# Patient Record
Sex: MALE | Race: WHITE | NOT HISPANIC OR LATINO | Employment: OTHER | URBAN - METROPOLITAN AREA
[De-identification: names, ages, dates, MRNs, and addresses within clinical notes are randomized per-mention and may not be internally consistent; named-entity substitution may affect disease eponyms.]

---

## 2022-05-12 DIAGNOSIS — R06.09 OTHER FORM OF DYSPNEA: Primary | ICD-10-CM

## 2022-05-19 ENCOUNTER — OFFICE VISIT (OUTPATIENT)
Dept: CARDIAC SURGERY | Facility: CLINIC | Age: 82
End: 2022-05-19
Payer: MEDICARE

## 2022-05-19 VITALS
DIASTOLIC BLOOD PRESSURE: 77 MMHG | HEART RATE: 91 BPM | WEIGHT: 212 LBS | BODY MASS INDEX: 28.71 KG/M2 | HEIGHT: 72 IN | SYSTOLIC BLOOD PRESSURE: 121 MMHG

## 2022-05-19 DIAGNOSIS — I35.0 AORTIC VALVE STENOSIS, ETIOLOGY OF CARDIAC VALVE DISEASE UNSPECIFIED: Primary | ICD-10-CM

## 2022-05-19 PROCEDURE — 3288F FALL RISK ASSESSMENT DOCD: CPT | Mod: CPTII,,, | Performed by: THORACIC SURGERY (CARDIOTHORACIC VASCULAR SURGERY)

## 2022-05-19 PROCEDURE — 1101F PT FALLS ASSESS-DOCD LE1/YR: CPT | Mod: CPTII,,, | Performed by: THORACIC SURGERY (CARDIOTHORACIC VASCULAR SURGERY)

## 2022-05-19 PROCEDURE — 1159F PR MEDICATION LIST DOCUMENTED IN MEDICAL RECORD: ICD-10-PCS | Mod: CPTII,,, | Performed by: THORACIC SURGERY (CARDIOTHORACIC VASCULAR SURGERY)

## 2022-05-19 PROCEDURE — 99203 OFFICE O/P NEW LOW 30 MIN: CPT | Mod: ,,, | Performed by: THORACIC SURGERY (CARDIOTHORACIC VASCULAR SURGERY)

## 2022-05-19 PROCEDURE — 3288F PR FALLS RISK ASSESSMENT DOCUMENTED: ICD-10-PCS | Mod: CPTII,,, | Performed by: THORACIC SURGERY (CARDIOTHORACIC VASCULAR SURGERY)

## 2022-05-19 PROCEDURE — 1159F MED LIST DOCD IN RCRD: CPT | Mod: CPTII,,, | Performed by: THORACIC SURGERY (CARDIOTHORACIC VASCULAR SURGERY)

## 2022-05-19 PROCEDURE — 3074F PR MOST RECENT SYSTOLIC BLOOD PRESSURE < 130 MM HG: ICD-10-PCS | Mod: CPTII,,, | Performed by: THORACIC SURGERY (CARDIOTHORACIC VASCULAR SURGERY)

## 2022-05-19 PROCEDURE — 3078F PR MOST RECENT DIASTOLIC BLOOD PRESSURE < 80 MM HG: ICD-10-PCS | Mod: CPTII,,, | Performed by: THORACIC SURGERY (CARDIOTHORACIC VASCULAR SURGERY)

## 2022-05-19 PROCEDURE — 1101F PR PT FALLS ASSESS DOC 0-1 FALLS W/OUT INJ PAST YR: ICD-10-PCS | Mod: CPTII,,, | Performed by: THORACIC SURGERY (CARDIOTHORACIC VASCULAR SURGERY)

## 2022-05-19 PROCEDURE — 99203 PR OFFICE/OUTPT VISIT, NEW, LEVL III, 30-44 MIN: ICD-10-PCS | Mod: ,,, | Performed by: THORACIC SURGERY (CARDIOTHORACIC VASCULAR SURGERY)

## 2022-05-19 PROCEDURE — 3078F DIAST BP <80 MM HG: CPT | Mod: CPTII,,, | Performed by: THORACIC SURGERY (CARDIOTHORACIC VASCULAR SURGERY)

## 2022-05-19 PROCEDURE — 3074F SYST BP LT 130 MM HG: CPT | Mod: CPTII,,, | Performed by: THORACIC SURGERY (CARDIOTHORACIC VASCULAR SURGERY)

## 2022-05-19 RX ORDER — ALLOPURINOL 100 MG/1
1 TABLET ORAL DAILY
COMMUNITY
Start: 2022-05-10

## 2022-05-19 RX ORDER — AMLODIPINE BESYLATE 10 MG/1
1 TABLET ORAL DAILY
COMMUNITY
Start: 2022-05-10 | End: 2022-06-07

## 2022-05-19 RX ORDER — CITALOPRAM 20 MG/1
1 TABLET, FILM COATED ORAL DAILY
COMMUNITY
Start: 2022-05-10

## 2022-05-19 RX ORDER — WARFARIN 2.5 MG/1
1 TABLET ORAL DAILY PRN
Status: ON HOLD | COMMUNITY
Start: 2022-05-10 | End: 2022-05-25 | Stop reason: HOSPADM

## 2022-05-19 RX ORDER — TAMSULOSIN HYDROCHLORIDE 0.4 MG/1
1 CAPSULE ORAL DAILY
COMMUNITY
Start: 2022-04-25 | End: 2022-06-06

## 2022-05-19 RX ORDER — BENAZEPRIL HYDROCHLORIDE 20 MG/1
1 TABLET ORAL DAILY
COMMUNITY
Start: 2022-05-10 | End: 2022-06-07

## 2022-05-19 RX ORDER — ATORVASTATIN CALCIUM 20 MG/1
20 TABLET, FILM COATED ORAL NIGHTLY
COMMUNITY
Start: 2022-04-07

## 2022-05-19 NOTE — PROGRESS NOTES
History and Physical      Subjective:      Patient ID: Geo Barrow is a 81 y.o. male.    Chief Complaint: Pre-op Exam (TAVR )      HPI:  This is an 81-year-old male former smoker who states that for the past several months he has had exertion related lightheadedness.  He will occasionally get short of breath but denies chest pain, orthopnea, or nocturnal dyspnea.  He has been diagnosed with critical aortic stenosis.  His STS risk assessment is 1.27%.  Ejection fraction is 55%.  The aortic valve area is 0.8 with a mean gradient of 42 and peak velocity of 4.6 m/sec.  He would be a very good candidate for transcatheter solution.  The procedure, risk, and complications have been discussed in detail and plain language.  Included in the discussion was the risk of bleeding, infection, myocardial infarction, stroke, and the potential for pacemaker implant and open conversion.  The patient voiced understanding and is willing to proceed.  We plan surgery on Tuesday May 24      Current Outpatient Medications:     allopurinoL (ZYLOPRIM) 100 MG tablet, Take 1 tablet by mouth once daily at 6am., Disp: , Rfl:     amLODIPine (NORVASC) 10 MG tablet, Take 1 tablet by mouth once daily at 6am., Disp: , Rfl:     atorvastatin (LIPITOR) 20 MG tablet, Take 1 tablet by mouth once daily at 6am., Disp: , Rfl:     benazepriL (LOTENSIN) 20 MG tablet, Take 1 tablet by mouth once daily at 6am., Disp: , Rfl:     citalopram (CELEXA) 10 MG tablet, Take 1 tablet by mouth once daily at 6am., Disp: , Rfl:     tamsulosin (FLOMAX) 0.4 mg Cap, Take 1 capsule by mouth once daily at 6am., Disp: , Rfl:     warfarin (COUMADIN) 2.5 MG tablet, Take 1 tablet by mouth daily as needed., Disp: , Rfl:   Review of patient's allergies indicates:  No Known Allergies    /77   Pulse 91   Ht 6' (1.829 m)   Wt 96.2 kg (212 lb)   BMI 28.75 kg/m²     Review of Systems   Constitutional: Negative.   HENT: Negative.    Eyes: Negative.     Cardiovascular:  Chronic atrial fibrillation  Respiratory: Negative.    Endocrine: Negative.    Hematologic/Lymphatic: Negative.    Skin: Negative.    Musculoskeletal: Negative.    Gastrointestinal: Negative.    Genitourinary: Negative.    Neurological: Negative.    Psychiatric/Behavioral: Negative.    Allergic/Immunologic: Negative.        Objective:     Constitutional:  No acute distress  HENT:  Supple without mass.  Trachea midline.  There is a transmitted aortic murmur  Eyes:  PERRLA  Cardiovascular:  Irregularly irregular.  There is a grade 2 systolic ejection murmur  Respiratory:  Clear to auscultation  Endocrine:  Hematologic/Lymphatic:   Skin:  Warm and dry  Musculoskeletal:  No gross abnormality  Gastrointestinal:  Soft and nontender  Genitourinary:   Neurological:  Normal  Psychiatric/Behavioral:   Extremities:  No cyanosis clubbing or edema    Assessment:  Critical aortic stenosis     Imaging:       Plan:  Transcatheter aortic valve replacement on Tuesday May 24

## 2022-05-23 ENCOUNTER — HOSPITAL ENCOUNTER (INPATIENT)
Facility: HOSPITAL | Age: 82
LOS: 2 days | Discharge: HOME | DRG: 811 | End: 2022-05-25
Attending: EMERGENCY MEDICINE | Admitting: INTERNAL MEDICINE
Payer: MEDICARE

## 2022-05-23 DIAGNOSIS — D64.9 SYMPTOMATIC ANEMIA: ICD-10-CM

## 2022-05-23 DIAGNOSIS — I35.0 AORTIC VALVE STENOSIS, ETIOLOGY OF CARDIAC VALVE DISEASE UNSPECIFIED: ICD-10-CM

## 2022-05-23 DIAGNOSIS — K92.2 ACUTE GI BLEEDING: ICD-10-CM

## 2022-05-23 DIAGNOSIS — R79.89 TROPONIN LEVEL ELEVATED: ICD-10-CM

## 2022-05-23 DIAGNOSIS — I35.0 NODULAR CALCIFIC AORTIC VALVE STENOSIS: ICD-10-CM

## 2022-05-23 DIAGNOSIS — R55 SYNCOPE AND COLLAPSE: ICD-10-CM

## 2022-05-23 DIAGNOSIS — R55 SYNCOPE, UNSPECIFIED SYNCOPE TYPE: Primary | ICD-10-CM

## 2022-05-23 DIAGNOSIS — R40.20 LOSS OF CONSCIOUSNESS: ICD-10-CM

## 2022-05-23 LAB
ABO + RH BLD: NORMAL
ABO + RH BLD: NORMAL
ALBUMIN SERPL-MCNC: 3.3 GM/DL (ref 3.4–4.8)
ALBUMIN/GLOB SERPL: 1.7 RATIO (ref 1.1–2)
ALP SERPL-CCNC: 62 UNIT/L (ref 40–150)
ALT SERPL-CCNC: 41 UNIT/L (ref 0–55)
AST SERPL-CCNC: 35 UNIT/L (ref 5–34)
AV INDEX (PROSTH): 0.14
AV MEAN GRADIENT: 62 MMHG
AV PEAK GRADIENT: 98 MMHG
AV VALVE AREA: 0.45 CM2
AV VELOCITY RATIO: 0.16
BASOPHILS # BLD AUTO: 0.03 X10(3)/MCL (ref 0–0.2)
BASOPHILS NFR BLD AUTO: 0.5 %
BILIRUBIN DIRECT+TOT PNL SERPL-MCNC: 0.5 MG/DL
BLD PROD TYP BPU: NORMAL
BLD PROD TYP BPU: NORMAL
BLOOD UNIT EXPIRATION DATE: NORMAL
BLOOD UNIT EXPIRATION DATE: NORMAL
BLOOD UNIT TYPE CODE: 600
BLOOD UNIT TYPE CODE: 600
BNP BLD-MCNC: 679 PG/ML
BSA FOR ECHO PROCEDURE: 2.21 M2
BUN SERPL-MCNC: 34.4 MG/DL (ref 8.4–25.7)
CALCIUM SERPL-MCNC: 8.7 MG/DL (ref 8.8–10)
CHLORIDE SERPL-SCNC: 106 MMOL/L (ref 98–107)
CHOLEST SERPL-MCNC: 99 MG/DL
CHOLEST/HDLC SERPL: 4 {RATIO} (ref 0–5)
CO2 SERPL-SCNC: 26 MMOL/L (ref 23–31)
CREAT SERPL-MCNC: 1.2 MG/DL (ref 0.73–1.18)
CROSSMATCH INTERPRETATION: NORMAL
CROSSMATCH INTERPRETATION: NORMAL
CV ECHO LV RWT: 0.86 CM
DISPENSE STATUS: NORMAL
DISPENSE STATUS: NORMAL
DOP CALC AO PEAK VEL: 4.94 M/S
DOP CALC AO VTI: 117 CM
DOP CALC LVOT AREA: 3.1 CM2
DOP CALC LVOT DIAMETER: 2 CM
DOP CALC LVOT PEAK VEL: 0.77 M/S
DOP CALC LVOT STROKE VOLUME: 52.44 CM3
DOP CALC MV VTI: 28.5 CM
DOP CALCLVOT PEAK VEL VTI: 16.7 CM
E/E' RATIO: 14.64 M/S
ECHO LV POSTERIOR WALL: 1.79 CM (ref 0.6–1.1)
EJECTION FRACTION: 50 %
EOSINOPHIL # BLD AUTO: 0.21 X10(3)/MCL (ref 0–0.9)
EOSINOPHIL NFR BLD AUTO: 3.4 %
ERYTHROCYTE [DISTWIDTH] IN BLOOD BY AUTOMATED COUNT: 13.6 % (ref 11.5–17)
FRACTIONAL SHORTENING: 25 % (ref 28–44)
GLOBULIN SER-MCNC: 1.9 GM/DL (ref 2.4–3.5)
GLUCOSE SERPL-MCNC: 139 MG/DL (ref 82–115)
GROUP & RH: NORMAL
HCT VFR BLD AUTO: 23.7 % (ref 42–52)
HCT VFR BLD AUTO: 26.8 % (ref 42–52)
HDLC SERPL-MCNC: 24 MG/DL (ref 35–60)
HGB BLD-MCNC: 7.7 GM/DL (ref 14–18)
HGB BLD-MCNC: 9 GM/DL (ref 14–18)
IMM GRANULOCYTES # BLD AUTO: 0.07 X10(3)/MCL (ref 0–0.02)
IMM GRANULOCYTES NFR BLD AUTO: 1.1 % (ref 0–0.43)
INDIRECT COOMBS GEL: NORMAL
INR BLD: 1.92 (ref 0–1.3)
INTERVENTRICULAR SEPTUM: 1.72 CM (ref 0.6–1.1)
LDLC SERPL CALC-MCNC: 58 MG/DL (ref 50–140)
LEFT ATRIUM SIZE: 4.7 CM
LEFT ATRIUM VOLUME INDEX MOD: 77.1 ML/M2
LEFT ATRIUM VOLUME MOD: 168 CM3
LEFT CCA DIST DIAS: 15 CM/S
LEFT CCA DIST SYS: 48 CM/S
LEFT CCA PROX DIAS: 17 CM/S
LEFT CCA PROX SYS: 87 CM/S
LEFT ECA SYS: 49 CM/S
LEFT ICA DIST DIAS: 17 CM/S
LEFT ICA DIST SYS: 51 CM/S
LEFT ICA MID DIAS: 24 CM/S
LEFT ICA MID SYS: 87 CM/S
LEFT ICA PROX DIAS: 25 CM/S
LEFT ICA PROX SYS: 89 CM/S
LEFT INTERNAL DIMENSION IN SYSTOLE: 3.13 CM (ref 2.1–4)
LEFT VENTRICLE DIASTOLIC VOLUME INDEX: 35.64 ML/M2
LEFT VENTRICLE DIASTOLIC VOLUME: 77.7 ML
LEFT VENTRICLE MASS INDEX: 146 G/M2
LEFT VENTRICLE SYSTOLIC VOLUME INDEX: 17.8 ML/M2
LEFT VENTRICLE SYSTOLIC VOLUME: 38.8 ML
LEFT VENTRICULAR INTERNAL DIMENSION IN DIASTOLE: 4.18 CM (ref 3.5–6)
LEFT VENTRICULAR MASS: 318.19 G
LEFT VERTEBRAL SYS: 41 CM/S
LV LATERAL E/E' RATIO: 14.64 M/S
LV SEPTAL E/E' RATIO: 14.64 M/S
LVOT MG: 1 MMHG
LVOT MV: 0.54 CM/S
LYMPHOCYTES # BLD AUTO: 0.49 X10(3)/MCL (ref 0.6–4.6)
LYMPHOCYTES NFR BLD AUTO: 7.9 %
MAGNESIUM SERPL-MCNC: 2 MG/DL (ref 1.6–2.6)
MCH RBC QN AUTO: 32 PG (ref 27–31)
MCHC RBC AUTO-ENTMCNC: 32.5 MG/DL (ref 33–36)
MCV RBC AUTO: 98.3 FL (ref 80–94)
MONOCYTES # BLD AUTO: 0.33 X10(3)/MCL (ref 0.1–1.3)
MONOCYTES NFR BLD AUTO: 5.3 %
MR PISA EROA: 0.13 CM2
MV MEAN GRADIENT: 3 MMHG
MV PEAK E VEL: 1.61 M/S
MV PEAK GRADIENT: 8 MMHG
MV STENOSIS PRESSURE HALF TIME: 51 MS
MV VALVE AREA BY CONTINUITY EQUATION: 1.84 CM2
MV VALVE AREA P 1/2 METHOD: 4.31 CM2
NEUTROPHILS # BLD AUTO: 5.1 X10(3)/MCL (ref 2.1–9.2)
NEUTROPHILS NFR BLD AUTO: 81.8 %
NRBC BLD AUTO-RTO: 0 %
OHS CV CAROTID RIGHT ICA EDV HIGHEST: 25
OHS CV CAROTID ULTRASOUND LEFT ICA/CCA RATIO: 1.85
OHS CV CAROTID ULTRASOUND RIGHT ICA/CCA RATIO: 1.46
OHS CV PV CAROTID LEFT HIGHEST CCA: 87
OHS CV PV CAROTID LEFT HIGHEST ICA: 89
OHS CV PV CAROTID RIGHT HIGHEST CCA: 83
OHS CV PV CAROTID RIGHT HIGHEST ICA: 73
OHS CV US CAROTID LEFT HIGHEST EDV: 25
PISA MRMAX VEL: 6.03 M/S
PISA RADIUS: 0.5 CM
PISA TR MAX VEL: 2.4 M/S
PISA VN NYQUIST MS: 0.5 M/S
PISA VN NYQUIST: 0.5 M/S
PLATELET # BLD AUTO: 148 X10(3)/MCL (ref 130–400)
PMV BLD AUTO: 11.1 FL (ref 9.4–12.4)
POTASSIUM SERPL-SCNC: 3.5 MMOL/L (ref 3.5–5.1)
PROT SERPL-MCNC: 5.2 GM/DL (ref 5.8–7.6)
PROTHROMBIN TIME: 21.6 SECONDS (ref 12.5–14.5)
PV PEAK VELOCITY: 1.18 CM/S
RA PRESSURE: 15 MMHG
RBC # BLD AUTO: 2.41 X10(6)/MCL (ref 4.7–6.1)
RIGHT CCA DIST DIAS: 13 CM/S
RIGHT CCA DIST SYS: 50 CM/S
RIGHT CCA PROX DIAS: 7 CM/S
RIGHT CCA PROX SYS: 83 CM/S
RIGHT ECA DIAS: 47 CM/S
RIGHT ECA SYS: 55 CM/S
RIGHT ICA DIST DIAS: 25 CM/S
RIGHT ICA DIST SYS: 53 CM/S
RIGHT ICA MID DIAS: 8 CM/S
RIGHT ICA MID SYS: 73 CM/S
RIGHT ICA PROX DIAS: 11 CM/S
RIGHT ICA PROX SYS: 55 CM/S
RIGHT VENTRICULAR END-DIASTOLIC DIMENSION: 2.99 CM
RIGHT VERTEBRAL SYS: 80 CM/S
SODIUM SERPL-SCNC: 140 MMOL/L (ref 136–145)
TDI LATERAL: 0.11 M/S
TDI SEPTAL: 0.11 M/S
TDI: 0.11 M/S
TR MAX PG: 23 MMHG
TRICUSPID ANNULAR PLANE SYSTOLIC EXCURSION: 2.14 CM
TRIGL SERPL-MCNC: 83 MG/DL (ref 34–140)
TROPONIN I SERPL-MCNC: 0.05 NG/ML (ref 0–0.04)
TROPONIN I SERPL-MCNC: 0.15 NG/ML (ref 0–0.04)
TV REST PULMONARY ARTERY PRESSURE: 38 MMHG
UNIT NUMBER: NORMAL
UNIT NUMBER: NORMAL
VLDLC SERPL CALC-MCNC: 17 MG/DL
WBC # SPEC AUTO: 6.2 X10(3)/MCL (ref 4.5–11.5)

## 2022-05-23 PROCEDURE — 96374 THER/PROPH/DIAG INJ IV PUSH: CPT

## 2022-05-23 PROCEDURE — 11000001 HC ACUTE MED/SURG PRIVATE ROOM

## 2022-05-23 PROCEDURE — 99285 EMERGENCY DEPT VISIT HI MDM: CPT | Mod: 25

## 2022-05-23 PROCEDURE — C9113 INJ PANTOPRAZOLE SODIUM, VIA: HCPCS | Performed by: EMERGENCY MEDICINE

## 2022-05-23 PROCEDURE — 36430 TRANSFUSION BLD/BLD COMPNT: CPT

## 2022-05-23 PROCEDURE — 83880 ASSAY OF NATRIURETIC PEPTIDE: CPT | Performed by: EMERGENCY MEDICINE

## 2022-05-23 PROCEDURE — 84484 ASSAY OF TROPONIN QUANT: CPT | Performed by: EMERGENCY MEDICINE

## 2022-05-23 PROCEDURE — 96361 HYDRATE IV INFUSION ADD-ON: CPT

## 2022-05-23 PROCEDURE — C9113 INJ PANTOPRAZOLE SODIUM, VIA: HCPCS | Performed by: PHYSICIAN ASSISTANT

## 2022-05-23 PROCEDURE — 85610 PROTHROMBIN TIME: CPT | Performed by: EMERGENCY MEDICINE

## 2022-05-23 PROCEDURE — 80053 COMPREHEN METABOLIC PANEL: CPT | Performed by: EMERGENCY MEDICINE

## 2022-05-23 PROCEDURE — 85025 COMPLETE CBC W/AUTO DIFF WBC: CPT | Performed by: EMERGENCY MEDICINE

## 2022-05-23 PROCEDURE — 25000003 PHARM REV CODE 250: Performed by: EMERGENCY MEDICINE

## 2022-05-23 PROCEDURE — 36415 COLL VENOUS BLD VENIPUNCTURE: CPT | Performed by: EMERGENCY MEDICINE

## 2022-05-23 PROCEDURE — 86850 RBC ANTIBODY SCREEN: CPT | Performed by: EMERGENCY MEDICINE

## 2022-05-23 PROCEDURE — 63600175 PHARM REV CODE 636 W HCPCS: Performed by: EMERGENCY MEDICINE

## 2022-05-23 PROCEDURE — 63600175 PHARM REV CODE 636 W HCPCS: Performed by: PHYSICIAN ASSISTANT

## 2022-05-23 PROCEDURE — P9016 RBC LEUKOCYTES REDUCED: HCPCS | Performed by: EMERGENCY MEDICINE

## 2022-05-23 PROCEDURE — 86920 COMPATIBILITY TEST SPIN: CPT | Performed by: EMERGENCY MEDICINE

## 2022-05-23 PROCEDURE — 80061 LIPID PANEL: CPT | Performed by: PHYSICIAN ASSISTANT

## 2022-05-23 PROCEDURE — 83735 ASSAY OF MAGNESIUM: CPT | Performed by: EMERGENCY MEDICINE

## 2022-05-23 PROCEDURE — 85014 HEMATOCRIT: CPT | Performed by: PHYSICIAN ASSISTANT

## 2022-05-23 RX ORDER — CITALOPRAM 10 MG/1
10 TABLET ORAL DAILY
Status: DISCONTINUED | OUTPATIENT
Start: 2022-05-24 | End: 2022-05-25 | Stop reason: HOSPADM

## 2022-05-23 RX ORDER — ALLOPURINOL 100 MG/1
100 TABLET ORAL DAILY
Status: DISCONTINUED | OUTPATIENT
Start: 2022-05-24 | End: 2022-05-25 | Stop reason: HOSPADM

## 2022-05-23 RX ORDER — TAMSULOSIN HYDROCHLORIDE 0.4 MG/1
1 CAPSULE ORAL DAILY
Status: DISCONTINUED | OUTPATIENT
Start: 2022-05-24 | End: 2022-05-25 | Stop reason: HOSPADM

## 2022-05-23 RX ORDER — PANTOPRAZOLE SODIUM 40 MG/10ML
40 INJECTION, POWDER, LYOPHILIZED, FOR SOLUTION INTRAVENOUS 2 TIMES DAILY
Status: DISCONTINUED | OUTPATIENT
Start: 2022-05-23 | End: 2022-05-25

## 2022-05-23 RX ORDER — SODIUM CHLORIDE 0.9 % (FLUSH) 0.9 %
10 SYRINGE (ML) INJECTION EVERY 12 HOURS PRN
Status: DISCONTINUED | OUTPATIENT
Start: 2022-05-23 | End: 2022-05-25 | Stop reason: HOSPADM

## 2022-05-23 RX ORDER — ACETAMINOPHEN 325 MG/1
650 TABLET ORAL EVERY 8 HOURS PRN
Status: DISCONTINUED | OUTPATIENT
Start: 2022-05-23 | End: 2022-05-25 | Stop reason: HOSPADM

## 2022-05-23 RX ORDER — FUROSEMIDE 20 MG/1
20 TABLET ORAL DAILY
COMMUNITY
End: 2022-06-07

## 2022-05-23 RX ORDER — PANTOPRAZOLE SODIUM 40 MG/10ML
80 INJECTION, POWDER, LYOPHILIZED, FOR SOLUTION INTRAVENOUS
Status: COMPLETED | OUTPATIENT
Start: 2022-05-23 | End: 2022-05-23

## 2022-05-23 RX ORDER — IBUPROFEN 200 MG
24 TABLET ORAL
Status: DISCONTINUED | OUTPATIENT
Start: 2022-05-23 | End: 2022-05-25 | Stop reason: HOSPADM

## 2022-05-23 RX ORDER — GLUCAGON 1 MG
1 KIT INJECTION
Status: DISCONTINUED | OUTPATIENT
Start: 2022-05-23 | End: 2022-05-25 | Stop reason: HOSPADM

## 2022-05-23 RX ORDER — ATORVASTATIN CALCIUM 10 MG/1
20 TABLET, FILM COATED ORAL DAILY
Status: DISCONTINUED | OUTPATIENT
Start: 2022-05-24 | End: 2022-05-25 | Stop reason: HOSPADM

## 2022-05-23 RX ORDER — HYDROCODONE BITARTRATE AND ACETAMINOPHEN 500; 5 MG/1; MG/1
TABLET ORAL
Status: DISCONTINUED | OUTPATIENT
Start: 2022-05-23 | End: 2022-05-25 | Stop reason: HOSPADM

## 2022-05-23 RX ORDER — IBUPROFEN 200 MG
16 TABLET ORAL
Status: DISCONTINUED | OUTPATIENT
Start: 2022-05-23 | End: 2022-05-25 | Stop reason: HOSPADM

## 2022-05-23 RX ORDER — ACETAMINOPHEN 325 MG/1
650 TABLET ORAL EVERY 4 HOURS PRN
Status: DISCONTINUED | OUTPATIENT
Start: 2022-05-23 | End: 2022-05-25 | Stop reason: HOSPADM

## 2022-05-23 RX ORDER — ONDANSETRON 2 MG/ML
4 INJECTION INTRAMUSCULAR; INTRAVENOUS EVERY 4 HOURS PRN
Status: DISCONTINUED | OUTPATIENT
Start: 2022-05-23 | End: 2022-05-25 | Stop reason: HOSPADM

## 2022-05-23 RX ADMIN — PANTOPRAZOLE SODIUM 80 MG: 40 INJECTION, POWDER, FOR SOLUTION INTRAVENOUS at 10:05

## 2022-05-23 RX ADMIN — SODIUM CHLORIDE 500 ML: 9 INJECTION, SOLUTION INTRAVENOUS at 10:05

## 2022-05-23 RX ADMIN — PANTOPRAZOLE SODIUM 40 MG: 40 INJECTION, POWDER, FOR SOLUTION INTRAVENOUS at 09:05

## 2022-05-23 NOTE — H&P
[facility] Hospital Medicine History & Physical Examination       Patient Name: Geo Barrow  MRN: [id number]  Patient Class: IP- Inpatient   Admission Date: 5/23/2022   Admitting Physician: Shania Benoit MD   Length of Stay: 0  Attending Physician: Shania Benoit MD   Primary Care Provider: Deidra Briggs NP  Face-to-Face encounter date: 05/23/2022  Code Status: Full Code  Chief Complaint: Loss of Consciousness (Syncopal episode this morning while sitting in bath tub. Scheduled to have valve replacement tomorrow. Wife states pt had syncopal episode yesterday morning as well. Cardiologist - sunday. Denies cp/sob, n/v. )        Patient information was obtained from patient, patient's family, past medical records and ER records.     HISTORY OF PRESENT ILLNESS:   Geo Barrow is a 81 y.o. White male with a past medical history of hypertension, hyperlipidemia, atrial fibrillation on Coumadin, gout, prostate cancer status post radiation and aortic valve stenosis. The patient presented to [facility] on 5/23/2022 with a primary complaint of with complaints of 2 syncopal episodes yesterday (5/22/2022).  First episode patient was walking to his vehicle when he became short of breath and dizzy. He was sitting in his truck he passed out. Secondary incident occurred this morning while in the bath tube. Associated symptoms include weakness with ambulation and blurred vision. He denies complaints of chest pain, abdominal pain, headache. He reports having the stomach virus a week ago with nausea, vomiting and diarrhea but has since has resolved. He started to notice dark black stool on 5/18/2022. He is on Coumadin which he took his last dose on 5/17/2022 as he is scheduled for a TAVR tomorrow (5/24/2022) with Dr. Fragoso. He reports taking 1 Aleve over the last few days. His last colonoscopy was 5 years ago which was normal.    Upon presentation to the ED, afebrile, tachycardic at 110 and hypotensive at  90/48. Labs notable for hemoglobin and hematocrit 7.7/23.7, MCV 98, BUN/creatinine 34/1.20, glucose 139, troponin 0.146, , INR 1.92.  EKG with atrial fibrillation with a rate of 94, nonspecific ST and T-wave abnormalities and prolonged QT interval.  Chest x-ray without acute cardiopulmonary processes. Stool brown with some black and guaiac positive. While in the ED patient received 80 mg of Protonix. He was typed and crossmatched for transfusion of 2 units packed red blood cells. Cardiology and GI were consulted. Patient is admitted to hospital medicine services for further medical management.     PAST MEDICAL HISTORY:     Past Medical History:   Diagnosis Date    Aortic valve stenosis     Atrial fibrillation     Hyperlipidemia     Hypertension     Stenosis of aortic and mitral valves    BPH  Prostate cancer s/p radiation      PAST SURGICAL HISTORY:   Hernia repair  Colonoscopy    ALLERGIES:   Beta blockers    FAMILY HISTORY:   Reviewed and negative    SOCIAL HISTORY:   Tobacco - Former smoker. Quit 60+ years ago  Alcohol - 4-5 beers daily  Illicit Drugs - Denies      HOME MEDICATIONS:     Prior to Admission medications    Medication Sig Start Date End Date Taking? Authorizing Provider   allopurinoL (ZYLOPRIM) 100 MG tablet Take 1 tablet by mouth once daily at 6am. 5/10/22  Yes Historical Provider   amLODIPine (NORVASC) 10 MG tablet Take 1 tablet by mouth once daily at 6am. 5/10/22  Yes Historical Provider   atorvastatin (LIPITOR) 20 MG tablet Take 1 tablet by mouth once daily at 6am. 4/7/22  Yes Historical Provider   benazepriL (LOTENSIN) 20 MG tablet Take 1 tablet by mouth once daily at 6am. 5/10/22  Yes Historical Provider   citalopram (CELEXA) 10 MG tablet Take 1 tablet by mouth once daily at 6am. 5/10/22  Yes Historical Provider   furosemide (LASIX) 20 MG tablet Take 20 mg by mouth once daily. 3 times a week   Yes Historical Provider   warfarin (COUMADIN) 2.5 MG tablet Take 1 tablet by mouth daily  as needed. 5/10/22  Yes Historical Provider   tamsulosin (FLOMAX) 0.4 mg Cap Take 1 capsule by mouth once daily at 6am. 4/25/22   Historical Provider       REVIEW OF SYSTEMS:   Except as documented, all other systems reviewed and negative     PHYSICAL EXAM:     VITAL SIGNS: 24 HRS MIN & MAX LAST   Temp  Min: 97.2 °F (36.2 °C)  Max: 98.4 °F (36.9 °C) 98.4 °F (36.9 °C)   BP  Min: 90/48  Max: 112/70 112/70   Pulse  Min: 82  Max: 110  95   Resp  Min: 11  Max: 16 16   SpO2  Min: 96 %  Max: 100 % 99 %       General appearance: Well-developed, well-nourished male in no apparent distress. No family at bedside.  HEENT: Atraumatic head. Moist mucous membranes of oral cavity.  Lungs: Clear to auscultation bilaterally.   Heart: Irregular rate and rhythm. No edema  Abdomen: Soft, mild distention, non-tender. Bowel sounds are hypoactive.   Extremities: No cyanosis, clubbing. No deformities.  Skin: No Rash. Warm and dry.  Neuro: Awake, alert and oriented. Motor and sensory exams grossly intact.  Psych/mental status: Appropriate mood and affect. Cooperative. Responds appropriately to questions.       LABS AND IMAGING:     Recent Labs   Lab 05/23/22  0955   WBC 6.2   RBC 2.41*   HGB 7.7*   HCT 23.7*   MCV 98.3*   MCH 32.0*   MCHC 32.5*   RDW 13.6      MPV 11.1       Recent Labs   Lab 05/23/22  0955      K 3.5   CO2 26   BUN 34.4*   CREATININE 1.20*   CALCIUM 8.7*   MG 2.00   ALBUMIN 3.3*   ALKPHOS 62   ALT 41   AST 35*   BILITOT 0.5       Microbiology Results (last 7 days)     ** No results found for the last 168 hours. **           X-Ray Chest AP Portable  Narrative: EXAMINATION:  XR CHEST AP PORTABLE    CLINICAL HISTORY:  syncope;    TECHNIQUE:  Single view of the chest    COMPARISON:  No prior imaging available for comparison.    FINDINGS:  No focal opacification, pleural effusion, or pneumothorax.    The cardiomediastinal silhouette is within normal limits.    No acute osseous abnormality.  Impression: No acute  cardiopulmonary process.    Electronically signed by: Sandeep Palencia  Date:    05/23/2022  Time:    09:55        ASSESSMENT & PLAN:   Assessment:  Syncope  Acute macrocytic anemia secondary to acute GI Bleed  NSTEMI  Atrial fibrillation  Essential hypertension  Aortic valve stenosis    Plan:  - GI and cardiology consulted. Appreciate recommendations  - Clear liquid diet. NPO after midnight  - IV Protonix 40mg BID  - Type and crossmatch completed for transfusion of 2 units packed RBC  - Repeat H&H 1 hour post transfusion  - Trend troponin  - Telemetry monitoring  - Orthostatic vitals, echo and Carotid US ordered  - Labs in AM        VTE Prophylaxis: will be placed on SCD for DVT prophylaxis and will be advised to be as mobile as possible and sit in a chair as tolerated      __________________________________________________________________________  INPATIENT LIST OF MEDICATIONS     Current Facility-Administered Medications:     0.9%  NaCl infusion (for blood administration), , Intravenous, Q24H PRN, Baldomero Balbuena MD    acetaminophen tablet 650 mg, 650 mg, Oral, Q8H PRN, Bobbi Rooney PA-C    acetaminophen tablet 650 mg, 650 mg, Oral, Q4H PRN, Bobbi Rooney PA-C    [START ON 5/24/2022] allopurinoL tablet 100 mg, 100 mg, Oral, Daily, Shania Benoit MD    [START ON 5/24/2022] atorvastatin tablet 20 mg, 20 mg, Oral, Daily, Shania Benoit MD    [START ON 5/24/2022] citalopram tablet 10 mg, 10 mg, Oral, Daily, Shania Benoit MD    dextrose 10% bolus 125 mL, 12.5 g, Intravenous, PRN, Baldomero Balbuena MD    dextrose 10% bolus 250 mL, 25 g, Intravenous, PRN, Baldomero Balbuena MD    glucagon (human recombinant) injection 1 mg, 1 mg, Intramuscular, PRN, Bobbi Rooney PA-C    glucose chewable tablet 16 g, 16 g, Oral, PRN, Bobbi Rooney PA-C    glucose chewable tablet 24 g, 24 g, Oral, PRN, Bobbi Rooney PA-C    ondansetron injection 4 mg, 4 mg, Intravenous, Q4H PRN, Bobbi Rooney PA-C     pantoprazole injection 40 mg, 40 mg, Intravenous, BID, Bobbi Rooney PA-C    sodium chloride 0.9% flush 10 mL, 10 mL, Intravenous, Q12H PRN, Bobbi Rooney PA-C    [START ON 5/24/2022] tamsulosin 24 hr capsule 0.4 mg, 1 capsule, Oral, Daily, Shania Benoit MD    Current Outpatient Medications:     allopurinoL (ZYLOPRIM) 100 MG tablet, Take 1 tablet by mouth once daily at 6am., Disp: , Rfl:     amLODIPine (NORVASC) 10 MG tablet, Take 1 tablet by mouth once daily at 6am., Disp: , Rfl:     atorvastatin (LIPITOR) 20 MG tablet, Take 1 tablet by mouth once daily at 6am., Disp: , Rfl:     benazepriL (LOTENSIN) 20 MG tablet, Take 1 tablet by mouth once daily at 6am., Disp: , Rfl:     citalopram (CELEXA) 10 MG tablet, Take 1 tablet by mouth once daily at 6am., Disp: , Rfl:     furosemide (LASIX) 20 MG tablet, Take 20 mg by mouth once daily. 3 times a week, Disp: , Rfl:     warfarin (COUMADIN) 2.5 MG tablet, Take 1 tablet by mouth daily as needed., Disp: , Rfl:     tamsulosin (FLOMAX) 0.4 mg Cap, Take 1 capsule by mouth once daily at 6am., Disp: , Rfl:       Scheduled Meds:   [START ON 5/24/2022] allopurinoL  100 mg Oral Daily    [START ON 5/24/2022] atorvastatin  20 mg Oral Daily    [START ON 5/24/2022] citalopram  10 mg Oral Daily    pantoprazole  40 mg Intravenous BID    [START ON 5/24/2022] tamsulosin  1 capsule Oral Daily     Continuous Infusions:  PRN Meds:.sodium chloride, acetaminophen, acetaminophen, dextrose 10%, dextrose 10%, glucagon (human recombinant), glucose, glucose, ondansetron, sodium chloride 0.9%      Discharge Planning and Disposition: Anticipated discharge to be determined.    Bobbi DUGAN PA, have reviewed and discussed the case with Dr. Shania Benoit MD    Please see the following addendum for further assessment and plan from there attending MD.    Bobbi E Rooney, PA-C  05/23/2022    ________________________________________________________________________________    MD  Addendum:  I, Dr. Shania Benoit MD assumed care of this patient today at around 2 pm  For the patient encounter, I performed the substantive portion of the visit, I reviewed the NP/PA documentation, treatment plan, and medical decision making.  I had face to face time with this patient     A. History:  81-year-old  male with significant history of HTN, HLD, PAF on Coumadin, chronic gout, prostate cancer status post radiation, valvular heart disease-aortic stenosis awaiting TAVR.  Patient is scheduled for TAVR tomorrow morning.  Patient presented to the ED on 05/23 with complaints of 2 syncopal episodes yesterday.  Patient reports becoming short of breath and dizzy while he was walking to his vehicle yesterday and he passed out while he was inside his truck.  And 1 more episode today occurred while he was in backed up.  Patient reports he had viral gastroenteritis 1 and half weeks ago with primary complaints of nausea, vomiting, diarrhea.  Symptoms then had resolved, but since this he had couple of episodes of melanotic stools.  Patient's Coumadin has been on hold since 5/17 in preparation for TAVR.  Patient presented to the ED given syncopal episodes.  Upon initial evaluation in the ED patient was borderline hypotensive.  Lab significant for anemia with hemoglobin less than 7.7.  Patient was also noted to have mild renal insufficiency.  Hospitalist team and GI team consulted by the ED physician.      B. Physical exam: as above    C. Medical decision making:    Syncopal episode likely secondary to severe anemia  Acute versus acute on chronic macrocytic anemia  Recent melanotic stools  Suspected acute blood loss anemia secondary to  GI bleed  Severe aortic stenosis awaiting TAVR, scheduled for 5/24  History of essential HTN  Borderline hypotension on admit secondary to anemia  HLD  History of PAF on Coumadin, on hold since 05/17  Chronic gout  History of prostate cancer status post  radiation  Prophylaxis    Likely cause of syncopal episode was severe anemia  Patient also was hypotensive on admit, improved with blood transfusion  2 units PRBC transfusion ordered  Initiated IV Protonix  GI consulted, they are planning for endoscopic evaluation today afternoon  Will keep NPO  Cardiology also consulted given that he is scheduled for TAVR tomorrow  Await her recommendations  Continue to monitor hemodynamics closely  Coumadin already on hold since 05/17 in preparation for TAVR  Monitor for overt bleeding  Hold home antihypertensives given hypotension on admit  Resumed other home meds-statin, citalopram, tamsulosin, allopurinol  I will also obtain ultrasound carotid to further evaluate syncopal episode even though the most likely cause was anemia  DVT prophylaxis-bilateral SCDs    Critical care time-45 minutes  Critical care diagnosis-acute on chronic macrocytic  anemia requiring PRBC transfusion  Critical care interventions: Hands-on evaluation, review of labs/radiographs/records and discussions with patient    All diagnosis and differential diagnosis have been reviewed; assessment and plan has been documented; I have personally reviewed the labs and test results that are presently available; I have reviewed the patients medication list; I have reviewed the consulting providers response and recommendations. I have reviewed or attempted to review medical records based upon their availability.    All of the patient and family questions have been addressed and answered. Patient's is agreeable to the above stated plan. I will continue to monitor closely and make adjustments to medical management as needed.      Shania Benoit MD  05/23/2022

## 2022-05-23 NOTE — ASSESSMENT & PLAN NOTE
EGD today  NPO  H/H low- receiving 2 units PRBC in ER- starting now  INR 1.92    TAVR planned for tomorrow    Reports or black stools intermittently last week in conjunction with Pepto-Bismol use during a viral diarrheal illness that has resolved

## 2022-05-23 NOTE — ED NOTES
Bed: 19  Expected date: 5/23/22  Expected time: 8:55 AM  Means of arrival:   Comments:  Ems - 81 m - syncope

## 2022-05-23 NOTE — CONSULTS
"[facility] - Emergency Dept  Gastroenterology  Consult Note    Patient Name: Geo Barrow  MRN: [id number]  Admission Date: 5/23/2022  Hospital Length of Stay: 0 days  Code Status: Full Code   Attending Provider: Shania Benoit MD   Consulting Provider: MARTA Simms  Primary Care Physician: Reese Fragoso MD  Principal Problem:<principal problem not specified>    Inpatient consult to Gastroenterology  Consult performed by: MARTA Simms  Consult ordered by: Baldomero Balbuena MD  Reason for consult: anemia        Subjective:     HPI:  81 year old WM presented to ED with syncopal episodes- one  yesterday and and one today with weakness today. Pertinent PMHx includes: Aortic valve stenosis, Mitral Valve stenosis, HTN, A fib, HLD. Plans for TAVR tomorrow with [place]. Anemia noted upon arrival at 7.7/23.7.    Some black stools noted last week.   Guaiac positive in ER    Patient reports having a diarrheal illness last week that has resolved. Last bowel movement 2 days ago- brown. Reports several other sick contacts in family with same symptoms. Patient reports black stools intermittently with the diarrhea. This was also concurrent with Pepto-bismol use. Denies BRBPR      ETOH: daily beer: 2-6 beers per day for many years  NSAID: denies  Anticoagulant/antiplatelet: Coumadin with last dose 5/18/22 in preparation for TAVR on 5/24/22      Previous Endoscopy includes previous colonoscopies- routine with Dr. Guzman in [place]. He is scheduled for colonoscopy "this summer" for routine colonoscopy. Denies history of colon polyps.     No previous EGD  Denies heartburn, nausea, vomiting, dysphagia, epigastric pains    Denies family history of GI cancers or concerns      Past Medical History:   Diagnosis Date    Aortic valve stenosis     Atrial fibrillation     Hyperlipidemia     Hypertension     Stenosis of aortic and mitral valves        No past surgical history on file.    Review of patient's " allergies indicates:  No Known Allergies  Family History       Problem Relation (Age of Onset)    Hypertension Mother    No Known Problems Father, Sister, Brother, Maternal Aunt, Maternal Uncle, Paternal Aunt, Paternal Uncle, Maternal Grandmother, Maternal Grandfather, Paternal Grandmother, Paternal Grandfather          Tobacco Use    Smoking status: Former Smoker    Smokeless tobacco: Never Used   Substance and Sexual Activity    Alcohol use: Not on file    Drug use: Not on file    Sexual activity: Not on file     Review of Systems   Constitutional:  Negative for chills, fever and unexpected weight change.   HENT:  Negative for mouth sores, sore throat and trouble swallowing.    Eyes:  Negative for discharge and visual disturbance.   Respiratory:  Negative for apnea, cough, shortness of breath and wheezing.    Cardiovascular:  Negative for chest pain, palpitations and leg swelling.   Gastrointestinal:  Negative for abdominal distention, abdominal pain, blood in stool, nausea and vomiting.        Denies heartburn, nausea, vomiting, dysphagia, globus sensation, hematemesis, epigastric pain.   Denies Abdominal pain, tenderness  Denies constipation, BRBPR  Reports recent bout of diarrheal illness last week with last stool 2 days ago. Reports sick contacts in family as well.    Skin:  Negative for color change.   Neurological:  Positive for weakness. Negative for dizziness, syncope and headaches.   Psychiatric/Behavioral:  Negative for agitation, confusion and hallucinations.         Forgetful of past details   Objective:     Vital Signs (Most Recent):  Temp: 98.2 °F (36.8 °C) (05/23/22 1335)  Pulse: 95 (05/23/22 1335)  Resp: 16 (05/23/22 1335)  BP: 104/63 (05/23/22 1335)  SpO2: 100 % (05/23/22 1335) Vital Signs (24h Range):  Temp:  [97.2 °F (36.2 °C)-98.4 °F (36.9 °C)] 98.2 °F (36.8 °C)  Pulse:  [] 95  Resp:  [11-16] 16  SpO2:  [96 %-100 %] 100 %  BP: ()/(48-70) 104/63     Weight: 96.2 kg (212 lb)  (05/23/22 0923)  Body mass index is 28.75 kg/m².    No intake or output data in the 24 hours ending 05/23/22 1339    Lines/Drains/Airways       Peripheral Intravenous Line  Duration                  Peripheral IV - Single Lumen 05/23/22 0900 18 G Distal;Posterior;Right Forearm <1 day                    Physical Exam  Constitutional:       General: He is not in acute distress.     Appearance: Normal appearance.   HENT:      Head: Normocephalic.      Nose: No congestion or rhinorrhea.   Eyes:      Extraocular Movements: Extraocular movements intact.      Conjunctiva/sclera: Conjunctivae normal.      Pupils: Pupils are equal, round, and reactive to light.   Cardiovascular:      Rate and Rhythm: Rhythm irregular.      Heart sounds: Murmur heard.   Pulmonary:      Effort: Pulmonary effort is normal. No respiratory distress.      Breath sounds: Normal breath sounds. No stridor. No wheezing or rhonchi.   Abdominal:      General: Abdomen is flat. Bowel sounds are normal. There is no distension.      Palpations: Abdomen is soft.      Tenderness: There is no abdominal tenderness. There is no guarding.   Skin:     General: Skin is warm and dry.      Coloration: Skin is not jaundiced.   Neurological:      Mental Status: He is alert and oriented to person, place, and time.      Motor: Weakness present.   Psychiatric:         Mood and Affect: Mood normal.         Behavior: Behavior normal.       Significant Labs:  Recent Lab Results         05/23/22  1202   05/23/22  0955        Unit Blood Type Code   0600  [P]          0600  [P]       Unit Expiration   [id number]  [P]          [id number]  [P]       Albumin/Globulin Ratio   1.7       Albumin   3.3       Alkaline Phosphatase   62       ALT   41       AST   35       Baso #   0.03       Basophil %   0.5       BILIRUBIN TOTAL   0.5       BNP   679.0       BUN   34.4       Calcium   8.7       Chloride   106       Cholesterol 99         CO2   26       Creatinine   1.20        CROSSMATCH INTERPRETATION   Compatible  [P]          Compatible  [P]       DISPENSE STATUS   Issued  [P]          Selected  [P]       eGFR if non    >60       Eos #   0.21       Eosinophil %   3.4       Globulin, Total   1.9       Glucose   139       Group & Rh   A NEG       HDL 24         Hematocrit   23.7       Hemoglobin   7.7       Immature Grans (Abs)   0.07       Immature Granulocytes   1.1       Indirect Humza GEL   NEG       INR 1.92         LDL Cholesterol External 58.00         Lymph #   0.49       LYMPH %   7.9       Magnesium   2.00       MCH   32.0       MCHC   32.5       MCV   98.3       Mono #   0.33       Mono %   5.3       MPV   11.1       Neut #   5.1       Neut %   81.8       nRBC   0.0       Platelets   148       Potassium   3.5       Product Code   K9768C66  [P]          [id number]  [P]       PROTEIN TOTAL   5.2       Protime 21.6         RBC   2.41       RDW   13.6       Sodium   140       Total Cholesterol/HDL Ratio 4         Triglycerides 83         Troponin I 0.146   0.048       Unit ABO/Rh   A NEG  [P]          A NEG  [P]       UNIT NUMBER   [id number]  [P]          [id number]  [P]       Very Low Density Lipoprotein 17         WBC   6.2                [P] - Preliminary Result               Significant Imaging:  none    Assessment/Plan:     Anemia  EGD tomorrow  NPO after midnight  H/H low- receiving 2 units PRBC in ER- starting now  INR 1.92    Reports or black stools intermittently last week in conjunction with Pepto-Bismol use during a viral diarrheal illness that has resolved.   **guaiac + stool in ER**    Thank you for your consult. I will follow-up with patient. Please contact us if you have any additional questions.    MARTA Simms acting as scribe for Lino Rider MD    Gastroenterology  [facility] - Emergency Dept

## 2022-05-23 NOTE — CONSULTS
Inpatient consult to Cardiology  Consult performed by: Nash Post [facility]  Consult ordered by: Baldomero Balbuena MD        [facility] - Emergency Dept  Cardiology  Consult Note    Patient Name: Geo Barrow  MRN: [id number]  Admission Date: 5/23/2022  Hospital Length of Stay: 0 days  Code Status: Full Code   Attending Provider: Baldomero Balbuena MD   [facility]  Primary Care Physician: Reese Fragoso MD  Principal Problem:<principal problem not specified>    Patient information was obtained from patient, past medical records and ER records.     Subjective:     Chief Complaint:  Consulted for elevated troponin, aortic stenosis/pending TAVR, syncope    HPI:  This is an 81-year-old male, who is known to [facility] Jacques and [place], with history of CAD,PAF,carotid artery stenosis, HTN, HLD, severe aortic stenosis/pending TAVR, prostate cancer, CKD, anemia.  He presented to the ER on 05/23/2022 following a syncopal episode.  Of note patient is planned for TAVR on 05/24/2022.  Workup revealed acute anemia with dark stools x1 week.  Coumadin has been on hold.  BNP was 679, troponin initially was 0.48 however has been trended up slightly. INR was 1.9.  He has been admitted to hospital medicine, GI has been consulted for workup of anemia.  CIS has been consulted for elevated troponin, aortic stenosis, and syncope.    PMH: CAD,PAF,carotid artery stenosis, HTN, HLD, severe aortic stenosis/pending TAVR, prostate cancer, CKD, anemia  PSH: LHC, inguinal hernia, prostate biopsy, bilateral cataract extraction  Social History:  Former smoker quit in 1975, Denies EtOH and illicit drug use  Family History: Unknown    Previous Cardiac Diagnostics:   L/RHC 4.27.22  LM:  Short vessel with luminal regularities, but if anything there is essentially separate ostia to the LAD and circumflex.  Lad:  Lad is a large vessel which wraps around the apex of the heart.  There is luminal  irregularities proximal and mid vessel at the 1st diagonal.  LCX:  No angiographically apparent coronary artery disease.  RCA:  The RCA is a large vessel which gives rise to a PDA and PLB.  There is a mild irregularity in the mid vessel.  LVEDP 30 mm Hg.  Aortic valve:  MAREN 0.99 centimeter squared, mean gradient 40 mm Hg  Mild pulmonary hypertension.  Normal cardiac output with right heart catheterization pressures as below  PCWP 36/28  mean 31  PA 52/29 mean 40  RV 48/14  RA 18/19  Cardiac output per Anny 9.06 L/min  Cardiac index 4.3 L/min/m2    Echo 4.8.22  The study quality is average.  The left ventricle is normal in size.  Global left ventricular systolic function is normal.  The left ventricular ejection fraction appears to be 55-60%.  Left atrial diameter is moderately increased.  The right atrium is moderately enlarged.  The right ventricle is mildly enlarged.  Severe aortic valve stenosis is present.  Aortic valve area continuity equation is 0.8 cm2.  The transaortic mean gradient is 42 mm Hg.  Moderate mitral regurgitation.  Moderate tricuspid regurgitation.  Trace aortic regurgitation.  Trace pulmonic regurgitation.  The inferior vena cava is mildly increased in size with normal collapse upon inspiration.  The pulmonary artery systolic pressure is 47 mm Hg.  Evidence of pulmonary hypertension is noted.    Carotid US 4.8.22  The study quality is average.  1-39% stenosis in the bilateral internal carotid arteries  Antegrade flow in the bilateral vertebral arteries        Review of patient's allergies indicates:  No Known Allergies    No current facility-administered medications on file prior to encounter.     Current Outpatient Medications on File Prior to Encounter   Medication Sig    allopurinoL (ZYLOPRIM) 100 MG tablet Take 1 tablet by mouth once daily at 6am.    amLODIPine (NORVASC) 10 MG tablet Take 1 tablet by mouth once daily at 6am.    atorvastatin (LIPITOR) 20 MG tablet Take 1 tablet by mouth  once daily at 6am.    benazepriL (LOTENSIN) 20 MG tablet Take 1 tablet by mouth once daily at 6am.    citalopram (CELEXA) 10 MG tablet Take 1 tablet by mouth once daily at 6am.    furosemide (LASIX) 20 MG tablet Take 20 mg by mouth once daily. 3 times a week    warfarin (COUMADIN) 2.5 MG tablet Take 1 tablet by mouth daily as needed.    tamsulosin (FLOMAX) 0.4 mg Cap Take 1 capsule by mouth once daily at 6am.       Review of Systems:  Review of Systems   Constitutional: Negative.    HENT: Negative.    Eyes: Negative.    Respiratory: Negative.    Cardiovascular: Negative.    Gastrointestinal: Positive for blood in stool.   Endocrine: Negative.    Genitourinary: Negative.    Musculoskeletal: Negative.    Skin: Negative.    Allergic/Immunologic: Negative.    Neurological: Positive for syncope.   Hematological: Negative.    Psychiatric/Behavioral: Negative.        Objective:     Vital Signs (Most Recent):  Temp: 97.2 °F (36.2 °C) (05/23/22 0923)  Pulse: 84 (05/23/22 1210)  Resp: 16 (05/23/22 1210)  BP: (!) 104/58 (05/23/22 1210)  SpO2: 100 % (05/23/22 1210) Vital Signs (24h Range):  Temp:  [97.2 °F (36.2 °C)] 97.2 °F (36.2 °C)  Pulse:  [] 84  Resp:  [11-16] 16  SpO2:  [96 %-100 %] 100 %  BP: ()/(48-65) 104/58     Weight: 96.2 kg (212 lb)  Body mass index is 28.75 kg/m².    SpO2: 100 %  O2 Device (Oxygen Therapy): room air    No intake or output data in the 24 hours ending 05/23/22 1313    Lines/Drains/Airways     Peripheral Intravenous Line  Duration                Peripheral IV - Single Lumen 05/23/22 0900 18 G Distal;Posterior;Right Forearm <1 day                  Significant Labs:  Recent Lab Results       05/23/22  1202   05/23/22  0955        Unit Blood Type Code   0600  [P]          0600  [P]       Unit Expiration   [id number]  [P]          [id number]  [P]       Albumin/Globulin Ratio   1.7       Albumin   3.3       Alkaline Phosphatase   62       ALT   41       AST   35       Baso #   0.03        Basophil %   0.5       BILIRUBIN TOTAL   0.5       BNP   679.0       BUN   34.4       Calcium   8.7       Chloride   106       CO2   26       Creatinine   1.20       CROSSMATCH INTERPRETATION   Compatible  [P]          Compatible  [P]       DISPENSE STATUS   Issued  [P]          Selected  [P]       eGFR if non    >60       Eos #   0.21       Eosinophil %   3.4       Globulin, Total   1.9       Glucose   139       Group & Rh   A NEG       Hematocrit   23.7       Hemoglobin   7.7       Immature Grans (Abs)   0.07       Immature Granulocytes   1.1       Indirect Humza GEL   NEG       INR 1.92         Lymph #   0.49       LYMPH %   7.9       Magnesium   2.00       MCH   32.0       MCHC   32.5       MCV   98.3       Mono #   0.33       Mono %   5.3       MPV   11.1       Neut #   5.1       Neut %   81.8       nRBC   0.0       Platelets   148       Potassium   3.5       Product Code   [id number]  [P]          [id number]  [P]       PROTEIN TOTAL   5.2       Protime 21.6         RBC   2.41       RDW   13.6       Sodium   140       Troponin I 0.146   0.048       Unit ABO/Rh   A NEG  [P]          A NEG  [P]       UNIT NUMBER   [id number]  [P]          [id number]  [P]       WBC   6.2              [P] - Preliminary Result               Significant Imaging:   Imaging Results          X-Ray Chest AP Portable (Final result)  Result time 05/23/22 09:55:29    Final result by Sandeep Palencia MD (05/23/22 09:55:29)                 Impression:      No acute cardiopulmonary process.      Electronically signed by: Sandeep Palencia  Date:    05/23/2022  Time:    09:55             Narrative:    EXAMINATION:  XR CHEST AP PORTABLE    CLINICAL HISTORY:  syncope;    TECHNIQUE:  Single view of the chest    COMPARISON:  No prior imaging available for comparison.    FINDINGS:  No focal opacification, pleural effusion, or pneumothorax.    The cardiomediastinal silhouette is within normal limits.    No acute osseous  abnormality.                                Last Lipids:  No results found for: CHOL  No results found for: HDL  No results found for: LDLCALC  No results found for: TRIG  No results found for: CHOLHDL    EKG:  No results found for this visit on 05/23/22.    Telemetry:  SR    Physical Exam:  Physical Exam  Constitutional:       Appearance: Normal appearance.   HENT:      Head: Atraumatic.   Eyes:      Extraocular Movements: Extraocular movements intact.      Conjunctiva/sclera: Conjunctivae normal.   Cardiovascular:      Rate and Rhythm: Normal rate and regular rhythm.      Pulses: Normal pulses.      Heart sounds: Normal heart sounds.   Pulmonary:      Effort: Pulmonary effort is normal.      Breath sounds: Normal breath sounds.   Abdominal:      Palpations: Abdomen is soft.   Musculoskeletal:      Cervical back: Neck supple.   Skin:     General: Skin is warm and dry.   Neurological:      Mental Status: He is oriented to person, place, and time.   Psychiatric:         Behavior: Behavior normal.           Home Medications:   No current facility-administered medications on file prior to encounter.     Current Outpatient Medications on File Prior to Encounter   Medication Sig Dispense Refill    allopurinoL (ZYLOPRIM) 100 MG tablet Take 1 tablet by mouth once daily at 6am.      amLODIPine (NORVASC) 10 MG tablet Take 1 tablet by mouth once daily at 6am.      atorvastatin (LIPITOR) 20 MG tablet Take 1 tablet by mouth once daily at 6am.      benazepriL (LOTENSIN) 20 MG tablet Take 1 tablet by mouth once daily at 6am.      citalopram (CELEXA) 10 MG tablet Take 1 tablet by mouth once daily at 6am.      furosemide (LASIX) 20 MG tablet Take 20 mg by mouth once daily. 3 times a week      warfarin (COUMADIN) 2.5 MG tablet Take 1 tablet by mouth daily as needed.      tamsulosin (FLOMAX) 0.4 mg Cap Take 1 capsule by mouth once daily at 6am.         Current Inpatient Medications:    Current Facility-Administered  Medications:     0.9%  NaCl infusion (for blood administration), , Intravenous, Q24H PRN, Baldomero Balbuena MD    acetaminophen tablet 650 mg, 650 mg, Oral, Q8H PRN, Bobbi Rooney PA-C    acetaminophen tablet 650 mg, 650 mg, Oral, Q4H PRN, Bobbi Rooney PA-C    [START ON 5/24/2022] allopurinoL tablet 100 mg, 100 mg, Oral, Daily, Shania Benoit MD    [START ON 5/24/2022] atorvastatin tablet 20 mg, 20 mg, Oral, Daily, Shania Benoit MD    [START ON 5/24/2022] citalopram tablet 10 mg, 10 mg, Oral, Daily, Shania Benoit MD    dextrose 10% bolus 125 mL, 12.5 g, Intravenous, PRN, Baldomero Balbuena MD    dextrose 10% bolus 250 mL, 25 g, Intravenous, PRN, Baldomero Balbuena MD    glucagon (human recombinant) injection 1 mg, 1 mg, Intramuscular, PRN, Bobbi Rooney PA-C    glucose chewable tablet 16 g, 16 g, Oral, PRN, Bobbi Rooney PA-C    glucose chewable tablet 24 g, 24 g, Oral, PRN, Bobbi Rooney PA-C    ondansetron injection 4 mg, 4 mg, Intravenous, Q4H PRN, Bobbi Rooney PA-C    pantoprazole injection 40 mg, 40 mg, Intravenous, BID, Bobbi Rooney PA-C    sodium chloride 0.9% flush 10 mL, 10 mL, Intravenous, Q12H PRN, Bobbi Rooney PA-C    [START ON 5/24/2022] tamsulosin 24 hr capsule 0.4 mg, 1 capsule, Oral, Daily, Shania Benoit MD    Current Outpatient Medications:     allopurinoL (ZYLOPRIM) 100 MG tablet, Take 1 tablet by mouth once daily at 6am., Disp: , Rfl:     amLODIPine (NORVASC) 10 MG tablet, Take 1 tablet by mouth once daily at 6am., Disp: , Rfl:     atorvastatin (LIPITOR) 20 MG tablet, Take 1 tablet by mouth once daily at 6am., Disp: , Rfl:     benazepriL (LOTENSIN) 20 MG tablet, Take 1 tablet by mouth once daily at 6am., Disp: , Rfl:     citalopram (CELEXA) 10 MG tablet, Take 1 tablet by mouth once daily at 6am., Disp: , Rfl:     furosemide (LASIX) 20 MG tablet, Take 20 mg by mouth once daily. 3 times a week, Disp: , Rfl:     warfarin (COUMADIN) 2.5 MG  tablet, Take 1 tablet by mouth daily as needed., Disp: , Rfl:     tamsulosin (FLOMAX) 0.4 mg Cap, Take 1 capsule by mouth once daily at 6am., Disp: , Rfl:            Assessment:     IMPRESSION:  NSTEMI type II, supply-demand ischemia  Syncope  Anemia  ?  GI bleed, dark stools x1 week  VHD/severe AS, awaiting TAVR  Mild CAD  HTN  HLD  Bilateral carotid artery stenosis    PLAN:     Plan:  Agree with transfusion of PRBCs  Appreciate GI recs  Echo pending  NPO p MN  Will tentatively plan for TAVR in AM if cleared by GI  Continue home medciations    Thank you for your consult.     Nash Post, [facility]-BC  Cardiology  [facility] - Emergency Dept  05/23/2022 1:13 PM

## 2022-05-23 NOTE — SUBJECTIVE & OBJECTIVE
Past Medical History:   Diagnosis Date    Aortic valve stenosis     Atrial fibrillation     Hyperlipidemia     Hypertension     Stenosis of aortic and mitral valves        No past surgical history on file.    Review of patient's allergies indicates:  No Known Allergies  Family History       Problem Relation (Age of Onset)    Hypertension Mother    No Known Problems Father, Sister, Brother, Maternal Aunt, Maternal Uncle, Paternal Aunt, Paternal Uncle, Maternal Grandmother, Maternal Grandfather, Paternal Grandmother, Paternal Grandfather          Tobacco Use    Smoking status: Former Smoker    Smokeless tobacco: Never Used   Substance and Sexual Activity    Alcohol use: Not on file    Drug use: Not on file    Sexual activity: Not on file     Review of Systems   Constitutional:  Negative for chills, fever and unexpected weight change.   HENT:  Negative for mouth sores, sore throat and trouble swallowing.    Eyes:  Negative for discharge and visual disturbance.   Respiratory:  Negative for apnea, cough, shortness of breath and wheezing.    Cardiovascular:  Negative for chest pain, palpitations and leg swelling.   Gastrointestinal:  Negative for abdominal distention, abdominal pain, blood in stool, nausea and vomiting.        Denies heartburn, nausea, vomiting, dysphagia, globus sensation, hematemesis, epigastric pain.   Denies Abdominal pain, tenderness  Denies constipation, BRBPR  Reports recent bout of diarrheal illness last week with last stool 2 days ago. Reports sick contacts in family as well.    Skin:  Negative for color change.   Neurological:  Positive for weakness. Negative for dizziness, syncope and headaches.   Psychiatric/Behavioral:  Negative for agitation, confusion and hallucinations.         Forgetful of past details   Objective:     Vital Signs (Most Recent):  Temp: 98.2 °F (36.8 °C) (05/23/22 1335)  Pulse: 95 (05/23/22 1335)  Resp: 16 (05/23/22 1335)  BP: 104/63 (05/23/22 1335)  SpO2: 100 % (05/23/22  1335) Vital Signs (24h Range):  Temp:  [97.2 °F (36.2 °C)-98.4 °F (36.9 °C)] 98.2 °F (36.8 °C)  Pulse:  [] 95  Resp:  [11-16] 16  SpO2:  [96 %-100 %] 100 %  BP: ()/(48-70) 104/63     Weight: 96.2 kg (212 lb) (05/23/22 0923)  Body mass index is 28.75 kg/m².    No intake or output data in the 24 hours ending 05/23/22 1339    Lines/Drains/Airways       Peripheral Intravenous Line  Duration                  Peripheral IV - Single Lumen 05/23/22 0900 18 G Distal;Posterior;Right Forearm <1 day                    Physical Exam  Constitutional:       General: He is not in acute distress.     Appearance: Normal appearance.   HENT:      Head: Normocephalic.      Nose: No congestion or rhinorrhea.   Eyes:      Extraocular Movements: Extraocular movements intact.      Conjunctiva/sclera: Conjunctivae normal.      Pupils: Pupils are equal, round, and reactive to light.   Cardiovascular:      Rate and Rhythm: Rhythm irregular.      Heart sounds: Murmur heard.   Pulmonary:      Effort: Pulmonary effort is normal. No respiratory distress.      Breath sounds: Normal breath sounds. No stridor. No wheezing or rhonchi.   Abdominal:      General: Abdomen is flat. Bowel sounds are normal. There is no distension.      Palpations: Abdomen is soft.      Tenderness: There is no abdominal tenderness. There is no guarding.   Skin:     General: Skin is warm and dry.      Coloration: Skin is not jaundiced.   Neurological:      Mental Status: He is alert and oriented to person, place, and time.      Motor: Weakness present.   Psychiatric:         Mood and Affect: Mood normal.         Behavior: Behavior normal.       Significant Labs:  Recent Lab Results         05/23/22  1202   05/23/22  0955        Unit Blood Type Code   0600  [P]          0600  [P]       Unit Expiration   [id number]  [P]          [id number]  [P]       Albumin/Globulin Ratio   1.7       Albumin   3.3       Alkaline Phosphatase   62       ALT   41       AST   35        Baso #   0.03       Basophil %   0.5       BILIRUBIN TOTAL   0.5       BNP   679.0       BUN   34.4       Calcium   8.7       Chloride   106       Cholesterol 99         CO2   26       Creatinine   1.20       CROSSMATCH INTERPRETATION   Compatible  [P]          Compatible  [P]       DISPENSE STATUS   Issued  [P]          Selected  [P]       eGFR if non    >60       Eos #   0.21       Eosinophil %   3.4       Globulin, Total   1.9       Glucose   139       Group & Rh   A NEG       HDL 24         Hematocrit   23.7       Hemoglobin   7.7       Immature Grans (Abs)   0.07       Immature Granulocytes   1.1       Indirect Humza GEL   NEG       INR 1.92         LDL Cholesterol External 58.00         Lymph #   0.49       LYMPH %   7.9       Magnesium   2.00       MCH   32.0       MCHC   32.5       MCV   98.3       Mono #   0.33       Mono %   5.3       MPV   11.1       Neut #   5.1       Neut %   81.8       nRBC   0.0       Platelets   148       Potassium   3.5       Product Code   I1169T76  [P]          [id number]  [P]       PROTEIN TOTAL   5.2       Protime 21.6         RBC   2.41       RDW   13.6       Sodium   140       Total Cholesterol/HDL Ratio 4         Triglycerides 83         Troponin I 0.146   0.048       Unit ABO/Rh   A NEG  [P]          A NEG  [P]       UNIT NUMBER   [id number]  [P]          [id number]  [P]       Very Low Density Lipoprotein 17         WBC   6.2                [P] - Preliminary Result               Significant Imaging:  none

## 2022-05-23 NOTE — HPI
"81 year old WM presented to ED with syncopal episodes today.           ETOH: daily beer: 2-6 beers per day for many years  NSAID: denies  Anticoagulant/antiplatelet: Coumadin with last dose 5/18/22 in preparation for TAVR on 5/24/22      Previous Endoscopy includes previous colonoscopies- routine with Dr. Guzman in [place]. He is scheduled for colonoscopy "this summer" for routine colonoscopy. Denies history of colon polyps.     No previous EGD    Denies family history of GI cancers or concerns  "

## 2022-05-24 ENCOUNTER — ANESTHESIA EVENT (OUTPATIENT)
Dept: ENDOSCOPY | Facility: HOSPITAL | Age: 82
DRG: 811 | End: 2022-05-24
Payer: MEDICARE

## 2022-05-24 ENCOUNTER — ANESTHESIA (OUTPATIENT)
Dept: ENDOSCOPY | Facility: HOSPITAL | Age: 82
DRG: 811 | End: 2022-05-24
Payer: MEDICARE

## 2022-05-24 LAB
ALBUMIN SERPL-MCNC: 3.2 GM/DL (ref 3.4–4.8)
ALBUMIN/GLOB SERPL: 1.8 RATIO (ref 1.1–2)
ALP SERPL-CCNC: 61 UNIT/L (ref 40–150)
ALT SERPL-CCNC: 36 UNIT/L (ref 0–55)
AST SERPL-CCNC: 33 UNIT/L (ref 5–34)
BASOPHILS # BLD AUTO: 0.05 X10(3)/MCL (ref 0–0.2)
BASOPHILS NFR BLD AUTO: 1 %
BILIRUBIN DIRECT+TOT PNL SERPL-MCNC: 0.7 MG/DL
BUN SERPL-MCNC: 23.2 MG/DL (ref 8.4–25.7)
CALCIUM SERPL-MCNC: 8.6 MG/DL (ref 8.8–10)
CHLORIDE SERPL-SCNC: 107 MMOL/L (ref 98–107)
CO2 SERPL-SCNC: 25 MMOL/L (ref 23–31)
CREAT SERPL-MCNC: 1.07 MG/DL (ref 0.73–1.18)
EOSINOPHIL # BLD AUTO: 0.24 X10(3)/MCL (ref 0–0.9)
EOSINOPHIL NFR BLD AUTO: 4.6 %
ERYTHROCYTE [DISTWIDTH] IN BLOOD BY AUTOMATED COUNT: 14.7 % (ref 11.5–17)
GLOBULIN SER-MCNC: 1.8 GM/DL (ref 2.4–3.5)
GLUCOSE SERPL-MCNC: 87 MG/DL (ref 82–115)
HCT VFR BLD AUTO: 27.3 % (ref 42–52)
HGB BLD-MCNC: 9 GM/DL (ref 14–18)
IMM GRANULOCYTES # BLD AUTO: 0.05 X10(3)/MCL (ref 0–0.02)
IMM GRANULOCYTES NFR BLD AUTO: 1 % (ref 0–0.43)
LYMPHOCYTES # BLD AUTO: 0.77 X10(3)/MCL (ref 0.6–4.6)
LYMPHOCYTES NFR BLD AUTO: 14.6 %
MCH RBC QN AUTO: 32.1 PG (ref 27–31)
MCHC RBC AUTO-ENTMCNC: 33 MG/DL (ref 33–36)
MCV RBC AUTO: 97.5 FL (ref 80–94)
MONOCYTES # BLD AUTO: 0.44 X10(3)/MCL (ref 0.1–1.3)
MONOCYTES NFR BLD AUTO: 8.4 %
NEUTROPHILS # BLD AUTO: 3.7 X10(3)/MCL (ref 2.1–9.2)
NEUTROPHILS NFR BLD AUTO: 70.4 %
NRBC BLD AUTO-RTO: 0 %
PLATELET # BLD AUTO: 160 X10(3)/MCL (ref 130–400)
PMV BLD AUTO: 11.3 FL (ref 9.4–12.4)
POTASSIUM SERPL-SCNC: 3.4 MMOL/L (ref 3.5–5.1)
PROT SERPL-MCNC: 5 GM/DL (ref 5.8–7.6)
RBC # BLD AUTO: 2.8 X10(6)/MCL (ref 4.7–6.1)
SODIUM SERPL-SCNC: 139 MMOL/L (ref 136–145)
WBC # SPEC AUTO: 5.3 X10(3)/MCL (ref 4.5–11.5)

## 2022-05-24 PROCEDURE — 27201423 OPTIME MED/SURG SUP & DEVICES STERILE SUPPLY: Performed by: INTERNAL MEDICINE

## 2022-05-24 PROCEDURE — 37000008 HC ANESTHESIA 1ST 15 MINUTES: Performed by: INTERNAL MEDICINE

## 2022-05-24 PROCEDURE — 25000003 PHARM REV CODE 250: Performed by: NURSE ANESTHETIST, CERTIFIED REGISTERED

## 2022-05-24 PROCEDURE — 25000003 PHARM REV CODE 250: Performed by: INTERNAL MEDICINE

## 2022-05-24 PROCEDURE — 11000001 HC ACUTE MED/SURG PRIVATE ROOM

## 2022-05-24 PROCEDURE — 63600175 PHARM REV CODE 636 W HCPCS: Performed by: NURSE ANESTHETIST, CERTIFIED REGISTERED

## 2022-05-24 PROCEDURE — 63600175 PHARM REV CODE 636 W HCPCS: Performed by: PHYSICIAN ASSISTANT

## 2022-05-24 PROCEDURE — 37000009 HC ANESTHESIA EA ADD 15 MINS: Performed by: INTERNAL MEDICINE

## 2022-05-24 PROCEDURE — 85025 COMPLETE CBC W/AUTO DIFF WBC: CPT | Performed by: INTERNAL MEDICINE

## 2022-05-24 PROCEDURE — 25000003 PHARM REV CODE 250: Performed by: ANESTHESIOLOGY

## 2022-05-24 PROCEDURE — C9113 INJ PANTOPRAZOLE SODIUM, VIA: HCPCS | Performed by: PHYSICIAN ASSISTANT

## 2022-05-24 PROCEDURE — 36415 COLL VENOUS BLD VENIPUNCTURE: CPT | Performed by: INTERNAL MEDICINE

## 2022-05-24 PROCEDURE — 43239 EGD BIOPSY SINGLE/MULTIPLE: CPT | Performed by: INTERNAL MEDICINE

## 2022-05-24 PROCEDURE — 80053 COMPREHEN METABOLIC PANEL: CPT | Performed by: INTERNAL MEDICINE

## 2022-05-24 RX ORDER — ONDANSETRON 2 MG/ML
4 INJECTION INTRAMUSCULAR; INTRAVENOUS DAILY PRN
Status: CANCELLED | OUTPATIENT
Start: 2022-05-24

## 2022-05-24 RX ORDER — MEPERIDINE HYDROCHLORIDE 25 MG/ML
12.5 INJECTION INTRAMUSCULAR; INTRAVENOUS; SUBCUTANEOUS EVERY 10 MIN PRN
Status: CANCELLED | OUTPATIENT
Start: 2022-05-24 | End: 2022-05-25

## 2022-05-24 RX ORDER — PHENYLEPHRINE HYDROCHLORIDE 10 MG/ML
INJECTION INTRAVENOUS
Status: DISCONTINUED | OUTPATIENT
Start: 2022-05-24 | End: 2022-05-24

## 2022-05-24 RX ORDER — LIDOCAINE HYDROCHLORIDE 10 MG/ML
INJECTION, SOLUTION EPIDURAL; INFILTRATION; INTRACAUDAL; PERINEURAL
Status: DISPENSED
Start: 2022-05-24 | End: 2022-05-24

## 2022-05-24 RX ORDER — ETOMIDATE 2 MG/ML
INJECTION INTRAVENOUS
Status: DISCONTINUED | OUTPATIENT
Start: 2022-05-24 | End: 2022-05-24

## 2022-05-24 RX ORDER — SODIUM CHLORIDE, SODIUM GLUCONATE, SODIUM ACETATE, POTASSIUM CHLORIDE AND MAGNESIUM CHLORIDE 30; 37; 368; 526; 502 MG/100ML; MG/100ML; MG/100ML; MG/100ML; MG/100ML
1000 INJECTION, SOLUTION INTRAVENOUS CONTINUOUS
Status: DISCONTINUED | OUTPATIENT
Start: 2022-05-24 | End: 2022-05-24

## 2022-05-24 RX ORDER — SODIUM CHLORIDE 0.9 % (FLUSH) 0.9 %
10 SYRINGE (ML) INJECTION
Status: DISCONTINUED | OUTPATIENT
Start: 2022-05-24 | End: 2022-05-25 | Stop reason: HOSPADM

## 2022-05-24 RX ORDER — POTASSIUM CHLORIDE 20 MEQ/1
40 TABLET, EXTENDED RELEASE ORAL ONCE
Status: COMPLETED | OUTPATIENT
Start: 2022-05-24 | End: 2022-05-24

## 2022-05-24 RX ORDER — LORAZEPAM 2 MG/ML
0.25 INJECTION INTRAMUSCULAR ONCE AS NEEDED
Status: CANCELLED | OUTPATIENT
Start: 2022-05-24 | End: 2033-10-20

## 2022-05-24 RX ORDER — LISINOPRIL 10 MG/1
10 TABLET ORAL DAILY
Status: DISCONTINUED | OUTPATIENT
Start: 2022-05-25 | End: 2022-05-25 | Stop reason: HOSPADM

## 2022-05-24 RX ORDER — ONDANSETRON 4 MG/1
8 TABLET, ORALLY DISINTEGRATING ORAL EVERY 6 HOURS PRN
Status: CANCELLED | OUTPATIENT
Start: 2022-05-24

## 2022-05-24 RX ORDER — SODIUM CHLORIDE, SODIUM GLUCONATE, SODIUM ACETATE, POTASSIUM CHLORIDE AND MAGNESIUM CHLORIDE 30; 37; 368; 526; 502 MG/100ML; MG/100ML; MG/100ML; MG/100ML; MG/100ML
1000 INJECTION, SOLUTION INTRAVENOUS CONTINUOUS
Status: CANCELLED | OUTPATIENT
Start: 2022-05-24 | End: 2022-06-23

## 2022-05-24 RX ORDER — AMLODIPINE BESYLATE 5 MG/1
10 TABLET ORAL DAILY
Status: DISCONTINUED | OUTPATIENT
Start: 2022-05-25 | End: 2022-05-25 | Stop reason: HOSPADM

## 2022-05-24 RX ORDER — PROPOFOL 10 MG/ML
VIAL (ML) INTRAVENOUS
Status: COMPLETED
Start: 2022-05-24 | End: 2022-05-24

## 2022-05-24 RX ORDER — LIDOCAINE HYDROCHLORIDE 20 MG/ML
INJECTION, SOLUTION EPIDURAL; INFILTRATION; INTRACAUDAL; PERINEURAL
Status: DISCONTINUED | OUTPATIENT
Start: 2022-05-24 | End: 2022-05-24

## 2022-05-24 RX ORDER — PROPOFOL 10 MG/ML
VIAL (ML) INTRAVENOUS
Status: DISCONTINUED | OUTPATIENT
Start: 2022-05-24 | End: 2022-05-24

## 2022-05-24 RX ORDER — LIDOCAINE HYDROCHLORIDE 10 MG/ML
1 INJECTION, SOLUTION EPIDURAL; INFILTRATION; INTRACAUDAL; PERINEURAL ONCE
Status: CANCELLED | OUTPATIENT
Start: 2022-05-24 | End: 2022-05-24

## 2022-05-24 RX ORDER — ETOMIDATE 2 MG/ML
INJECTION INTRAVENOUS
Status: COMPLETED
Start: 2022-05-24 | End: 2022-05-24

## 2022-05-24 RX ORDER — IPRATROPIUM BROMIDE AND ALBUTEROL SULFATE 2.5; .5 MG/3ML; MG/3ML
3 SOLUTION RESPIRATORY (INHALATION)
Status: DISCONTINUED | OUTPATIENT
Start: 2022-05-24 | End: 2022-05-25 | Stop reason: HOSPADM

## 2022-05-24 RX ADMIN — PROPOFOL 20 MG: 10 INJECTION, EMULSION INTRAVENOUS at 12:05

## 2022-05-24 RX ADMIN — LIDOCAINE HYDROCHLORIDE 4 ML: 20 INJECTION, SOLUTION INTRAVENOUS at 12:05

## 2022-05-24 RX ADMIN — PANTOPRAZOLE SODIUM 40 MG: 40 INJECTION, POWDER, FOR SOLUTION INTRAVENOUS at 06:05

## 2022-05-24 RX ADMIN — ETOMIDATE 4 MG: 2 INJECTION INTRAVENOUS at 12:05

## 2022-05-24 RX ADMIN — POTASSIUM CHLORIDE 40 MEQ: 1500 TABLET, EXTENDED RELEASE ORAL at 08:05

## 2022-05-24 RX ADMIN — PHENYLEPHRINE HYDROCHLORIDE 50 MCG: 10 INJECTION INTRAVENOUS at 12:05

## 2022-05-24 RX ADMIN — SODIUM CHLORIDE, SODIUM GLUCONATE, SODIUM ACETATE, POTASSIUM CHLORIDE AND MAGNESIUM CHLORIDE: 526; 502; 368; 37; 30 INJECTION, SOLUTION INTRAVENOUS at 12:05

## 2022-05-24 RX ADMIN — PROPOFOL 10 MG: 10 INJECTION, EMULSION INTRAVENOUS at 01:05

## 2022-05-24 RX ADMIN — PROPOFOL 10 MG: 10 INJECTION, EMULSION INTRAVENOUS at 12:05

## 2022-05-24 RX ADMIN — ETOMIDATE 2 MG: 2 INJECTION INTRAVENOUS at 12:05

## 2022-05-24 RX ADMIN — SODIUM CHLORIDE, SODIUM GLUCONATE, SODIUM ACETATE, POTASSIUM CHLORIDE AND MAGNESIUM CHLORIDE 1000 ML: 526; 502; 368; 37; 30 INJECTION, SOLUTION INTRAVENOUS at 12:05

## 2022-05-24 RX ADMIN — PANTOPRAZOLE SODIUM 40 MG: 40 INJECTION, POWDER, FOR SOLUTION INTRAVENOUS at 08:05

## 2022-05-24 NOTE — ANESTHESIA PREPROCEDURE EVALUATION
05/24/2022  Geo Barrow is a 81 y.o., male admitted April 23rd with syncope and melena with further workup revealing moderate anemia requiring transfusion.  Patient has been off of his Coumadin for approximately 1 week in anticipation of TAVR on April 24th for severe aortic stenosis (EF 50% with moderate to severe MR).  He presents to the GI lab today for EGD secondary to symptomatic anemia.      Transthoracic echo May 8, 2022  EF 55-60%  Severe aortic stenosis (MAREN 0.8 centimeters sq with mean gradient of 42)  2+ MR/TR, trace AI/PI  PA pressure 47    Left heart catheterization April 2022  Luminal irregularities  Last 3 sets of Vitals    Vitals - 1 value per visit 5/24/2022 5/24/2022 5/24/2022   SYSTOLIC 121 136 136   DIASTOLIC 71 89 89   Pulse 85 - 83   Temp 98.1 - 98.1   Resp 18 - 17   SPO2 97 - 99   Weight (lb) - - 212   Weight (kg) - - 96.163   Height - - 72   BMI (Calculated) - - 28.7   VISIT REPORT - - -         Lab Results   Component Value Date    WBC 5.3 05/24/2022    HGB 9.0 (L) 05/24/2022    HCT 27.3 (L) 05/24/2022    MCV 97.5 (H) 05/24/2022     05/24/2022          BMP  Lab Results   Component Value Date     05/24/2022    K 3.4 (L) 05/24/2022    CO2 25 05/24/2022    BUN 23.2 05/24/2022    CREATININE 1.07 05/24/2022    CALCIUM 8.6 (L) 05/24/2022    EGFRNONAA >60 05/24/2022        CMP  Sodium Level   Date Value Ref Range Status   05/24/2022 139 136 - 145 mmol/L Final     Potassium Level   Date Value Ref Range Status   05/24/2022 3.4 (L) 3.5 - 5.1 mmol/L Final     Carbon Dioxide   Date Value Ref Range Status   05/24/2022 25 23 - 31 mmol/L Final     Blood Urea Nitrogen   Date Value Ref Range Status   05/24/2022 23.2 8.4 - 25.7 mg/dL Final     Creatinine   Date Value Ref Range Status   05/24/2022 1.07 0.73 - 1.18 mg/dL Final     Calcium Level Total   Date Value Ref Range Status   05/24/2022  8.6 (L) 8.8 - 10.0 mg/dL Final     Albumin Level   Date Value Ref Range Status   05/24/2022 3.2 (L) 3.4 - 4.8 gm/dL Final     Bilirubin Total   Date Value Ref Range Status   05/24/2022 0.7 <=1.5 mg/dL Final     Alkaline Phosphatase   Date Value Ref Range Status   05/24/2022 61 40 - 150 unit/L Final     Aspartate Aminotransferase   Date Value Ref Range Status   05/24/2022 33 5 - 34 unit/L Final     Alanine Aminotransferase   Date Value Ref Range Status   05/24/2022 36 0 - 55 unit/L Final     Estimated GFR-Non    Date Value Ref Range Status   05/24/2022 >60 mls/min/1.73/m2 Final        BNP  Recent Labs   Lab 05/23/22  0955   .0*        Recent Labs   Lab 05/23/22  1202   TROPONINI 0.146*        Lab Results   Component Value Date    INR 1.92 (H) 05/23/2022    PROTIME 21.6 (H) 05/23/2022     Results for orders placed during the hospital encounter of 05/23/22    Echo    Interpretation Summary  · Mild concentric hypertrophy and low normal systolic function.  · The estimated ejection fraction is 50%.  · There is severe aortic valve stenosis. Aortic valve area is 0.6 cm2; peak velocity is 4.94 m/s; mean gradient is 62 mmHg.  · Moderate-to-severe mitral regurgitation.  · Mild tricuspid regurgitation. The estimated PA systolic pressure is 38 mmHg.  · Aortic valve area is 0.45 cm2; peak velocity is 4.94 m/s; mean gradient is 62 mmHg.       Pre-op Assessment    I have reviewed the Patient Summary Reports.     I have reviewed the Nursing Notes. I have reviewed the NPO Status.   I have reviewed the Medications.     Review of Systems  Anesthesia Hx:  No problems with previous Anesthesia  History of prior surgery of interest to airway management or planning:   Cardiovascular:   Hypertension  Functional Capacity low / < 4 METS  Valvular Heart Disease: Aortic Stenosis (AS), severe Mitral Regurgitation (MR), moderate   Hypertension  Disorder of Cardiac Rhythm, Atrial Fibrillation        Physical Exam  General:  Well nourished, Cooperative, Alert and Oriented    Airway:  Mallampati: II   Mouth Opening: Normal  TM Distance: Normal  Tongue: Normal  Neck ROM: Normal ROM    Dental:  Intact  Multiple missing teeth with partial removed  Chest/Lungs:  Clear to auscultation, Normal Respiratory Rate    Heart:  Rate: Normal  Rhythm: Regular Rhythm  Murmur: Systolic;        Anesthesia Plan  Type of Anesthesia, risks & benefits discussed:    Anesthesia Type: Gen Natural Airway  Intra-op Monitoring Plan: Standard ASA Monitors  Induction:  IV  Informed Consent: Informed consent signed with the Patient and all parties understand the risks and agree with anesthesia plan.  All questions answered.   ASA Score: 4  Anesthesia Plan Notes: Etomidate>> propofol    Ready For Surgery From Anesthesia Perspective.     .

## 2022-05-24 NOTE — PROGRESS NOTES
[facility] - Emergency Dept  Cardiology  Progress Note    Patient Name: Geo Barrow  MRN: [id number]  Admission Date: 5/23/2022  Hospital Length of Stay: 1 days  Code Status: Full Code   Attending Provider: Shania Benoit MD   Consulting Provider: MARTA Dasilva  Primary Care Physician: Reese Fragoso MD  Principal Problem:Anemia    Patient information was obtained from patient, past medical records and ER records.     Subjective:     Chief Complaint:  Consulted for elevated troponin, aortic stenosis/pending TAVR, syncope    HPI:  This is an 81-year-old male, who is known to [facility] and [place], with history of CAD,PAF,carotid artery stenosis, HTN, HLD, severe aortic stenosis/pending TAVR, prostate cancer, CKD, anemia.  He presented to the ER on 05/23/2022 following a syncopal episode.  Of note patient is planned for TAVR on 05/24/2022.  Workup revealed acute anemia with dark stools x1 week.  Coumadin has been on hold.  BNP was 679, troponin initially was 0.48 however has been trended up slightly. INR was 1.9.  He has been admitted to hospital medicine, GI has been consulted for workup of anemia.  [facility] has been consulted for elevated troponin, aortic stenosis, and syncope.    5.24.22 NPO for EGD today. BP low normal. Denies CP/SOB/Palps.    PMH: CAD,PAF,carotid artery stenosis, HTN, HLD, severe aortic stenosis/pending TAVR, prostate cancer, CKD, anemia  PSH: LHC, inguinal hernia, prostate biopsy, bilateral cataract extraction  Social History:  Former smoker quit in 1975, Denies EtOH and illicit drug use  Family History: Unknown    Previous Cardiac Diagnostics:   Echo 5.23.22  Mild concentric hypertrophy and low normal systolic function.  The estimated ejection fraction is 50%.  There is severe aortic valve stenosis. Aortic valve area is 0.6 cm2; peak velocity is 4.94 m/s; mean gradient is 62 mmHg.  Moderate-to-severe mitral regurgitation.  Mild tricuspid regurgitation. The estimated PA  systolic pressure is 38 mmHg.  Aortic valve area is 0.45 cm2; peak velocity is 4.94 m/s; mean gradient is 62 mmHg.       L/RHC 4.27.22  LM:  Short vessel with luminal regularities, but if anything there is essentially separate ostia to the LAD and circumflex.  Lad:  Lad is a large vessel which wraps around the apex of the heart.  There is luminal irregularities proximal and mid vessel at the 1st diagonal.  LCX:  No angiographically apparent coronary artery disease.  RCA:  The RCA is a large vessel which gives rise to a PDA and PLB.  There is a mild irregularity in the mid vessel.  LVEDP 30 mm Hg.  Aortic valve:  MAREN 0.99 centimeter squared, mean gradient 40 mm Hg  Mild pulmonary hypertension.  Normal cardiac output with right heart catheterization pressures as below  PCWP 36/28  mean 31  PA 52/29 mean 40  RV 48/14  RA 18/19  Cardiac output per Anny 9.06 L/min  Cardiac index 4.3 L/min/m2    Echo 4.8.22  The study quality is average.  The left ventricle is normal in size.  Global left ventricular systolic function is normal.  The left ventricular ejection fraction appears to be 55-60%.  Left atrial diameter is moderately increased.  The right atrium is moderately enlarged.  The right ventricle is mildly enlarged.  Severe aortic valve stenosis is present.  Aortic valve area continuity equation is 0.8 cm2.  The transaortic mean gradient is 42 mm Hg.  Moderate mitral regurgitation.  Moderate tricuspid regurgitation.  Trace aortic regurgitation.  Trace pulmonic regurgitation.  The inferior vena cava is mildly increased in size with normal collapse upon inspiration.  The pulmonary artery systolic pressure is 47 mm Hg.  Evidence of pulmonary hypertension is noted.    Carotid US 4.8.22  The study quality is average.  1-39% stenosis in the bilateral internal carotid arteries  Antegrade flow in the bilateral vertebral arteries        Review of patient's allergies indicates:  No Known Allergies    No current  facility-administered medications on file prior to encounter.     Current Outpatient Medications on File Prior to Encounter   Medication Sig    allopurinoL (ZYLOPRIM) 100 MG tablet Take 1 tablet by mouth once daily at 6am.    amLODIPine (NORVASC) 10 MG tablet Take 1 tablet by mouth once daily at 6am.    atorvastatin (LIPITOR) 20 MG tablet Take 1 tablet by mouth once daily at 6am.    benazepriL (LOTENSIN) 20 MG tablet Take 1 tablet by mouth once daily at 6am.    citalopram (CELEXA) 10 MG tablet Take 1 tablet by mouth once daily at 6am.    furosemide (LASIX) 20 MG tablet Take 20 mg by mouth once daily. 3 times a week    warfarin (COUMADIN) 2.5 MG tablet Take 1 tablet by mouth daily as needed.    tamsulosin (FLOMAX) 0.4 mg Cap Take 1 capsule by mouth once daily at 6am.       Review of Systems:  Review of Systems   Constitutional: Negative.    HENT: Negative.    Eyes: Negative.    Respiratory: Negative.    Cardiovascular: Negative.    Gastrointestinal: Positive for blood in stool.   Endocrine: Negative.    Genitourinary: Negative.    Musculoskeletal: Negative.    Skin: Negative.    Allergic/Immunologic: Negative.    Neurological: Positive for syncope.   Hematological: Negative.    Psychiatric/Behavioral: Negative.        Objective:     Vital Signs (Most Recent):  Temp: 98.1 °F (36.7 °C) (05/24/22 0720)  Pulse: 85 (05/24/22 0720)  Resp: 18 (05/24/22 0720)  BP: 121/71 (05/24/22 0720)  SpO2: 97 % (05/24/22 0720) Vital Signs (24h Range):  Temp:  [97.2 °F (36.2 °C)-98.4 °F (36.9 °C)] 98.1 °F (36.7 °C)  Pulse:  [] 85  Resp:  [11-23] 18  SpO2:  [95 %-100 %] 97 %  BP: ()/(48-84) 121/71     Weight: 96.7 kg (213 lb 3 oz)  Body mass index is 28.87 kg/m².    SpO2: 97 %  O2 Device (Oxygen Therapy): room air      Intake/Output Summary (Last 24 hours) at 5/24/2022 0843  Last data filed at 5/24/2022 0600  Gross per 24 hour   Intake 600 ml   Output 500 ml   Net 100 ml       Lines/Drains/Airways     Peripheral  Intravenous Line  Duration                Peripheral IV - Single Lumen 05/23/22 0900 18 G Distal;Posterior;Right Forearm <1 day                  Significant Labs:  Recent Lab Results  (Last 5 results in the past 72 hours)      05/24/22  0402   05/23/22  2122   05/23/22  1352   05/23/22  1310   05/23/22  1202        Albumin/Globulin Ratio 1.8               Albumin 3.2               Alkaline Phosphatase 61               ALT 36               Ao peak robbie     4.94           Ao VTI     117.0           AST 33               AV valve area     0.45           AV mean gradient     62           AV index (prosthetic)     0.14           AV peak gradient     98           AV Velocity Ratio     0.16           Baso # 0.05               Basophil % 1.0               BILIRUBIN TOTAL 0.7               BSA     2.21           BUN 23.2               Calcium 8.6               Chloride 107               Cholesterol         99       CO2 25               Creatinine 1.07               Left Ventricle Relative Wall Thickness     0.86           E/E' ratio     14.64           eGFR if non  >60               EF     50           Eos # 0.24               Eosinophil % 4.6               FS     25           Globulin, Total 1.8               Glucose 87               HDL         24       Hematocrit 27.3   26.8             Hemoglobin 9.0   9.0             Immature Grans (Abs) 0.05               Immature Granulocytes 1.0               INR         1.92       IVSd     1.72           LA size     4.70           LA volume     168.00           LA Volume Index (Mod)     77.1           Left CCA dist torres       15         Left CCA dist sys       48         Left CCA prox torres       17         Left CCA prox sys       87         LVOT area     3.1           LDL Cholesterol External         58.00       Left ECA sys       49         Left ICA dist torres       17         Left ICA dist sys       51         Left ICA mid torres       24         Left ICA mid sys        87         Left ICA prox torres       25         Left ICA prox sys       89         LV LATERAL E/E' RATIO     14.64           LV SEPTAL E/E' RATIO     14.64           LV EDV BP     77.70           LV Diastolic Volume Index     35.64           Left vertebral sys       41         LVIDd     4.18           LVIDs     3.13           LV mass     318.19           LV Mass Index     146           Left Ventricular Outflow Tract Mean Gradient     1.00           Left Ventricular Outflow Tract Mean Velocity     0.539           LVOT diameter     2.00           LVOT peak maxi     0.77           LVOT stroke volume     52.44           LVOT peak VTI     16.70           LV ESV BP     38.80           LV Systolic Volume Index     17.8           Lymph # 0.77               LYMPH % 14.6               MCH 32.1               MCHC 33.0               MCV 97.5               Mean e'     0.11           Mono # 0.44               Mono % 8.4               MPV 11.3               Mr max maxi     6.03           MR PISA EROA     0.13           MV valve area p 1/2 method     4.31           MV valve area by continuity eq     1.84           MV mean gradient     3           MV peak gradient     8           MV Peak E Maxi     1.61           MV stenosis pressure 1/2 time     51           MV VTI     28.5           Neut # 3.7               Neut % 70.4               nRBC 0.0               Right Highest EDV       25.00         Left ICA/CCA ratio       1.85         Right ICA/CCA ratio       1.46         Left Highest CCA       87         Left Highest ICA       89.00         Right Highest CCA       83         Right Highest ICA       73.00         LT Highest EDV       25.00         Radius     0.50           Platelets 160               Potassium 3.4               PROTEIN TOTAL 5.0               Protime         21.6       PV PEAK VELOCITY     1.18           Posterior Wall     1.79           Right Atrial Pressure (from IVC)     15           RBC 2.80               Right CCA  dist torres       13         Right CCA dist sys       50         Right CCA prox torres       7         Right CCA prox sys       83         RDW 14.7               Right ECA torres       47         Right ECA sys       55         Right ICA dist torres       25         Right ICA dist sys       53         Right ICA mid torres       8         Right ICA mid sys       73         Right ICA prox torres       11         Right ICA prox sys       55         RVDD     2.99           Right vertebral sys       80         Sodium 139               TAPSE     2.14           TDI SEPTAL     0.11           TDI LATERAL     0.11           Total Cholesterol/HDL Ratio         4       Triglycerides         83       Triscuspid Valve Regurgitation Peak Gradient     23           TR Max Maxi     2.40           Troponin I         0.146       TV rest pulmonary artery pressure     38           Very Low Density Lipoprotein         17       Vn Nyquist     0.50           Vn Nyquist MS     0.50           WBC 5.3                                      Significant Imaging:   Imaging Results          X-Ray Chest AP Portable (Final result)  Result time 05/23/22 09:55:29    Final result by Sandeep Palencia MD (05/23/22 09:55:29)                 Impression:      No acute cardiopulmonary process.      Electronically signed by: Sandeep Palencia  Date:    05/23/2022  Time:    09:55             Narrative:    EXAMINATION:  XR CHEST AP PORTABLE    CLINICAL HISTORY:  syncope;    TECHNIQUE:  Single view of the chest    COMPARISON:  No prior imaging available for comparison.    FINDINGS:  No focal opacification, pleural effusion, or pneumothorax.    The cardiomediastinal silhouette is within normal limits.    No acute osseous abnormality.                                Last Lipids:  Lab Results   Component Value Date    CHOL 99 05/23/2022     Lab Results   Component Value Date    HDL 24 (L) 05/23/2022     No results found for: LDLCALC  Lab Results   Component Value Date    TRIG 83  05/23/2022     No results found for: CHOLHDL    EKG:      Telemetry:  Atrial Fibrillation    Physical Exam:  Physical Exam  Constitutional:       Appearance: Normal appearance.   HENT:      Head: Atraumatic.   Eyes:      Extraocular Movements: Extraocular movements intact.      Conjunctiva/sclera: Conjunctivae normal.   Cardiovascular:      Rate and Rhythm: Normal rate. Rhythm irregular.      Pulses: Normal pulses.      Heart sounds: Normal heart sounds.   Pulmonary:      Effort: Pulmonary effort is normal.      Breath sounds: Normal breath sounds.   Abdominal:      Palpations: Abdomen is soft.   Musculoskeletal:      Cervical back: Neck supple.   Skin:     General: Skin is warm and dry.   Neurological:      Mental Status: He is oriented to person, place, and time.   Psychiatric:         Behavior: Behavior normal.           Home Medications:   No current facility-administered medications on file prior to encounter.     Current Outpatient Medications on File Prior to Encounter   Medication Sig Dispense Refill    allopurinoL (ZYLOPRIM) 100 MG tablet Take 1 tablet by mouth once daily at 6am.      amLODIPine (NORVASC) 10 MG tablet Take 1 tablet by mouth once daily at 6am.      atorvastatin (LIPITOR) 20 MG tablet Take 1 tablet by mouth once daily at 6am.      benazepriL (LOTENSIN) 20 MG tablet Take 1 tablet by mouth once daily at 6am.      citalopram (CELEXA) 10 MG tablet Take 1 tablet by mouth once daily at 6am.      furosemide (LASIX) 20 MG tablet Take 20 mg by mouth once daily. 3 times a week      warfarin (COUMADIN) 2.5 MG tablet Take 1 tablet by mouth daily as needed.      tamsulosin (FLOMAX) 0.4 mg Cap Take 1 capsule by mouth once daily at 6am.         Current Inpatient Medications:    Current Facility-Administered Medications:     0.9%  NaCl infusion (for blood administration), , Intravenous, Q24H PRN, Baldomero Balbuena MD    acetaminophen tablet 650 mg, 650 mg, Oral, Q8H PRN, Bobbi Rooney PA-C     acetaminophen tablet 650 mg, 650 mg, Oral, Q4H PRN, Bobbi Rooney PA-C    allopurinoL tablet 100 mg, 100 mg, Oral, Daily, Shania Benoit MD    atorvastatin tablet 20 mg, 20 mg, Oral, Daily, Shania Benoit MD    ceFAZolin (ANCEF) 1 g in dextrose 5 % in water (D5W) 5 % 50 mL IVPB (MB+), 1 g, Intravenous, Once, Baldomero Balbuena MD    citalopram tablet 10 mg, 10 mg, Oral, Daily, Shania Benoit MD    dextrose 10% bolus 125 mL, 12.5 g, Intravenous, PRN, Baldomero Balbuena MD    dextrose 10% bolus 250 mL, 25 g, Intravenous, PRN, Baldomero Balbuena MD    glucagon (human recombinant) injection 1 mg, 1 mg, Intramuscular, PRN, Bobbi Rooney PA-C    glucose chewable tablet 16 g, 16 g, Oral, PRN, Bobbi Rooney PA-C    glucose chewable tablet 24 g, 24 g, Oral, PRN, Bobbi Rooney PA-C    ondansetron injection 4 mg, 4 mg, Intravenous, Q4H PRN, Bobbi Rooney PA-C    pantoprazole injection 40 mg, 40 mg, Intravenous, BID, Bobbi Rooney PA-C, 40 mg at 05/24/22 0630    sodium chloride 0.9% flush 10 mL, 10 mL, Intravenous, PRN, Baldomero Balbuena MD    sodium chloride 0.9% flush 10 mL, 10 mL, Intravenous, Q12H PRN, Bobbi Rooney PA-C    tamsulosin 24 hr capsule 0.4 mg, 1 capsule, Oral, Daily, Shania Benoit MD           Assessment:     IMPRESSION:  NSTEMI type II, supply-demand ischemia  Syncope  Anemia  ?  GI bleed, dark stools x1 week  VHD/severe AS, awaiting TAVR  Mild CAD  HTN  HLD  Bilateral carotid artery stenosis    PLAN:     Plan:  Agree with transfusion of PRBCs  GI workup in progress-EGD today  Continue home medications  Have patient follow up with Dr. Fragoso in 5-7 days post d/c to reschedule TAVR  Will be available as needed.      MARTA Dasilva  Cardiology  [facility] - Emergency Dept  05/24/2022 1:13 PM

## 2022-05-24 NOTE — ANESTHESIA POSTPROCEDURE EVALUATION
Anesthesia Post Evaluation    Patient: Geo Barrow    Procedure(s) Performed: Procedure(s) (LRB):  EGD (ESOPHAGOGASTRODUODENOSCOPY) (N/A)  EGD, WITH CLOSED BIOPSY    Final Anesthesia Type: general      Patient location during evaluation: GI PACU  Patient participation: Yes- Able to Participate  Level of consciousness: awake and alert  Post-procedure vital signs: reviewed and stable  Pain management: adequate  Airway patency: patent    PONV status at discharge: No PONV  Anesthetic complications: no      Cardiovascular status: blood pressure returned to baseline  Respiratory status: spontaneous ventilation and room air  Hydration status: euvolemic  Follow-up not needed.          Vitals Value Taken Time   /83 05/24/22 1332   Temp 36.5 °C (97.7 °F) 05/24/22 1312   Pulse 96 05/24/22 1332   Resp 17 05/24/22 1332   SpO2 96 % 05/24/22 1332         No case tracking events are documented in the log.      Pain/Julee Score: Julee Score: 10 (5/24/2022  1:32 PM)

## 2022-05-24 NOTE — PROGRESS NOTES
[facility] Hospital Medicine Progress Note        Chief Complaint: Inpatient Follow-up    HPI:   81-year-old  male with significant history of HTN, HLD, PAF on Coumadin, chronic gout, prostate cancer status post radiation, valvular heart disease-aortic stenosis awaiting TAVR.  Patient is scheduled for TAVR tomorrow morning.  Patient presented to the ED on 05/23 with complaints of 2 syncopal episodes yesterday.  Patient reports becoming short of breath and dizzy while he was walking to his vehicle yesterday and he passed out while he was inside his truck.  And 1 more episode today occurred while he was in backed up.  Patient reports he had viral gastroenteritis 1 and half weeks ago with primary complaints of nausea, vomiting, diarrhea.  Symptoms then had resolved, but since this he had couple of episodes of melanotic stools.  Patient's Coumadin has been on hold since 5/17 in preparation for TAVR.  Patient presented to the ED given syncopal episodes.  Upon initial evaluation in the ED patient was borderline hypotensive.  Lab significant for anemia with hemoglobin less than 7.7.  Patient was also noted to have mild renal insufficiency.  Hospitalist team and GI team consulted by the ED physician.  Likely cause of syncopal episode was severe anemia.  Patient also was hypotensive on admit.  Blood transfusion ordered and hypotension improved with the same.  Initiated IV Protonix.  GI planning for endoscopic evaluation.  Patient NPO.  Cardiology also consulted.  Coumadin on hold.  Home meds resumed.  Ultrasound carotid also was ordered to further evaluate syncopal episode.    Interval Hx:   Patient seen at bedside.  No more hypotensive.  Blood pressure is stable.  EGD postpone to today.  He is NPO awaiting the same.  No more overt bleeding.    Objective/physical exam:  General: In no acute distress, afebrile  Chest: Clear to auscultation bilaterally  Heart: S1, S2, no appreciable  murmur  Abdomen: Soft, nontender, BS +  MSK: Warm, no lower extremity edema, no clubbing or cyanosis  Neurologic: Alert and oriented x4, moving all extremities with good strength     VITAL SIGNS: 24 HRS MIN & MAX LAST   Temp  Min: 98.1 °F (36.7 °C)  Max: 98.4 °F (36.9 °C) 98.1 °F (36.7 °C)   BP  Min: 98/58  Max: 124/78 121/71   Pulse  Min: 71  Max: 109  85   Resp  Min: 11  Max: 23 18   SpO2  Min: 95 %  Max: 100 % 97 %       Recent Labs   Lab 05/24/22  0402   WBC 5.3   RBC 2.80*   HGB 9.0*   HCT 27.3*   MCV 97.5*   MCH 32.1*   MCHC 33.0   RDW 14.7      MPV 11.3         Recent Labs   Lab 05/23/22  0955 05/24/22  0402    139   K 3.5 3.4*   CO2 26 25   BUN 34.4* 23.2   CREATININE 1.20* 1.07   CALCIUM 8.7* 8.6*   MG 2.00  --    ALBUMIN 3.3* 3.2*   ALKPHOS 62 61   ALT 41 36   AST 35* 33   BILITOT 0.5 0.7          Microbiology Results (last 7 days)     ** No results found for the last 168 hours. **           Scheduled Med:   allopurinoL  100 mg Oral Daily    atorvastatin  20 mg Oral Daily    ceFAZolin (ANCEF) IVPB  1 g Intravenous Once    citalopram  10 mg Oral Daily    pantoprazole  40 mg Intravenous BID    potassium chloride  40 mEq Oral Once    tamsulosin  1 capsule Oral Daily          Assessment/Plan:      Syncopal episode likely secondary to severe anemia  Acute versus acute on chronic macrocytic anemia-improved status post transfusion on 05/23  Suspected GI bleed/recent melanotic stools  Hypokalemia  Severe aortic stenosis awaiting TAVR, scheduled for 5/24  History of essential HTN  Borderline hypotension on admit secondary to anemia  HLD  History of PAF on Coumadin, on hold since 05/17  Chronic gout  History of prostate cancer status post radiation  Prophylaxis     Likely cause of syncopal episode was severe anemia  Patient's hemoglobin improved to 9 today after 2 units PRBC transfusion yesterday on admit  GI and Cardiology on board  GI planning for EGD today  Await results  Continue IV  Protonix  Potassium replacement ordered-40 mg x 1 dose  Cardiology also evaluated  Patient's Coumadin already on hold since 05/17 in preparation for TAVR  Cardiology recommending to follow-up as outpatient once discharged to reschedule TAVR  Continue home meds-statin, citalopram, tamsulosin, allopurinol  I will also resume home amlodipine/Ace inhibitors since he has no more hypotensive  Echocardiogram was ordered in addition to ultrasound carotid to further evaluate syncopal episode  EF normal  Less than 50% carotid artery stenosis bilaterally  DVT prophylaxis-bilateral SCDs      Shania Benoit MD   05/24/2022

## 2022-05-24 NOTE — TRANSFER OF CARE
Anesthesia Transfer of Care Note    Patient: Geo Barrow    Procedure(s) Performed: Procedure(s) (LRB):  EGD (ESOPHAGOGASTRODUODENOSCOPY) (N/A)  EGD, WITH CLOSED BIOPSY    Patient location: GI    Anesthesia Type: MAC    Transport from OR: Transported from OR on room air with adequate spontaneous ventilation    Post pain: adequate analgesia    Post assessment: no apparent anesthetic complications    Post vital signs: stable    Level of consciousness: awake, alert and oriented    Nausea/Vomiting: no nausea/vomiting    Complications: none    Transfer of care protocol was followed      Last vitals:   Visit Vitals  /78 (BP Location: Right arm, Patient Position: Lying)   Pulse 99   Temp 36.5 °C (97.7 °F) (Skin)   Resp 18   Ht 6' (1.829 m)   Wt 96.2 kg (212 lb)   SpO2 96%   BMI 28.75 kg/m²

## 2022-05-25 VITALS
OXYGEN SATURATION: 97 % | TEMPERATURE: 99 F | DIASTOLIC BLOOD PRESSURE: 74 MMHG | RESPIRATION RATE: 18 BRPM | WEIGHT: 213.88 LBS | SYSTOLIC BLOOD PRESSURE: 116 MMHG | BODY MASS INDEX: 28.97 KG/M2 | HEART RATE: 78 BPM | HEIGHT: 72 IN

## 2022-05-25 LAB
ALBUMIN SERPL-MCNC: 3.2 GM/DL (ref 3.4–4.8)
ALBUMIN/GLOB SERPL: 1.6 RATIO (ref 1.1–2)
ALP SERPL-CCNC: 67 UNIT/L (ref 40–150)
ALT SERPL-CCNC: 45 UNIT/L (ref 0–55)
AST SERPL-CCNC: 40 UNIT/L (ref 5–34)
BASOPHILS # BLD AUTO: 0.04 X10(3)/MCL (ref 0–0.2)
BASOPHILS NFR BLD AUTO: 0.7 %
BILIRUBIN DIRECT+TOT PNL SERPL-MCNC: 0.9 MG/DL
BUN SERPL-MCNC: 12.8 MG/DL (ref 8.4–25.7)
CALCIUM SERPL-MCNC: 8.4 MG/DL (ref 8.8–10)
CHLORIDE SERPL-SCNC: 108 MMOL/L (ref 98–107)
CO2 SERPL-SCNC: 27 MMOL/L (ref 23–31)
CREAT SERPL-MCNC: 1.13 MG/DL (ref 0.73–1.18)
EOSINOPHIL # BLD AUTO: 0.32 X10(3)/MCL (ref 0–0.9)
EOSINOPHIL NFR BLD AUTO: 5.8 %
ERYTHROCYTE [DISTWIDTH] IN BLOOD BY AUTOMATED COUNT: 14.6 % (ref 11.5–17)
GLOBULIN SER-MCNC: 2 GM/DL (ref 2.4–3.5)
GLUCOSE SERPL-MCNC: 87 MG/DL (ref 82–115)
HCT VFR BLD AUTO: 28.5 % (ref 42–52)
HGB BLD-MCNC: 9.4 GM/DL (ref 14–18)
IMM GRANULOCYTES # BLD AUTO: 0.06 X10(3)/MCL (ref 0–0.02)
IMM GRANULOCYTES NFR BLD AUTO: 1.1 % (ref 0–0.43)
LYMPHOCYTES # BLD AUTO: 0.85 X10(3)/MCL (ref 0.6–4.6)
LYMPHOCYTES NFR BLD AUTO: 15.3 %
MCH RBC QN AUTO: 31.9 PG (ref 27–31)
MCHC RBC AUTO-ENTMCNC: 33 MG/DL (ref 33–36)
MCV RBC AUTO: 96.6 FL (ref 80–94)
MONOCYTES # BLD AUTO: 0.51 X10(3)/MCL (ref 0.1–1.3)
MONOCYTES NFR BLD AUTO: 9.2 %
NEUTROPHILS # BLD AUTO: 3.8 X10(3)/MCL (ref 2.1–9.2)
NEUTROPHILS NFR BLD AUTO: 67.9 %
NRBC BLD AUTO-RTO: 0 %
PLATELET # BLD AUTO: 182 X10(3)/MCL (ref 130–400)
PMV BLD AUTO: 11.2 FL (ref 9.4–12.4)
POTASSIUM SERPL-SCNC: 3.8 MMOL/L (ref 3.5–5.1)
PROT SERPL-MCNC: 5.2 GM/DL (ref 5.8–7.6)
RBC # BLD AUTO: 2.95 X10(6)/MCL (ref 4.7–6.1)
SODIUM SERPL-SCNC: 141 MMOL/L (ref 136–145)
WBC # SPEC AUTO: 5.6 X10(3)/MCL (ref 4.5–11.5)

## 2022-05-25 PROCEDURE — 36415 COLL VENOUS BLD VENIPUNCTURE: CPT | Performed by: INTERNAL MEDICINE

## 2022-05-25 PROCEDURE — 80053 COMPREHEN METABOLIC PANEL: CPT | Performed by: INTERNAL MEDICINE

## 2022-05-25 PROCEDURE — 25000003 PHARM REV CODE 250: Performed by: INTERNAL MEDICINE

## 2022-05-25 PROCEDURE — 85025 COMPLETE CBC W/AUTO DIFF WBC: CPT | Performed by: INTERNAL MEDICINE

## 2022-05-25 RX ORDER — PANTOPRAZOLE SODIUM 40 MG/1
40 TABLET, DELAYED RELEASE ORAL
Status: DISCONTINUED | OUTPATIENT
Start: 2022-05-25 | End: 2022-05-25 | Stop reason: HOSPADM

## 2022-05-25 RX ORDER — PANTOPRAZOLE SODIUM 40 MG/1
40 TABLET, DELAYED RELEASE ORAL 2 TIMES DAILY
Qty: 60 TABLET | Refills: 5 | Status: ON HOLD | OUTPATIENT
Start: 2022-05-25 | End: 2022-09-10 | Stop reason: SDUPTHER

## 2022-05-25 RX ADMIN — ALLOPURINOL 100 MG: 100 TABLET ORAL at 05:05

## 2022-05-25 RX ADMIN — LISINOPRIL 10 MG: 10 TABLET ORAL at 08:05

## 2022-05-25 RX ADMIN — TAMSULOSIN HYDROCHLORIDE 0.4 MG: 0.4 CAPSULE ORAL at 05:05

## 2022-05-25 RX ADMIN — ATORVASTATIN CALCIUM 20 MG: 10 TABLET, FILM COATED ORAL at 05:05

## 2022-05-25 RX ADMIN — AMLODIPINE BESYLATE 10 MG: 5 TABLET ORAL at 05:05

## 2022-05-25 RX ADMIN — CITALOPRAM HYDROBROMIDE 10 MG: 10 TABLET ORAL at 05:05

## 2022-05-25 NOTE — PROVATION PATIENT INSTRUCTIONS
Discharge Summary/Instructions after an Endoscopic Procedure  Patient Name: Geo Barrow  Patient MRN: [id number]  Patient YOB: 1940  Tuesday, May 24, 2022  Lino Rider MD  Dear patient,  As a result of recent federal legislation (The Federal Cures Act), you may   receive lab or pathology results from your procedure in your [facility]   account before your physician is able to contact you. Your physician or   their representative will relay the results to you with their   recommendations at their soonest availability.  Thank you,  RESTRICTIONS:  During your procedure today, you received medications for sedation.  These   medications may affect your judgment, balance and coordination.  Therefore,   for 24 hours, you have the following restrictions:   - DO NOT drive a car, operate machinery, make legal/financial decisions,   sign important papers or drink alcohol.    ACTIVITY:  Today: no heavy lifting, straining or running due to procedural   sedation/anesthesia.  The following day: return to full activity including work.  DIET:  Eat and drink normally unless instructed otherwise.     TREATMENT FOR COMMON SIDE EFFECTS:  - Mild abdominal pain, nausea, belching, bloating or excessive gas:  rest,   eat lightly and use a heating pad.  - Sore Throat: treat with throat lozenges and/or gargle with warm salt   water.  - Because air was used during the procedure, expelling large amounts of air   from your rectum or belching is normal.  - If a bowel prep was taken, you may not have a bowel movement for 1-3 days.    This is normal.  SYMPTOMS TO WATCH FOR AND REPORT TO YOUR PHYSICIAN:  1. Abdominal pain or bloating, other than gas cramps.  2. Chest pain.  3. Back pain.  4. Signs of infection such as: chills or fever occurring within 24 hours   after the procedure.  5. Rectal bleeding, which would show as bright red, maroon, or black stools.   (A tablespoon of blood from the rectum is not serious, especially if    hemorrhoids are present.)  6. Vomiting.  7. Weakness or dizziness.  GO DIRECTLY TO THE NEAREST EMERGENCY ROOM IF YOU HAVE ANY OF THE FOLLOWING:      Difficulty breathing              Chills and/or fever over 101 F   Persistent vomiting and/or vomiting blood   Severe abdominal pain   Severe chest pain   Black, tarry stools   Bleeding- more than one tablespoon   Any other symptom or condition that you feel may need urgent attention  Your doctor recommends these additional instructions:  If any biopsies were taken, your doctors clinic will contact you in 1 to 2   weeks with any results.  - [facility] patient to [facility] for [facility] care.   - Clear liquid diet today.   - Follow an antireflux regimen indefinitely.   - Use Nexium (esomeprazole) 40 mg PO BID for 6 months.   - Await pathology results.   - Repeat upper endoscopy in 4 months to check healing.   - Perform a colonoscopy at appointment to be scheduled, given severity of   aortic stenosis with no current active GI bleeding since admission, it   would be reasonable to have TAVR done and outpatient colonoscopy done at a   later date once he has recovered from surgery.  Also, given his severe   aortic stenosis and melena history, small bowel AVMs are a consideration,   so VCE is also a future consideration pending his overall clinical course.     - The findings and recommendations were discussed with the patient.  For questions, problems or results please call your physician - Lino Rider MD at Work:  [phone].  [facility], EMERGENCY ROOM PHONE NUMBER: [phone]  IF A COMPLICATION OR EMERGENCY SITUATION ARISES AND YOU ARE UNABLE TO REACH   YOUR PHYSICIAN - GO DIRECTLY TO THE EMERGENCY ROOM.  MD Lino Roe MD  5/24/2022 1:22:48 PM  This report has been verified and signed electronically.  Dear patient,  As a result of recent federal legislation (The Federal Cures Act), you may   receive lab or pathology results from  your procedure in your [facility]   account before your physician is able to contact you. Your physician or   their representative will relay the results to you with their   recommendations at their soonest availability.  Thank you,  PROVATION

## 2022-05-25 NOTE — DISCHARGE SUMMARY
[facility] Hospital Medicine Discharge Summary    Admit Date: 5/23/2022  Discharge Date and Time: 5/25/202212:01 PM  Discharging Physician: Shania Benoit MD.  Primary Care Physician: Deidra Briggs MD    Discharge Diagnoses:  Syncopal episode likely secondary to severe anemia  Acute versus acute on chronic macrocytic anemia-improved status post transfusion on 05/23  Suspected GI bleed/recent melanotic stools  Hypokalemia  Severe aortic stenosis awaiting TAVR, scheduled for 5/24  History of essential HTN  Borderline hypotension on admit secondary to anemia  HLD  History of PAF on Coumadin, on hold since 05/17  Chronic gout  History of prostate cancer status post radiation    Hospital Course:   81-year-old  male with significant history of HTN, HLD, PAF on Coumadin, chronic gout, prostate cancer status post radiation, valvular heart disease-aortic stenosis awaiting TAVR.  Patient is scheduled for TAVR tomorrow morning.  Patient presented to the ED on 05/23 with complaints of 2 syncopal episodes yesterday.  Patient reports becoming short of breath and dizzy while he was walking to his vehicle yesterday and he passed out while he was inside his truck.  And 1 more episode today occurred while he was in backed up.  Patient reports he had viral gastroenteritis 1 and half weeks ago with primary complaints of nausea, vomiting, diarrhea.  Symptoms then had resolved, but since this he had couple of episodes of melanotic stools.  Patient's Coumadin has been on hold since 5/17 in preparation for TAVR.  Patient presented to the ED given syncopal episodes.  Upon initial evaluation in the ED patient was borderline hypotensive.  Lab significant for anemia with hemoglobin less than 7.7.  Patient was also noted to have mild renal insufficiency.  Hospitalist team and GI team consulted by the ED physician.  Likely cause of syncopal episode was severe anemia.  Patient also was hypotensive on  admit.  Blood transfusion ordered and hypotension improved with the same.  Initiated IV Protonix.  GI planning for endoscopic evaluation.  Patient NPO.  Cardiology also consulted.  Coumadin on hold.  Home meds resumed.  Ultrasound carotid also was ordered to further evaluate syncopal episode.  Most likely cause of syncope was severe anemia.  Hemoglobin improved to 9 as of 5/24 after receiving 2 units PRBC on 05/23.  GI and Cardiology continued to follow the patient with on IV Protonix.  Patient's Coumadin had been on hold since 05/17 in preparation for TAVR.  Home meds continued as appropriate.  Echocardiogram and ultrasound carotid which was ordered as part of syncopal workup-non impressive.  No more hypotension and therefore home amlodipine/ACE-inhibitor resumed.  EGD revealed reflux esophagitis with no bleeding and gastric erosions.  GI recommended to continue PPI b.i.d. for 6 months.  GI cleared the patient for DC.  Patient re-evaluated 5/25.  Hemoglobin remaining stable.  Hemodynamics also stable.  Cardiology also cleared the patient.  They recommended to start Eliquis instead of Coumadin so that it will be easier to hold for TAVR.  Given that he was hemodynamically stable with no more overt bleeding it was decided to discharge the patient home.  Discharged on ppi/Eliquis.  Follow-up with Cardiology to decide timing for TAVR.  Follow-up with GI.  Follow-up with PCP.  Treatment plans discussed with the patient and family and they voiced understanding to everything explained.      Vitals:  VITAL SIGNS: 24 HRS MIN & MAX LAST   Temp  Min: 97.7 °F (36.5 °C)  Max: 98.5 °F (36.9 °C) 98.5 °F (36.9 °C)   BP  Min: 108/78  Max: 142/69 126/76   Pulse  Min: 76  Max: 99  76   Resp  Min: 17  Max: 20 18   SpO2  Min: 95 %  Max: 99 % 97 %       Physical Exam:  General appearance:  No acute distress  HENT: Atraumatic   Lungs: Clear to auscultation bilaterally.   Heart: RRR,No edema  Abdomen: Soft, non tender   Extremities:  warm  Neuro:  Awake, alert, oriented x4  Psych/mental status: Appropriate mood and affect.     Procedures Performed: No admission procedures for hospital encounter.     Significant Diagnostic Studies: See Full reports for all details    Recent Labs   Lab 05/23/22  0955 05/23/22 2122 05/24/22 0402 05/25/22  0329   WBC 6.2  --  5.3 5.6   RBC 2.41*  --  2.80* 2.95*   HGB 7.7* 9.0* 9.0* 9.4*   HCT 23.7* 26.8* 27.3* 28.5*   MCV 98.3*  --  97.5* 96.6*   MCH 32.0*  --  32.1* 31.9*   MCHC 32.5*  --  33.0 33.0   RDW 13.6  --  14.7 14.6     --  160 182   MPV 11.1  --  11.3 11.2       Recent Labs   Lab 05/23/22 0955 05/24/22 0402 05/25/22 0329    139 141   K 3.5 3.4* 3.8   CO2 26 25 27   BUN 34.4* 23.2 12.8   CREATININE 1.20* 1.07 1.13   CALCIUM 8.7* 8.6* 8.4*   MG 2.00  --   --    ALBUMIN 3.3* 3.2* 3.2*   ALKPHOS 62 61 67   ALT 41 36 45   AST 35* 33 40*   BILITOT 0.5 0.7 0.9        Microbiology Results (last 7 days)     ** No results found for the last 168 hours. **           CV Ultrasound Bilateral Doppler Carotid  The right internal carotid artery demonstrated less than 50% stenosis.  The left internal carotid artery demonstrated less than 50% stenosis.  The bilateral vertebral arteries were patent with antegrade flow.  Echo  · Mild concentric hypertrophy and low normal systolic function.  · The estimated ejection fraction is 50%.  · There is severe aortic valve stenosis. Aortic valve area is 0.6 cm2;   peak velocity is 4.94 m/s; mean gradient is 62 mmHg.  · Moderate-to-severe mitral regurgitation.  · Mild tricuspid regurgitation. The estimated PA systolic pressure is 38   mmHg.  · Aortic valve area is 0.45 cm2; peak velocity is 4.94 m/s; mean gradient   is 62 mmHg.     X-Ray Chest AP Portable  Narrative: EXAMINATION:  XR CHEST AP PORTABLE    CLINICAL HISTORY:  syncope;    TECHNIQUE:  Single view of the chest    COMPARISON:  No prior imaging available for comparison.    FINDINGS:  No focal opacification,  pleural effusion, or pneumothorax.    The cardiomediastinal silhouette is within normal limits.    No acute osseous abnormality.  Impression: No acute cardiopulmonary process.    Electronically signed by: Sandeep Palencia  Date:    05/23/2022  Time:    09:55         Medication List      START taking these medications    apixaban 5 mg Tab  Commonly known as: ELIQUIS  Take 1 tablet (5 mg total) by mouth 2 (two) times daily.     pantoprazole 40 MG tablet  Commonly known as: PROTONIX  Take 1 tablet (40 mg total) by mouth 2 (two) times daily.        CONTINUE taking these medications    allopurinoL 100 MG tablet  Commonly known as: ZYLOPRIM     amLODIPine 10 MG tablet  Commonly known as: NORVASC     atorvastatin 20 MG tablet  Commonly known as: LIPITOR     benazepriL 20 MG tablet  Commonly known as: LOTENSIN     citalopram 10 MG tablet  Commonly known as: CeleXA     furosemide 20 MG tablet  Commonly known as: LASIX     tamsulosin 0.4 mg Cap  Commonly known as: FLOMAX        STOP taking these medications    warfarin 2.5 MG tablet  Commonly known as: COUMADIN           Where to Get Your Medications      These medications were sent to [facility] - [address]  [address] [place] [address] [facility] [address]    Phone: [phone]   · apixaban 5 mg Tab  · pantoprazole 40 MG tablet          Explained in detail to the patient about the discharge plan, medications, and follow-up visits. Pt understands and agrees with the treatment plan  Discharge Disposition:     Discharged Condition: stable  Diet-   Dietary Orders (From admission, onward)     Start     Ordered    05/25/22 1013  Diet Adult Regular  Diet effective now         05/25/22 1013               Medications Per DC med rec  Activities as tolerated   Follow-up Information     Reese Fragoso MD. Go in 1 week(s).    Specialties: Cardiovascular Disease, Cardiology  Why: Follow up appointment on june 9 at 3:20  pm  Contact information:  [address]  [facility] [address]  [phone]             Deidra Briggs MD Follow up in 1 week(s).    Specialty: Family Medicine  Contact information:  [address]  [place] [address]  [phone]             CBC in 2 days Follow up.           Lino Vega MD Follow up in 1 week(s).    Specialty: Gastroenterology  Contact information:  [address] [facility] [address]  [facility] [address]  [phone]                       For further questions contact hospitalist office    Discharge time 33 minutes    For worsening symptoms, chest pain, shortness of breath, increased abdominal pain, high grade fever, stroke or stroke like symptoms, immediately go to the nearest Emergency Room or call 911 as soon as possible.      Shnaia Arboleda M.D, on 5/25/2022. at 12:01 PM.

## 2022-05-26 ENCOUNTER — PATIENT OUTREACH (OUTPATIENT)
Dept: ADMINISTRATIVE | Facility: CLINIC | Age: 82
End: 2022-05-26
Payer: MEDICARE

## 2022-05-26 NOTE — PROGRESS NOTES
C3 nurse spoke with pt.'s wife for a TCC post hospital discharge follow up call. The patient has a scheduled [facility] appointment with Deidra Briggs MD on 06/01/2022 @ 230 pm. Appointment with Dr. Fragoso 06/09/2022 @ 320 pm. Awaiting Dr. Lino Vega's office to call with an appointment date.

## 2022-05-31 LAB
DHEA SERPL-MCNC: NORMAL
ESTROGEN SERPL-MCNC: NORMAL PG/ML
INSULIN SERPL-ACNC: NORMAL U[IU]/ML
LAB AP CLINICAL INFORMATION: NORMAL
LAB AP GROSS DESCRIPTION: NORMAL
LAB AP REPORT FOOTNOTES: NORMAL
T3RU NFR SERPL: NORMAL %

## 2022-06-06 ENCOUNTER — HOSPITAL ENCOUNTER (OUTPATIENT)
Facility: HOSPITAL | Age: 82
Discharge: HOME | End: 2022-06-08
Attending: EMERGENCY MEDICINE | Admitting: INTERNAL MEDICINE
Payer: MEDICARE

## 2022-06-06 DIAGNOSIS — I35.0 AORTIC VALVE STENOSIS, ETIOLOGY OF CARDIAC VALVE DISEASE UNSPECIFIED: ICD-10-CM

## 2022-06-06 DIAGNOSIS — I35.0 AORTIC STENOSIS: ICD-10-CM

## 2022-06-06 DIAGNOSIS — R53.1 GENERAL WEAKNESS: ICD-10-CM

## 2022-06-06 DIAGNOSIS — R55 SYNCOPE, UNSPECIFIED SYNCOPE TYPE: Primary | ICD-10-CM

## 2022-06-06 DIAGNOSIS — I48.91 ATRIAL FIBRILLATION WITH CONTROLLED VENTRICULAR RATE: ICD-10-CM

## 2022-06-06 DIAGNOSIS — R29.6 FREQUENT FALLS: ICD-10-CM

## 2022-06-06 DIAGNOSIS — R53.1 WEAKNESS: ICD-10-CM

## 2022-06-06 LAB
ALBUMIN SERPL-MCNC: 3.9 GM/DL (ref 3.4–4.8)
ALBUMIN/GLOB SERPL: 1.5 RATIO (ref 1.1–2)
ALP SERPL-CCNC: 94 UNIT/L (ref 40–150)
ALT SERPL-CCNC: 33 UNIT/L (ref 0–55)
APPEARANCE UR: CLEAR
APTT PPP: 43.2 SECONDS (ref 23.2–33.7)
AST SERPL-CCNC: 31 UNIT/L (ref 5–34)
BACTERIA #/AREA URNS AUTO: NORMAL /HPF
BASOPHILS # BLD AUTO: 0.05 X10(3)/MCL (ref 0–0.2)
BASOPHILS NFR BLD AUTO: 1 %
BILIRUB UR QL STRIP.AUTO: NEGATIVE MG/DL
BILIRUBIN DIRECT+TOT PNL SERPL-MCNC: 0.7 MG/DL
BUN SERPL-MCNC: 12.6 MG/DL (ref 8.4–25.7)
CALCIUM SERPL-MCNC: 9.4 MG/DL (ref 8.8–10)
CHLORIDE SERPL-SCNC: 106 MMOL/L (ref 98–107)
CO2 SERPL-SCNC: 27 MMOL/L (ref 23–31)
COLOR UR AUTO: ABNORMAL
CREAT SERPL-MCNC: 1.46 MG/DL (ref 0.73–1.18)
EOSINOPHIL # BLD AUTO: 0.32 X10(3)/MCL (ref 0–0.9)
EOSINOPHIL NFR BLD AUTO: 6.3 %
ERYTHROCYTE [DISTWIDTH] IN BLOOD BY AUTOMATED COUNT: 13.2 % (ref 11.5–17)
GLOBULIN SER-MCNC: 2.6 GM/DL (ref 2.4–3.5)
GLUCOSE SERPL-MCNC: 104 MG/DL (ref 82–115)
GLUCOSE UR QL STRIP.AUTO: NEGATIVE MG/DL
HCT VFR BLD AUTO: 31.4 % (ref 42–52)
HGB BLD-MCNC: 10.3 GM/DL (ref 14–18)
IMM GRANULOCYTES # BLD AUTO: 0.02 X10(3)/MCL (ref 0–0.02)
IMM GRANULOCYTES NFR BLD AUTO: 0.4 % (ref 0–0.43)
INR BLD: 1.71 (ref 0–1.3)
KETONES UR QL STRIP.AUTO: NEGATIVE MG/DL
LEUKOCYTE ESTERASE UR QL STRIP.AUTO: NEGATIVE UNIT/L
LYMPHOCYTES # BLD AUTO: 0.73 X10(3)/MCL (ref 0.6–4.6)
LYMPHOCYTES NFR BLD AUTO: 14.4 %
MCH RBC QN AUTO: 30.7 PG (ref 27–31)
MCHC RBC AUTO-ENTMCNC: 32.8 MG/DL (ref 33–36)
MCV RBC AUTO: 93.5 FL (ref 80–94)
MONOCYTES # BLD AUTO: 0.56 X10(3)/MCL (ref 0.1–1.3)
MONOCYTES NFR BLD AUTO: 11 %
NEUTROPHILS # BLD AUTO: 3.4 X10(3)/MCL (ref 2.1–9.2)
NEUTROPHILS NFR BLD AUTO: 66.9 %
NITRITE UR QL STRIP.AUTO: NEGATIVE
NRBC BLD AUTO-RTO: 0 %
PH UR STRIP.AUTO: 7.5 [PH]
PLATELET # BLD AUTO: 219 X10(3)/MCL (ref 130–400)
PMV BLD AUTO: 10.3 FL (ref 9.4–12.4)
POTASSIUM SERPL-SCNC: 4 MMOL/L (ref 3.5–5.1)
PROT SERPL-MCNC: 6.5 GM/DL (ref 5.8–7.6)
PROT UR QL STRIP.AUTO: ABNORMAL MG/DL
PROTHROMBIN TIME: 19.8 SECONDS (ref 12.5–14.5)
RBC # BLD AUTO: 3.36 X10(6)/MCL (ref 4.7–6.1)
RBC #/AREA URNS AUTO: <5 /HPF
RBC UR QL AUTO: NEGATIVE UNIT/L
SARS-COV-2 RDRP RESP QL NAA+PROBE: NEGATIVE
SODIUM SERPL-SCNC: 141 MMOL/L (ref 136–145)
SP GR UR STRIP.AUTO: 1.01 (ref 1–1.03)
SQUAMOUS #/AREA URNS AUTO: <4 /LPF
TROPONIN I SERPL-MCNC: 0.01 NG/ML (ref 0–0.04)
UROBILINOGEN UR STRIP-ACNC: 1 MG/DL
WBC # SPEC AUTO: 5.1 X10(3)/MCL (ref 4.5–11.5)
WBC #/AREA URNS AUTO: <5 /HPF

## 2022-06-06 PROCEDURE — 99285 EMERGENCY DEPT VISIT HI MDM: CPT | Mod: 25

## 2022-06-06 PROCEDURE — 81001 URINALYSIS AUTO W/SCOPE: CPT | Performed by: PHYSICIAN ASSISTANT

## 2022-06-06 PROCEDURE — 93005 ELECTROCARDIOGRAM TRACING: CPT

## 2022-06-06 PROCEDURE — G0378 HOSPITAL OBSERVATION PER HR: HCPCS

## 2022-06-06 PROCEDURE — 36415 COLL VENOUS BLD VENIPUNCTURE: CPT | Performed by: PHYSICIAN ASSISTANT

## 2022-06-06 PROCEDURE — 25000003 PHARM REV CODE 250: Performed by: INTERNAL MEDICINE

## 2022-06-06 PROCEDURE — 85610 PROTHROMBIN TIME: CPT | Performed by: PHYSICIAN ASSISTANT

## 2022-06-06 PROCEDURE — 85730 THROMBOPLASTIN TIME PARTIAL: CPT | Performed by: PHYSICIAN ASSISTANT

## 2022-06-06 PROCEDURE — 85025 COMPLETE CBC W/AUTO DIFF WBC: CPT | Performed by: PHYSICIAN ASSISTANT

## 2022-06-06 PROCEDURE — 80053 COMPREHEN METABOLIC PANEL: CPT | Performed by: PHYSICIAN ASSISTANT

## 2022-06-06 PROCEDURE — 87635 SARS-COV-2 COVID-19 AMP PRB: CPT | Performed by: EMERGENCY MEDICINE

## 2022-06-06 PROCEDURE — 84484 ASSAY OF TROPONIN QUANT: CPT | Performed by: PHYSICIAN ASSISTANT

## 2022-06-06 RX ORDER — CHOLECALCIFEROL (VITAMIN D3) 25 MCG
1000 TABLET ORAL DAILY
COMMUNITY

## 2022-06-06 RX ORDER — FINASTERIDE 5 MG/1
5 TABLET, FILM COATED ORAL DAILY
Status: DISCONTINUED | OUTPATIENT
Start: 2022-06-07 | End: 2022-06-08 | Stop reason: HOSPADM

## 2022-06-06 RX ORDER — PANTOPRAZOLE SODIUM 40 MG/1
40 TABLET, DELAYED RELEASE ORAL 2 TIMES DAILY
Status: DISCONTINUED | OUTPATIENT
Start: 2022-06-06 | End: 2022-06-08 | Stop reason: HOSPADM

## 2022-06-06 RX ORDER — ONDANSETRON 2 MG/ML
4 INJECTION INTRAMUSCULAR; INTRAVENOUS EVERY 8 HOURS PRN
Status: DISCONTINUED | OUTPATIENT
Start: 2022-06-06 | End: 2022-06-08 | Stop reason: HOSPADM

## 2022-06-06 RX ORDER — SODIUM CHLORIDE 0.9 % (FLUSH) 0.9 %
10 SYRINGE (ML) INJECTION
Status: DISCONTINUED | OUTPATIENT
Start: 2022-06-06 | End: 2022-06-08 | Stop reason: HOSPADM

## 2022-06-06 RX ORDER — ALLOPURINOL 100 MG/1
100 TABLET ORAL DAILY
Status: DISCONTINUED | OUTPATIENT
Start: 2022-06-07 | End: 2022-06-08 | Stop reason: HOSPADM

## 2022-06-06 RX ORDER — LISINOPRIL 10 MG/1
10 TABLET ORAL DAILY
Status: DISCONTINUED | OUTPATIENT
Start: 2022-06-07 | End: 2022-06-07

## 2022-06-06 RX ORDER — CITALOPRAM 10 MG/1
10 TABLET ORAL DAILY
Status: DISCONTINUED | OUTPATIENT
Start: 2022-06-07 | End: 2022-06-08 | Stop reason: HOSPADM

## 2022-06-06 RX ORDER — AMLODIPINE BESYLATE 5 MG/1
5 TABLET ORAL DAILY
Status: DISCONTINUED | OUTPATIENT
Start: 2022-06-07 | End: 2022-06-07

## 2022-06-06 RX ORDER — TALC
6 POWDER (GRAM) TOPICAL NIGHTLY PRN
Status: DISCONTINUED | OUTPATIENT
Start: 2022-06-06 | End: 2022-06-08 | Stop reason: HOSPADM

## 2022-06-06 RX ORDER — ATORVASTATIN CALCIUM 10 MG/1
20 TABLET, FILM COATED ORAL DAILY
Status: DISCONTINUED | OUTPATIENT
Start: 2022-06-07 | End: 2022-06-08 | Stop reason: HOSPADM

## 2022-06-06 RX ADMIN — PANTOPRAZOLE SODIUM 40 MG: 40 TABLET, DELAYED RELEASE ORAL at 10:06

## 2022-06-06 RX ADMIN — APIXABAN 5 MG: 5 TABLET, FILM COATED ORAL at 10:06

## 2022-06-06 NOTE — H&P
[facility] Hospital Medicine History & Physical Examination       Patient Name: Geo Barrow  MRN: [id number]  Patient Class: OP- Observation   Admission Date: 6/6/2022   Admitting Physician: Dr. Emily Mcarthur  Length of Stay: 0  Attending Physician: ARNIE Bobby  Primary Care Provider: Deidra Briggs MD  Face-to-Face encounter date: 06/06/2022  Code Status: Full  Chief Complaint: Weakness and Loss of Consciousness (Pt presents c/o syncopal episodes/weakness. Onset Friday/ seen at [facility]/hit head/on thinners/ct neg.  /  PMH afib/eliquis. )        Patient information was obtained from patient, patient's family, past medical records and ER records.     HISTORY OF PRESENT ILLNESS:   The patient is an 81-year-old  male who has a past medical history of severe aortic stenosis-requiring a TAVR, atrial fibrillation, hyperlipidemia, hypertension, mitral valve stenosis who presented to the ED today with increasing frequency of syncopal episodes at home- has fallen recently and hit his head, however CT scan on Friday at [facility] ED was negative; pt not having any alterations in mental status; previously on warfarin recently placed on Eliquis.Developed a GI bleed over the last couple of weeks and his TAVR procedure had to be rescheduled. Currently TAVR is re-scheduled for next Tuesday. Patient also reports having a stomach virus a few weeks ago and was dehydrated at that time. Daughter is present at the bedside.    PAST MEDICAL HISTORY:     Past Medical History:   Diagnosis Date    Aortic valve stenosis     Atrial fibrillation     Hyperlipidemia     Hypertension     Stenosis of aortic and mitral valves    Gout  GERD  BPH  Hx of prostate cancer  Vitamin D deficiency    PAST SURGICAL HISTORY:     Past Surgical History:   Procedure Laterality Date    ESOPHAGOGASTRODUODENOSCOPY N/A 5/24/2022    Procedure: EGD (ESOPHAGOGASTRODUODENOSCOPY);  Surgeon: Lino Rider MD;  Location:  [facility] ENDOSCOPY;  Service: Gastroenterology;  Laterality: N/A;   inguinal hernia repair  Prostate biopsy  Cataract sx both eyes    ALLERGIES:   Patient has no known allergies.    FAMILY HISTORY:   Reviewed and negative    SOCIAL HISTORY:     Social History     Tobacco Use    Smoking status: Former Smoker    Smokeless tobacco: Never Used   Substance Use Topics    Alcohol use: Not on file    Lives at home; occ. ETOH use; previously smoked many years ago; no illicit drug use  HOME MEDICATIONS:     Prior to Admission medications    Medication Sig Start Date End Date Taking? Authorizing Provider   allopurinoL (ZYLOPRIM) 100 MG tablet Take 1 tablet by mouth once daily at 6am. 5/10/22  Yes Historical Provider   amLODIPine (NORVASC) 10 MG tablet Take 1 tablet by mouth once daily at 6am. 5/10/22  Yes Historical Provider   apixaban (ELIQUIS) 5 mg Tab Take 1 tablet (5 mg total) by mouth 2 (two) times daily. 5/25/22 6/24/22 Yes Shania Benoit MD   atorvastatin (LIPITOR) 20 MG tablet Take 1 tablet by mouth once daily at 6am. 4/7/22  Yes Historical Provider   benazepriL (LOTENSIN) 20 MG tablet Take 1 tablet by mouth once daily at 6am. 5/10/22  Yes Historical Provider   citalopram (CELEXA) 10 MG tablet Take 1 tablet by mouth once daily at 6am. 5/10/22  Yes Historical Provider   pantoprazole (PROTONIX) 40 MG tablet Take 1 tablet (40 mg total) by mouth 2 (two) times daily. 5/25/22 5/25/23 Yes Shania Benoit MD   tamsulosin (FLOMAX) 0.4 mg Cap Take 1 capsule by mouth once daily at 6am. 4/25/22  Yes Historical Provider   furosemide (LASIX) 20 MG tablet Take 20 mg by mouth once daily. 3 times a week    Historical Provider   warfarin (COUMADIN) 2.5 MG tablet Take 1 tablet by mouth daily as needed. 5/10/22 5/25/22  Historical Provider       REVIEW OF SYSTEMS:   Except as documented, all other systems reviewed and negative     PHYSICAL EXAM:     VITAL SIGNS: 24 HRS MIN & MAX LAST   Temp  Min: 98.4 °F (36.9 °C)  Max: 98.4 °F (36.9  °C) 98.4 °F (36.9 °C)   BP  Min: 103/82  Max: 113/69 112/82   Pulse  Min: 74  Max: 119  90   Resp  Min: 14  Max: 18 16   SpO2  Min: 96 %  Max: 98 % 96 %       General appearance:  Elderly,  male lying on ED stretcher; in no apparent distress.  HENT: normocephalic, atraumatic, Moist mucous membranes pink & moist  Eyes: Normal extraocular movements.   Neck: Supple, no JVD   Lungs: Clear to auscultation bilaterally. No wheezing present.   Heart:  Regular irregular rate and rhythm, S1-S2, grade 4/6 systolic murmur heard over aortic region, atrial fibrillation with CVR on monitor   Abdomen: Soft, non-distended, non-tender. No rebound tenderness/guarding. Bowel sounds are normal.   Extremities: 2+ pitting edema bilateral lower extremities, no clubbing or cyanosis  Skin: No rashes or bruises  Neuro: Awake, alert, oriented x 3; GUERRERO's equally spont & to command; speech is clear, no facial asymmetry;Muscle strength 5/5 in all 4 extremities  Psych/mental status: Appropriate mood and affect. Responds appropriately to questions.     LABS AND IMAGING:     Recent Labs   Lab 06/06/22  1031   WBC 5.1   RBC 3.36*   HGB 10.3*   HCT 31.4*   MCV 93.5   MCH 30.7   MCHC 32.8*   RDW 13.2      MPV 10.3       Recent Labs   Lab 06/06/22  1031      K 4.0   CO2 27   BUN 12.6   CREATININE 1.46*   CALCIUM 9.4   ALBUMIN 3.9   ALKPHOS 94   ALT 33   AST 31   BILITOT 0.7       Microbiology Results (last 7 days)     ** No results found for the last 168 hours. **           X-Ray Chest 1 View  Narrative: EXAMINATION:  XR CHEST 1 VIEW    CLINICAL HISTORY:  Weakness    TECHNIQUE:  Frontal view of the chest.    COMPARISON:  Chest radiographs 05/23/2022.    FINDINGS:  The cardiomediastinal silhouette is normal in size and contour. Pulmonary vascularity is within normal limits. The lungs are well-inflated and are without focal consolidation, pleural effusion, or pneumothorax.    The imaged osseous structures and soft tissues are without  acute abnormality.  Impression: No acute cardiopulmonary process.    Electronically signed by: Chidi Reyes  Date:    06/06/2022  Time:    10:24        ASSESSMENT & PLAN:     Frequent falls secondary to dizziness, weakness  -cont. Telemetry  -cont. Pulse oximetry    Severe aortic stenosis- needs TAVR ([facility] managing)  -TAVR re-scheduled for next Tuesday  -Follow recs per cardiology    Hypertension / Hyperlipidemia  -resume home meds if appropriate    Anemia of chronic disease (recent GI bleed) H/H stable at 10.3 / 31.4    IVVD likely due to recent viral gastroenteritis  -gentle rehydration        VTE Prophylaxis: will be placed on Heparin/Lovenox/ Xarelto/ SCD for DVT prophylaxis and will be advised to be as mobile as possible and sit in a chair as tolerated    Patient condition:  Stable/Fair/Gaurded/ Serious/ Critical    __________________________________________________________________________  INPATIENT LIST OF MEDICATIONS     Scheduled Meds:  Continuous Infusions:  PRN Meds:.      Mona DUGAN, NP have reviewed and discussed the case with Dr. Mcarthur.   Please see the following addendum for further assessment and plan from there attending MD.    06/06/2022    ________________________________________________________________________________    MD Addendum:  Dr. RITCHIE ---assumed care of this patient today at ---am/pm  For the patient encounter, I performed the substantive portion of the visit, I reviewed the NP/PA documentation, treatment plan, and medical decision making.  I had face to face time with this patient     A. History:    B. Physical exam:    C. Medical decision making:    Discharge Planning and Disposition: No mobility needs. Ambulating well. Good social support system.   Anticipated discharge    All diagnosis and differential diagnosis have been reviewed; assessment and plan has been documented; I have personally reviewed the labs and test results that are presently available; I have reviewed  the patients medication list; I have reviewed the consulting providers response and recommendations. I have reviewed or attempted to review medical records based upon their availability.    All of the patient and family questions have been addressed and answered. Patient's is agreeable to the above stated plan. I will continue to monitor closely and make adjustments to medical management as needed.      ARNIE Bobby   06/06/2022

## 2022-06-06 NOTE — ED PROVIDER NOTES
Encounter Date: 6/6/2022    SCRIBE #1 NOTE: I, Yasmeen Rahman, am scribing for, and in the presence of,  Leora Leija MD. I have scribed the following portions of the note - the EKG reading. Other sections scribed: HPI, ROS, and physical examination.       History     Chief Complaint   Patient presents with    Weakness    Loss of Consciousness     Pt presents c/o syncopal episodes/weakness. Onset Friday/ seen at [facility]/hit head/on thinners/ct neg.  /  PMH afib/eliquis.      81 y.o. male with a history of A-fib, aortic valve stenosis, HTN, and HLD, on anticoagulation, presents to ED after having multiple episodes of syncope and weakness for the past few days. Daughter at bedside. She states pt had a viral illness last week at which time he was also having dark stools. He was seen in ED 1 week ago, and he was found to be anemic. Pt received 2 units of blood and had an EGD that showed a hernia with no active bleeding. Pt has had multiple syncopal episodes since then; he states it mainly happens when he bends over. On Friday he had 2 syncopal episode and suffered a hematoma to right scalp. He had a CT scan of head at that time and it was normal. Pt was supposed to have a TAVR procedure last Tuesday, but it was rescheduled to next Tuesday (7/14/22) due to his symptoms. He has been eating and drinking normally. Pt endorses LE swelling, but states he has been unable to bend over to put his compression socks on.    The history is provided by the patient and a relative. No  was used.   Loss of Consciousness  This is a new problem. The current episode started more than 2 days ago. The problem occurs every several days. The problem has not changed since onset.Pertinent negatives include no chest pain, no abdominal pain, no headaches and no shortness of breath. The symptoms are aggravated by bending. Nothing relieves the symptoms. He has tried nothing for the symptoms.     Review of patient's allergies  indicates:  No Known Allergies  Past Medical History:   Diagnosis Date    Aortic valve stenosis     Atrial fibrillation     Hyperlipidemia     Hypertension     Stenosis of aortic and mitral valves      Past Surgical History:   Procedure Laterality Date    ESOPHAGOGASTRODUODENOSCOPY N/A 5/24/2022    Procedure: EGD (ESOPHAGOGASTRODUODENOSCOPY);  Surgeon: Lino Rider MD;  Location: [facility];  Service: Gastroenterology;  Laterality: N/A;     Family History   Problem Relation Age of Onset    Hypertension Mother     No Known Problems Father     No Known Problems Sister     No Known Problems Brother     No Known Problems Maternal Aunt     No Known Problems Maternal Uncle     No Known Problems Paternal Aunt     No Known Problems Paternal Uncle     No Known Problems Maternal Grandmother     No Known Problems Maternal Grandfather     No Known Problems Paternal Grandmother     No Known Problems Paternal Grandfather      Social History     Tobacco Use    Smoking status: Former Smoker    Smokeless tobacco: Never Used     Review of Systems   Constitutional: Negative for chills, diaphoresis and fever.   HENT: Negative for congestion, ear pain, sinus pain and sore throat.    Eyes: Negative for pain, discharge and visual disturbance.   Respiratory: Negative for cough, shortness of breath, wheezing and stridor.    Cardiovascular: Positive for syncope. Negative for chest pain and palpitations.   Gastrointestinal: Negative for abdominal pain, constipation, diarrhea, nausea, rectal pain and vomiting.   Genitourinary: Negative for dysuria and hematuria.   Musculoskeletal: Negative for back pain and myalgias.   Skin: Negative for rash.   Neurological: Positive for syncope and weakness (generalized). Negative for dizziness, numbness and headaches.   Hematological: Negative.    Psychiatric/Behavioral: Negative.    All other systems reviewed and are negative.      Physical Exam     Initial Vitals [06/06/22  1007]   BP Pulse Resp Temp SpO2   106/62 89 18 98.4 °F (36.9 °C) 98 %      MAP       --         Physical Exam    Nursing note and vitals reviewed.  Constitutional: He appears well-developed and well-nourished. He is not diaphoretic. No distress.   HENT:   Head: Normocephalic.   Nose: Nose normal.   Mouth/Throat: Oropharynx is clear and moist.   Healing abrasion to right scalp   Eyes: Conjunctivae and EOM are normal. Pupils are equal, round, and reactive to light.   Neck: Trachea normal. Neck supple.   Normal range of motion.  Cardiovascular: Normal rate and intact distal pulses. An irregularly irregular rhythm present.    Murmur heard.  1-2+ edema in bilateral LE   Pulmonary/Chest: Breath sounds normal. No respiratory distress. He has no wheezes. He has no rhonchi. He has no rales. He exhibits no tenderness.   Abdominal: Abdomen is soft. Bowel sounds are normal. He exhibits no distension and no mass. There is no abdominal tenderness. There is no rebound and no guarding.   Musculoskeletal:         General: No tenderness or edema. Normal range of motion.      Cervical back: Normal range of motion and neck supple.      Lumbar back: Normal. No tenderness. Normal range of motion.     Neurological: He is alert and oriented to person, place, and time. He has normal strength. No cranial nerve deficit or sensory deficit.   Skin: Skin is warm and dry. Capillary refill takes less than 2 seconds. No rash and no abscess noted. No erythema. No pallor.   Psychiatric: He has a normal mood and affect.         ED Course   Procedures  Labs Reviewed   COMPREHENSIVE METABOLIC PANEL - Abnormal; Notable for the following components:       Result Value    Creatinine 1.46 (*)     All other components within normal limits   APTT - Abnormal; Notable for the following components:    PTT 43.2 (*)     All other components within normal limits   PROTIME-INR - Abnormal; Notable for the following components:    PT 19.8 (*)     INR 1.71 (*)     All  other components within normal limits   URINALYSIS, REFLEX TO URINE CULTURE - Abnormal; Notable for the following components:    Color, UA Dark Yellow (*)     Protein, UA Trace (*)     All other components within normal limits   CBC WITH DIFFERENTIAL - Abnormal; Notable for the following components:    RBC 3.36 (*)     Hgb 10.3 (*)     Hct 31.4 (*)     MCHC 32.8 (*)     IG# 0.02 (*)     All other components within normal limits   TROPONIN I - Normal   URINALYSIS, MICROSCOPIC - Normal   SARS-COV-2 RNA AMPLIFICATION, QUAL - Normal   CBC W/ AUTO DIFFERENTIAL    Narrative:     The following orders were created for panel order CBC auto differential.  Procedure                               Abnormality         Status                     ---------                               -----------         ------                     CBC with Differential[[id number]]        Abnormal            Final result                 Please view results for these tests on the individual orders.     EKG Readings: (Independently Interpreted)   Rhythm: Atrial Fibrillation. Heart Rate: 92. Ectopy: No Ectopy. Axis: Right Axis Deviation.   EKG performed on 6/6/22 at 1017    Nonspecific ST abnormalities, mostly in the lateral leads.       Imaging Results          X-Ray Chest 1 View (Final result)  Result time 06/06/22 10:24:54    Final result by Chidi Reyes MD (06/06/22 10:24:54)                 Impression:      No acute cardiopulmonary process.      Electronically signed by: Chidi Reyes  Date:    06/06/2022  Time:    10:24             Narrative:    EXAMINATION:  XR CHEST 1 VIEW    CLINICAL HISTORY:  Weakness    TECHNIQUE:  Frontal view of the chest.    COMPARISON:  Chest radiographs 05/23/2022.    FINDINGS:  The cardiomediastinal silhouette is normal in size and contour. Pulmonary vascularity is within normal limits. The lungs are well-inflated and are without focal consolidation, pleural effusion, or pneumothorax.    The imaged osseous structures and  soft tissues are without acute abnormality.                                 Medications - No data to display  Medical Decision Making:   History:   Old Medical Records: I decided to obtain old medical records.  Old Records Summarized: records from previous admission(s).  Initial Assessment:   Recurrent syncope  Differential Diagnosis:   Syncope due to critical aortic stenosis, anemia, danny,   Independently Interpreted Test(s):   I have ordered and independently interpreted X-rays - see prior notes.  I have ordered and independently interpreted EKG Reading(s) - see prior notes  Clinical Tests:   Lab Tests: Ordered and Reviewed  The following lab test(s) were unremarkable: CBC, CMP, Troponin and BNP  Radiological Study: Ordered and Reviewed  Medical Tests: Ordered and Reviewed  ED Management:  Labs, ekg, cardiology consult, hospitalist admit  Other:   I have discussed this case with another health care provider.          Scribe Attestation:   Scribe #1: I performed the above scribed service and the documentation accurately describes the services I performed. I attest to the accuracy of the note.  Comments: Attending:   Physician Attestation Statement for Scribe #1: ILeora MD, personally performed the services described in this documentation. All medical record entries made by the scribe were at my direction and in my presence.  I have reviewed the chart and agree that the record reflects my personal performance and is accurate and complete.      Attending Attestation:           Physician Attestation for Scribe:  Physician Attestation Statement for Scribe #1: Leora DUGAN MD, reviewed documentation, as scribed by Yasmeen Rahman in my presence, and it is both accurate and complete.             ED Course as of 06/06/22 1347   Mon Jun 06, 2022   1133 Cardiology consulted [BS]   1139 RT requests hospitalist admit [BS]      ED Course User Index  [BS] Leora Leija MD             Clinical Impression:    Final diagnoses:  [R53.1] General weakness  [R53.1] Weakness  [R55] Syncope, unspecified syncope type (Primary)  [I48.91] Atrial fibrillation with controlled ventricular rate  [R29.6] Frequent falls  [I35.0] Aortic valve stenosis, etiology of cardiac valve disease unspecified          ED Disposition Condition    Observation               Leora Leija MD  06/06/22 1264

## 2022-06-06 NOTE — CONSULTS
Inpatient consult to Cardiology  Consult performed by: NICK GarciaCNP-BC  Consult ordered by: Leora Leiaj MD        [facility] - Emergency Dept  Cardiology  Consult Note    Patient Name: Geo Barrow  MRN: [id number]  Admission Date: 6/6/2022  Hospital Length of Stay: 0 days  Code Status: Prior   Attending Provider: Leora Leija MD   Consulting Provider: JOHANA Garcia  Primary Care Physician: Deidra Briggs MD  Principal Problem:<principal problem not specified>    Patient information was obtained from patient, past medical records and ER records.     Subjective:     Chief Complaint:  Consulted for syncope and Aortic stenosis     HPI:   This is an 81-year-old male, who is known to [facility] and [place], with history of CAD,PAF,carotid artery stenosis, HTN, HLD, severe aortic stenosis/pending TAVR, prostate cancer, CKD, anemia.  He presented to the ER on 06/06/2022 following multiple syncopal episodes and weakness over the past few days.  He was recently admitted at [facility] for anemia requiring blood transfusions.  His TAVR was rescheduled for 06/14/2022 secondary to these issues.  [facility] has been consulted for syncope and severe aortic stenosis.     PMH: CAD,PAF,carotid artery stenosis, HTN, HLD, severe aortic stenosis/pending TAVR, prostate cancer, CKD, anemia  PSH: LHC, inguinal hernia, prostate biopsy, bilateral cataract extraction  Social History:  Former smoker quit in 1975, Denies EtOH and illicit drug use  Family History: Unknown     Previous Cardiac Diagnostics:   Echo 5.23.22  Mild concentric hypertrophy and low normal systolic function.  The estimated ejection fraction is 50%.  There is severe aortic valve stenosis. Aortic valve area is 0.6 cm2; peak velocity is 4.94 m/s; mean gradient is 62 mmHg.  Moderate-to-severe mitral regurgitation.  Mild tricuspid regurgitation. The estimated PA systolic pressure is 38 mmHg.  Aortic valve area is 0.45 cm2; peak  velocity is 4.94 m/s; mean gradient is 62 mmHg.    L/RHC 4.27.22  LM:  Short vessel with luminal regularities, but if anything there is essentially separate ostia to the LAD and circumflex.  Lad:  Lad is a large vessel which wraps around the apex of the heart.  There is luminal irregularities proximal and mid vessel at the 1st diagonal.  LCX:  No angiographically apparent coronary artery disease.  RCA:  The RCA is a large vessel which gives rise to a PDA and PLB.  There is a mild irregularity in the mid vessel.  LVEDP 30 mm Hg.  Aortic valve:  MAREN 0.99 centimeter squared, mean gradient 40 mm Hg  Mild pulmonary hypertension.  Normal cardiac output with right heart catheterization pressures as below  PCWP 36/28  mean 31  PA 52/29 mean 40  RV 48/14  RA 18/19  Cardiac output per Anny 9.06 L/min  Cardiac index 4.3 L/min/m2     Echo 4.8.22  The study quality is average.  The left ventricle is normal in size.  Global left ventricular systolic function is normal.  The left ventricular ejection fraction appears to be 55-60%.  Left atrial diameter is moderately increased.  The right atrium is moderately enlarged.  The right ventricle is mildly enlarged.  Severe aortic valve stenosis is present.  Aortic valve area continuity equation is 0.8 cm2.  The transaortic mean gradient is 42 mm Hg.  Moderate mitral regurgitation.  Moderate tricuspid regurgitation.  Trace aortic regurgitation.  Trace pulmonic regurgitation.  The inferior vena cava is mildly increased in size with normal collapse upon inspiration.  The pulmonary artery systolic pressure is 47 mm Hg.  Evidence of pulmonary hypertension is noted.     Carotid US 4.8.22  The study quality is average.  1-39% stenosis in the bilateral internal carotid arteries  Antegrade flow in the bilateral vertebral arteries        Review of patient's allergies indicates:  No Known Allergies    No current facility-administered medications on file prior to encounter.     Current Outpatient  Medications on File Prior to Encounter   Medication Sig    allopurinoL (ZYLOPRIM) 100 MG tablet Take 1 tablet by mouth once daily at 6am.    amLODIPine (NORVASC) 10 MG tablet Take 1 tablet by mouth once daily at 6am.    apixaban (ELIQUIS) 5 mg Tab Take 1 tablet (5 mg total) by mouth 2 (two) times daily.    atorvastatin (LIPITOR) 20 MG tablet Take 1 tablet by mouth once daily at 6am.    benazepriL (LOTENSIN) 20 MG tablet Take 1 tablet by mouth once daily at 6am.    citalopram (CELEXA) 10 MG tablet Take 1 tablet by mouth once daily at 6am.    pantoprazole (PROTONIX) 40 MG tablet Take 1 tablet (40 mg total) by mouth 2 (two) times daily.    tamsulosin (FLOMAX) 0.4 mg Cap Take 1 capsule by mouth once daily at 6am.    furosemide (LASIX) 20 MG tablet Take 20 mg by mouth once daily. 3 times a week    [DISCONTINUED] warfarin (COUMADIN) 2.5 MG tablet Take 1 tablet by mouth daily as needed.       Review of Systems:  Review of Systems   Constitutional: Negative.    HENT: Negative.    Eyes: Negative.    Respiratory: Negative.    Cardiovascular: Negative.    Gastrointestinal: Negative.    Endocrine: Negative.    Genitourinary: Negative.    Musculoskeletal: Negative.    Skin: Negative.    Allergic/Immunologic: Negative.    Neurological: Positive for syncope.   Hematological: Negative.    Psychiatric/Behavioral: Negative.        Objective:     Vital Signs (Most Recent):  Temp: 98.4 °F (36.9 °C) (06/06/22 1007)  Pulse: 92 (06/06/22 1207)  Resp: 17 (06/06/22 1157)  BP: 113/69 (06/06/22 1157)  SpO2: 97 % (06/06/22 1157) Vital Signs (24h Range):  Temp:  [98.4 °F (36.9 °C)] 98.4 °F (36.9 °C)  Pulse:  [] 92  Resp:  [14-18] 17  SpO2:  [97 %-98 %] 97 %  BP: (103-113)/(62-82) 113/69     Weight: 96.2 kg (212 lb)  Body mass index is 28.75 kg/m².    SpO2: 97 %  O2 Device (Oxygen Therapy): room air    No intake or output data in the 24 hours ending 06/06/22 1220    Lines/Drains/Airways     Peripheral Intravenous Line  Duration                 Peripheral IV - Single Lumen 05/23/22 0900 18 G Distal;Posterior;Left Forearm 14 days                  Significant Labs:   CMP   Recent Labs   Lab 06/06/22  1031      K 4.0   CO2 27   BUN 12.6   CREATININE 1.46*   CALCIUM 9.4   ALBUMIN 3.9   BILITOT 0.7   ALKPHOS 94   AST 31   ALT 33   EGFRNONAA 49   , CBC   Recent Labs   Lab 06/06/22  1031   WBC 5.1   HGB 10.3*   HCT 31.4*      , INR   Recent Labs   Lab 06/06/22  1031   INR 1.71*   PROTIME 19.8*    and Troponin   Recent Labs   Lab 06/06/22  1031   TROPONINI 0.015         Significant Imaging:   Imaging Results          X-Ray Chest 1 View (Final result)  Result time 06/06/22 10:24:54    Final result by Chidi Reyes MD (06/06/22 10:24:54)                 Impression:      No acute cardiopulmonary process.      Electronically signed by: Chidi eRyes  Date:    06/06/2022  Time:    10:24             Narrative:    EXAMINATION:  XR CHEST 1 VIEW    CLINICAL HISTORY:  Weakness    TECHNIQUE:  Frontal view of the chest.    COMPARISON:  Chest radiographs 05/23/2022.    FINDINGS:  The cardiomediastinal silhouette is normal in size and contour. Pulmonary vascularity is within normal limits. The lungs are well-inflated and are without focal consolidation, pleural effusion, or pneumothorax.    The imaged osseous structures and soft tissues are without acute abnormality.                                  EKG:  No results found for this visit on 06/06/22.    Telemetry:      Physical Exam:  Physical Exam  Constitutional:       Appearance: Normal appearance.   HENT:      Head: Normocephalic.   Eyes:      Extraocular Movements: Extraocular movements intact.      Conjunctiva/sclera: Conjunctivae normal.   Cardiovascular:      Rate and Rhythm: Normal rate and regular rhythm.      Pulses: Normal pulses.      Heart sounds: Murmur heard.   Pulmonary:      Effort: Pulmonary effort is normal.      Breath sounds: Normal breath sounds.   Abdominal:      Palpations:  Abdomen is soft.   Musculoskeletal:         General: Normal range of motion.      Cervical back: Neck supple.   Skin:     General: Skin is warm and dry.   Neurological:      Mental Status: He is alert and oriented to person, place, and time. Mental status is at baseline.   Psychiatric:         Mood and Affect: Mood normal.         Behavior: Behavior normal.           Home Medications:   No current facility-administered medications on file prior to encounter.     Current Outpatient Medications on File Prior to Encounter   Medication Sig Dispense Refill    allopurinoL (ZYLOPRIM) 100 MG tablet Take 1 tablet by mouth once daily at 6am.      amLODIPine (NORVASC) 10 MG tablet Take 1 tablet by mouth once daily at 6am.      apixaban (ELIQUIS) 5 mg Tab Take 1 tablet (5 mg total) by mouth 2 (two) times daily. 60 tablet 0    atorvastatin (LIPITOR) 20 MG tablet Take 1 tablet by mouth once daily at 6am.      benazepriL (LOTENSIN) 20 MG tablet Take 1 tablet by mouth once daily at 6am.      citalopram (CELEXA) 10 MG tablet Take 1 tablet by mouth once daily at 6am.      pantoprazole (PROTONIX) 40 MG tablet Take 1 tablet (40 mg total) by mouth 2 (two) times daily. 60 tablet 5    tamsulosin (FLOMAX) 0.4 mg Cap Take 1 capsule by mouth once daily at 6am.      furosemide (LASIX) 20 MG tablet Take 20 mg by mouth once daily. 3 times a week      [DISCONTINUED] warfarin (COUMADIN) 2.5 MG tablet Take 1 tablet by mouth daily as needed.         Current Inpatient Medications:  No current facility-administered medications for this encounter.    Current Outpatient Medications:     allopurinoL (ZYLOPRIM) 100 MG tablet, Take 1 tablet by mouth once daily at 6am., Disp: , Rfl:     amLODIPine (NORVASC) 10 MG tablet, Take 1 tablet by mouth once daily at 6am., Disp: , Rfl:     apixaban (ELIQUIS) 5 mg Tab, Take 1 tablet (5 mg total) by mouth 2 (two) times daily., Disp: 60 tablet, Rfl: 0    atorvastatin (LIPITOR) 20 MG tablet, Take 1 tablet  by mouth once daily at 6am., Disp: , Rfl:     benazepriL (LOTENSIN) 20 MG tablet, Take 1 tablet by mouth once daily at 6am., Disp: , Rfl:     citalopram (CELEXA) 10 MG tablet, Take 1 tablet by mouth once daily at 6am., Disp: , Rfl:     pantoprazole (PROTONIX) 40 MG tablet, Take 1 tablet (40 mg total) by mouth 2 (two) times daily., Disp: 60 tablet, Rfl: 5    tamsulosin (FLOMAX) 0.4 mg Cap, Take 1 capsule by mouth once daily at 6am., Disp: , Rfl:     furosemide (LASIX) 20 MG tablet, Take 20 mg by mouth once daily. 3 times a week, Disp: , Rfl:            Assessment:       IMPRESSION:  Syncope likely secondary to orthostatic hypotension  Anemia  VHD/severe AS, awaiting TAVR  Mild CAD  HTN  HLD  Bilateral carotid artery stenosis    PLAN:     Plan:  Detailed discussion held with family regarding options for treatment.  DC Flomax as this could contribute to orthostatic hypotension  Decrease Amlodipine to 5mg daily and benazepril to 10mg daily  Start Finasteride 5mg daily  Limited echo for LVEF  Keep TAVR scheduled for 6.14.22, which is next TAVR day.  Consult Dr. Michelle to discuss SAVR vs TAVR options with patient and his family.    Thank you for your consult.     Yenny Ma MD  Cardiology  06/06/2022

## 2022-06-06 NOTE — FIRST PROVIDER EVALUATION
Medical screening exam completed.  I have conducted a focused provider triage encounter, findings are as follows:    Chief Complaint   Patient presents with    Weakness    Loss of Consciousness     Pt presents c/o syncopal episodes/weakness. Onset Friday/ seen at [facility]/hit head/on thinners/ct neg.  /  PMH afib/eliquis.      Brief history of present illness:  81 y.o. male presents to the ED with worsening generalized weakness and multiple syncopal episode over the last few days. Seen at [facility] Friday with negative workup and dc'ed home. Is scheduled for TAVR next Tuesday with Dr Fragoso but told to come in early due to symptoms. Hx of afib, on Eliquis. Denies chest pain or SOB. Daughter notes recent GI bleed as well.    Vitals:    06/06/22 1007   BP: 106/62   BP Location: Left arm   Patient Position: Sitting   Pulse: 89   Resp: 18   Temp: 98.4 °F (36.9 °C)   TempSrc: Oral   SpO2: 98%   Weight: 96.2 kg (212 lb)       Pertinent physical exam:  Awake, alert, ambulatory, non-labored respirations    Brief workup plan:  Labs, EKG, CXR, UA    Preliminary workup initiated; this workup will be continued and followed by the physician or advanced practice provider that is assigned to the patient when roomed.

## 2022-06-07 LAB
ALBUMIN SERPL-MCNC: 3.4 GM/DL (ref 3.4–4.8)
ALBUMIN/GLOB SERPL: 1.5 RATIO (ref 1.1–2)
ALP SERPL-CCNC: 83 UNIT/L (ref 40–150)
ALT SERPL-CCNC: 28 UNIT/L (ref 0–55)
AST SERPL-CCNC: 23 UNIT/L (ref 5–34)
BASOPHILS # BLD AUTO: 0.06 X10(3)/MCL (ref 0–0.2)
BASOPHILS NFR BLD AUTO: 1.2 %
BILIRUBIN DIRECT+TOT PNL SERPL-MCNC: 0.8 MG/DL
BUN SERPL-MCNC: 11 MG/DL (ref 8.4–25.7)
CALCIUM SERPL-MCNC: 8.8 MG/DL (ref 8.8–10)
CHLORIDE SERPL-SCNC: 106 MMOL/L (ref 98–107)
CO2 SERPL-SCNC: 28 MMOL/L (ref 23–31)
CREAT SERPL-MCNC: 1.21 MG/DL (ref 0.73–1.18)
EOSINOPHIL # BLD AUTO: 0.4 X10(3)/MCL (ref 0–0.9)
EOSINOPHIL NFR BLD AUTO: 7.8 %
ERYTHROCYTE [DISTWIDTH] IN BLOOD BY AUTOMATED COUNT: 13.2 % (ref 11.5–17)
GLOBULIN SER-MCNC: 2.3 GM/DL (ref 2.4–3.5)
GLUCOSE SERPL-MCNC: 96 MG/DL (ref 82–115)
HCT VFR BLD AUTO: 27.9 % (ref 42–52)
HGB BLD-MCNC: 9.2 GM/DL (ref 14–18)
IMM GRANULOCYTES # BLD AUTO: 0.01 X10(3)/MCL (ref 0–0.02)
IMM GRANULOCYTES NFR BLD AUTO: 0.2 % (ref 0–0.43)
LEFT VENTRICLE DIASTOLIC VOLUME: 139 ML
LEFT VENTRICLE SYSTOLIC VOLUME: 69.9 ML
LYMPHOCYTES # BLD AUTO: 0.68 X10(3)/MCL (ref 0.6–4.6)
LYMPHOCYTES NFR BLD AUTO: 13.3 %
MAGNESIUM SERPL-MCNC: 2.2 MG/DL (ref 1.6–2.6)
MCH RBC QN AUTO: 30.7 PG (ref 27–31)
MCHC RBC AUTO-ENTMCNC: 33 MG/DL (ref 33–36)
MCV RBC AUTO: 93 FL (ref 80–94)
MONOCYTES # BLD AUTO: 0.57 X10(3)/MCL (ref 0.1–1.3)
MONOCYTES NFR BLD AUTO: 11.1 %
NEUTROPHILS # BLD AUTO: 3.4 X10(3)/MCL (ref 2.1–9.2)
NEUTROPHILS NFR BLD AUTO: 66.4 %
NRBC BLD AUTO-RTO: 0 %
PHOSPHATE SERPL-MCNC: 3.6 MG/DL (ref 2.3–4.7)
PLATELET # BLD AUTO: 197 X10(3)/MCL (ref 130–400)
PMV BLD AUTO: 10.1 FL (ref 9.4–12.4)
POTASSIUM SERPL-SCNC: 3.4 MMOL/L (ref 3.5–5.1)
PROT SERPL-MCNC: 5.7 GM/DL (ref 5.8–7.6)
RBC # BLD AUTO: 3 X10(6)/MCL (ref 4.7–6.1)
SODIUM SERPL-SCNC: 141 MMOL/L (ref 136–145)
WBC # SPEC AUTO: 5.1 X10(3)/MCL (ref 4.5–11.5)

## 2022-06-07 PROCEDURE — 36415 COLL VENOUS BLD VENIPUNCTURE: CPT | Performed by: INTERNAL MEDICINE

## 2022-06-07 PROCEDURE — 25000003 PHARM REV CODE 250: Performed by: INTERNAL MEDICINE

## 2022-06-07 PROCEDURE — 83735 ASSAY OF MAGNESIUM: CPT | Performed by: INTERNAL MEDICINE

## 2022-06-07 PROCEDURE — 94761 N-INVAS EAR/PLS OXIMETRY MLT: CPT

## 2022-06-07 PROCEDURE — 80053 COMPREHEN METABOLIC PANEL: CPT | Performed by: INTERNAL MEDICINE

## 2022-06-07 PROCEDURE — 25000003 PHARM REV CODE 250: Performed by: NURSE PRACTITIONER

## 2022-06-07 PROCEDURE — G0378 HOSPITAL OBSERVATION PER HR: HCPCS

## 2022-06-07 PROCEDURE — 84100 ASSAY OF PHOSPHORUS: CPT | Performed by: INTERNAL MEDICINE

## 2022-06-07 PROCEDURE — 85025 COMPLETE CBC W/AUTO DIFF WBC: CPT | Performed by: INTERNAL MEDICINE

## 2022-06-07 RX ORDER — METOPROLOL TARTRATE 25 MG/1
25 TABLET, FILM COATED ORAL 2 TIMES DAILY
Status: DISCONTINUED | OUTPATIENT
Start: 2022-06-07 | End: 2022-06-08 | Stop reason: HOSPADM

## 2022-06-07 RX ORDER — METOPROLOL TARTRATE 25 MG/1
25 TABLET, FILM COATED ORAL 2 TIMES DAILY
Qty: 60 TABLET | Refills: 11 | Status: SHIPPED | OUTPATIENT
Start: 2022-06-07 | End: 2022-06-21

## 2022-06-07 RX ORDER — POTASSIUM CHLORIDE 20 MEQ/1
40 TABLET, EXTENDED RELEASE ORAL ONCE
Status: COMPLETED | OUTPATIENT
Start: 2022-06-07 | End: 2022-06-07

## 2022-06-07 RX ORDER — POTASSIUM CHLORIDE 20 MEQ/1
40 TABLET, EXTENDED RELEASE ORAL ONCE
Status: DISCONTINUED | OUTPATIENT
Start: 2022-06-07 | End: 2022-06-07

## 2022-06-07 RX ADMIN — APIXABAN 5 MG: 5 TABLET, FILM COATED ORAL at 08:06

## 2022-06-07 RX ADMIN — CITALOPRAM HYDROBROMIDE 10 MG: 10 TABLET ORAL at 10:06

## 2022-06-07 RX ADMIN — METOPROLOL TARTRATE 25 MG: 25 TABLET, FILM COATED ORAL at 09:06

## 2022-06-07 RX ADMIN — POTASSIUM CHLORIDE 40 MEQ: 1500 TABLET, EXTENDED RELEASE ORAL at 09:06

## 2022-06-07 RX ADMIN — LISINOPRIL 10 MG: 10 TABLET ORAL at 08:06

## 2022-06-07 RX ADMIN — AMLODIPINE BESYLATE 5 MG: 5 TABLET ORAL at 05:06

## 2022-06-07 RX ADMIN — ATORVASTATIN CALCIUM 20 MG: 10 TABLET, FILM COATED ORAL at 05:06

## 2022-06-07 RX ADMIN — FINASTERIDE 5 MG: 5 TABLET, FILM COATED ORAL at 08:06

## 2022-06-07 RX ADMIN — PANTOPRAZOLE SODIUM 40 MG: 40 TABLET, DELAYED RELEASE ORAL at 08:06

## 2022-06-07 RX ADMIN — ALLOPURINOL 100 MG: 100 TABLET ORAL at 05:06

## 2022-06-07 RX ADMIN — METOPROLOL TARTRATE 25 MG: 25 TABLET, FILM COATED ORAL at 08:06

## 2022-06-07 NOTE — PROGRESS NOTES
[facility] MEDICINE PROGRESS NOTE      Patient Name: Geo Barrow  MRN: [id number]  Admission Date: 6/6/2022 10:11 AM  Status: OP- Observation   Length of Stay: 1 days  Face-to-Face encounter date: 06/07/2022       CHIEF COMPLAINT  Weakness and Loss of Consciousness (Pt presents c/o syncopal episodes/weakness. Onset Friday/ seen at [facility]/hit head/on thinners/ct neg.  /  PMH afib/eliquis. )    HPI  81-year-old  male who has a past medical history of severe aortic stenosis-requiring a TAVR, atrial fibrillation, hyperlipidemia, hypertension, mitral valve stenosis who presented to the ED today with increasing frequency of syncopal episodes at home- has fallen recently and hit his head, however CT scan on Friday at [facility] ED was negative; pt not having any alterations in mental status; previously on warfarin recently placed on Eliquis.Developed a GI bleed over the last couple of weeks and his TAVR procedure had to be rescheduled. Currently TAVR is re-scheduled for next Tuesday. Patient also reports having a stomach virus a few weeks ago and was dehydrated at that time. Daughter is present at the bedside.     HOSPITAL COURSE  Seen by cardiology. Orthostatic vital signs negative (BP laying 119/66, sitting 109/59, standing 101/69)    Today (06/07/2022):  Patient denies any complaints today.  No further episodes of syncope.  Family at bedside.    OBJECTIVE    VITAL SIGNS: 24 HRS MIN & MAX LAST   Temp  Min: 97.5 °F (36.4 °C)  Max: 98.3 °F (36.8 °C) 97.6 °F (36.4 °C)   BP  Min: 98/65  Max: 135/68 111/70   Pulse  Min: 65  Max: 108  65   Resp  Min: 12  Max: 20 16   SpO2  Min: 95 %  Max: 98 % 97 %       LABS/MICROBIOLOGY/MEDICATIONS/DIAGNOSTICS  I have reviewed all pertinent lab results within the past 24 hours.    Recent Labs   Lab 06/06/22  1031 06/07/22  0515   WBC 5.1 5.1   RBC 3.36* 3.00*   HGB 10.3* 9.2*   HCT 31.4* 27.9*   MCV 93.5 93.0   MCH 30.7 30.7   MCHC 32.8* 33.0   RDW 13.2  13.2    197   MPV 10.3 10.1       Recent Labs   Lab 06/06/22  1031 06/07/22  0515    141   K 4.0 3.4*   CO2 27 28   BUN 12.6 11.0   CREATININE 1.46* 1.21*   CALCIUM 9.4 8.8   MG  --  2.20   ALBUMIN 3.9 3.4   ALKPHOS 94 83   ALT 33 28   AST 31 23   BILITOT 0.7 0.8       Recent Labs     06/06/22  1031 06/07/22  0515   WBC 5.1 5.1   RBC 3.36* 3.00*   HGB 10.3* 9.2*   HCT 31.4* 27.9*   MCV 93.5 93.0   MCH 30.7 30.7   MCHC 32.8* 33.0   RDW 13.2 13.2     Recent Labs     06/06/22  1031 06/07/22  0515   CHLORIDE 106 106   CO2 27 28   BUN 12.6 11.0   CREATININE 1.46* 1.21*   GLUCOSE 104 96   CALCIUM 9.4 8.8   ALBUMIN 3.9 3.4   ALKPHOS 94 83   ALT 33 28   AST 31 23   INR 1.71*  --    TROPONINI 0.015  --        Microbiology Results (last 7 days)     ** No results found for the last 168 hours. **           Echo  Limited echo to reassess EF. Previous echo stated 50% EF. The current EF   is still 45% with atrial fibrillation.        Scheduled Med:   allopurinoL  100 mg Oral Daily    apixaban  5 mg Oral BID    atorvastatin  20 mg Oral Daily    citalopram  10 mg Oral Daily    finasteride  5 mg Oral Daily    metoprolol tartrate  25 mg Oral BID    pantoprazole  40 mg Oral BID        Continuous Infusions:       PRN Meds:  melatonin, ondansetron, sodium chloride 0.9%       PHYSICAL EXAM  Constitutional: Appears well-developed, well-nourished and appears stated age. No acute distress.   Head/Eyes/Ears/Nose/Mouth/Throat: Normocephalic, atraumatic. PERRLA, EOM intact. Conjunctive clear, sclera white. Pinna no masses, lesions, tenderness, drainage. Pharynx pink.  Neck: No JVD present. Normal alignment and mobility.  Cardiovascular: Irregular rate and rhythm, S1 / S2, + systolic murmur, no rubs or gallops.  Pulmonary/Chest: Effort normal. No respiratory distress. Breath sounds normal. No rales, rhonchi, rubs or wheezes.    Abdominal: Flat, soft, non-tender, non-distended, no rebound tenderness or guarding, normal bowel  sounds in all four quadrants..  Musculoskeletal: Extremities symmetric, no tenderness, weakness, or swelling. Maintains flexion against resistance.   Extremities:  Nails: No clubbing, cyanosis, petechiae or nodes. 2+ bilateral pedal and radial pulses with absence of bilateral lower ext edema.  Neurological: Alert and oriented to person, place, and time. Cooperative. Speech clear, appropriate. No dysarthria. Cranial nerves II-XII grossly intact. No focal deficits.  Psychiatric: No depression, anxiety or agitation. Normal affect, no hallucinations or suicidal ideations, no pressured speech.    ASSESSMENT  Recurrent syncope with falls: suspect due to severe aortic stenosis vs. Hypotension   Severe aortic stenosis, planned for TAVR next week   Mild Acute kidney injury  Paroxysmal atrial fibrillation, on Eliquis  CAD  Essential hypertension  Dyslipidemia  Chronic anemia  Gout  GERD  BPH  Hx of prostate cancer  Vitamin D deficiency  Hypokalemia    PLAN  Telemetry monitoring  Repeat echocardiogram shows EF 45%  Monitor renal function   Replace electrolytes prn  Seen by cardiology who made the medication changes below:  Start Lopressor 25mg BID with hold parameters  DC Flomax, Amlodipine and Benazepril   Continue Finasteride 5mg daily  Keep TAVR scheduled for 6.14.22, which is next TAVR day.  Consult Dr. Michelle to discuss SAVR vs TAVR options with patient and his family (pending)  Keep Scheduled F/U with Dr. Fragoso on 6.9.22  DVT Prophylaxis: SCDs, on Eliquis     Patient condition:  Fair    Anticipated discharge and disposition: DC home tomorrow if stable  __________________________________________________________________________    All diagnosis and differential diagnosis have been reviewed; assessment and plan have been documented. I have personally reviewed the test results that are presently available; I have reviewed the patient's medication list. I have reviewed the consulting providers' recommendations. I have  reviewed or attempted to review medical records based upon their availability.  All of the patient's questions have been addressed and answered. Patient is agreeable to the above stated plan. I will continue to monitor closely and make adjustment to medical management as needed.    This document was created with the assistance of a voice recognition software. There may be transcription errors as a result of using this technology, however minimal. Effort has been made to ensure accuracy of transcription but any obvious errors or omissions should be clarified with the author of this document.        Emily Mcarthur MD   [facility] Medicine  06/07/2022

## 2022-06-07 NOTE — PROGRESS NOTES
[facility] Medical Telemetry  Cardiology  Progress Note    Patient Name: Geo Barrow  MRN: [id number]  Admission Date: 6/6/2022  Hospital Length of Stay: 0 days  Code Status: Full Code   Attending Physician: Emily Mcarthur MD   Primary Care Physician: Deidra Briggs MD  Expected Discharge Date:   Principal Problem:Syncope    Subjective:     Brief HPI:   This is an 81-year-old male, who is known to Steven Jacques and [place], with history of CAD,PAF,carotid artery stenosis, HTN, HLD, severe aortic stenosis/pending TAVR, prostate cancer, CKD, anemia.  He presented to the ER on 06/06/2022 following multiple syncopal episodes and weakness over the past few days.  He was recently admitted at [facility] for anemia requiring blood transfusions.  His TAVR was rescheduled for 06/14/2022 secondary to these issues.  [facility] has been consulted for syncope and severe aortic stenosis.    Hospital Course:   6.7.22: NAD noted. VSS. Denies CP/SOB/Palps. Afib with intermittent RVR this AM.     PMH: CAD,PAF,carotid artery stenosis, HTN, HLD, severe aortic stenosis/pending TAVR, prostate cancer, CKD, anemia  PSH: LHC, inguinal hernia, prostate biopsy, bilateral cataract extraction  Social History:  Former smoker quit in 1975, Denies EtOH and illicit drug use  Family History: Unknown     Previous Cardiac Diagnostics:   Echo 6.6.22  LVEF 45%, patient in AFib    Echo 5.23.22  Mild concentric hypertrophy and low normal systolic function.  The estimated ejection fraction is 50%.  There is severe aortic valve stenosis. Aortic valve area is 0.6 cm2; peak velocity is 4.94 m/s; mean gradient is 62 mmHg.  Moderate-to-severe mitral regurgitation.  Mild tricuspid regurgitation. The estimated PA systolic pressure is 38 mmHg.  Aortic valve area is 0.45 cm2; peak velocity is 4.94 m/s; mean gradient is 62 mmHg.     L/RHC 4.27.22  LM:  Short vessel with luminal regularities, but if anything there is essentially separate ostia to the LAD  and circumflex.  Lad:  Lad is a large vessel which wraps around the apex of the heart.  There is luminal irregularities proximal and mid vessel at the 1st diagonal.  LCX:  No angiographically apparent coronary artery disease.  RCA:  The RCA is a large vessel which gives rise to a PDA and PLB.  There is a mild irregularity in the mid vessel.  LVEDP 30 mm Hg.  Aortic valve:  MAREN 0.99 centimeter squared, mean gradient 40 mm Hg  Mild pulmonary hypertension.  Normal cardiac output with right heart catheterization pressures as below  PCWP 36/28  mean 31  PA 52/29 mean 40  RV 48/14  RA 18/19  Cardiac output per Anny 9.06 L/min  Cardiac index 4.3 L/min/m2     Echo 4.8.22  The study quality is average.  The left ventricle is normal in size.  Global left ventricular systolic function is normal.  The left ventricular ejection fraction appears to be 55-60%.  Left atrial diameter is moderately increased.  The right atrium is moderately enlarged.  The right ventricle is mildly enlarged.  Severe aortic valve stenosis is present.  Aortic valve area continuity equation is 0.8 cm2.  The transaortic mean gradient is 42 mm Hg.  Moderate mitral regurgitation.  Moderate tricuspid regurgitation.  Trace aortic regurgitation.  Trace pulmonic regurgitation.  The inferior vena cava is mildly increased in size with normal collapse upon inspiration.  The pulmonary artery systolic pressure is 47 mm Hg.  Evidence of pulmonary hypertension is noted.     Carotid US 4.8.22  The study quality is average.  1-39% stenosis in the bilateral internal carotid arteries  Antegrade flow in the bilateral vertebral arteries        Review of Systems   Constitutional: Negative for chills and fever.   Cardiovascular: Negative for chest pain and palpitations.   Respiratory: Negative for shortness of breath.    Psychiatric/Behavioral: Negative for altered mental status.           Objective:     Vital Signs (Most Recent):  Temp: 98 °F (36.7 °C) (06/07/22  0709)  Pulse: 84 (06/07/22 0709)  Resp: 12 (06/07/22 0709)  BP: 117/74 (06/07/22 0859)  SpO2: 97 % (06/07/22 0709) Vital Signs (24h Range):  Temp:  [97.8 °F (36.6 °C)-98.4 °F (36.9 °C)] 98 °F (36.7 °C)  Pulse:  [] 84  Resp:  [12-20] 12  SpO2:  [95 %-99 %] 97 %  BP: (103-135)/(56-95) 117/74     Weight: 96.2 kg (212 lb)  Body mass index is 28.75 kg/m².    SpO2: 97 %  O2 Device (Oxygen Therapy): room air      Intake/Output Summary (Last 24 hours) at 6/7/2022 0916  Last data filed at 6/7/2022 0513  Gross per 24 hour   Intake --   Output 2250 ml   Net -2250 ml       Lines/Drains/Airways       Peripheral Intravenous Line  Duration                  Peripheral IV - Single Lumen 06/06/22 1115 20 G Right Antecubital <1 day                    Significant Labs:   CMP   Recent Labs   Lab 06/06/22  1031 06/07/22  0515    141   K 4.0 3.4*   CO2 27 28   BUN 12.6 11.0   CREATININE 1.46* 1.21*   CALCIUM 9.4 8.8   ALBUMIN 3.9 3.4   BILITOT 0.7 0.8   ALKPHOS 94 83   AST 31 23   ALT 33 28   EGFRNONAA 49 >60    and CBC   Recent Labs   Lab 06/06/22  1031 06/07/22  0515   WBC 5.1 5.1   HGB 10.3* 9.2*   HCT 31.4* 27.9*    197       Telemetry:  Afib    Physical Exam:  Physical Exam  Constitutional:       Appearance: Normal appearance.   HENT:      Head: Normocephalic.   Eyes:      Extraocular Movements: Extraocular movements intact.      Conjunctiva/sclera: Conjunctivae normal.   Cardiovascular:      Rate and Rhythm: Normal rate and regular rhythm.      Pulses: Normal pulses.      Heart sounds: Murmur heard.   Pulmonary:      Effort: Pulmonary effort is normal.      Breath sounds: Normal breath sounds.   Abdominal:      Palpations: Abdomen is soft.   Musculoskeletal:         General: Normal range of motion.      Cervical back: Neck supple.   Skin:     General: Skin is warm and dry.   Neurological:      Mental Status: He is alert and oriented to person, place, and time. Mental status is at baseline.   Psychiatric:          Mood and Affect: Mood normal.         Behavior: Behavior normal.         Current Inpatient Medications:    Current Facility-Administered Medications:     allopurinoL tablet 100 mg, 100 mg, Oral, Daily, Emily Mcarthur MD, 100 mg at 06/07/22 0517    amLODIPine tablet 5 mg, 5 mg, Oral, Daily, JOHANA Garcia, 5 mg at 06/07/22 0516    apixaban tablet 5 mg, 5 mg, Oral, BID, Emily Mcarthur MD, 5 mg at 06/07/22 0854    atorvastatin tablet 20 mg, 20 mg, Oral, Daily, JOHANA Garcia, 20 mg at 06/07/22 0517    citalopram tablet 10 mg, 10 mg, Oral, Daily, Emily Mcarthur MD    finasteride tablet 5 mg, 5 mg, Oral, Daily, JOHANA Garcia, 5 mg at 06/07/22 0854    lisinopriL tablet 10 mg, 10 mg, Oral, Daily, JOHANA Garcia, 10 mg at 06/07/22 0859    melatonin tablet 6 mg, 6 mg, Oral, Nightly PRN, Emily Mcarthur MD    metoprolol tartrate (LOPRESSOR) tablet 25 mg, 25 mg, Oral, BID, JOHANA Garcia    ondansetron injection 4 mg, 4 mg, Intravenous, Q8H PRN, Emily Mcarthur MD    pantoprazole EC tablet 40 mg, 40 mg, Oral, BID, Emily Mcarthur MD, 40 mg at 06/07/22 0853    potassium chloride SA CR tablet 40 mEq, 40 mEq, Oral, Once, Emily Mcarthur MD    sodium chloride 0.9% flush 10 mL, 10 mL, Intravenous, PRN, Emily Mcarthur MD        Assessment:     IMPRESSION:  Syncope likely secondary to orthostatic hypotension  Chronic Afib   -[id number]   -on Eliquis  Anemia  VHD/severe AS, awaiting TAVR  Mild CAD  HTN  HLD  Bilateral carotid artery stenosis      Plan:     Plan:  Detailed discussion held with family regarding options for treatment.  Start Lopressor 25mg BID with hold parameters  DC Amlodipine and Benazepril   Continue Finasteride 5mg daily  Keep TAVR scheduled for 6.14.22, which is next TAVR day.  Consult Dr. Michelle to discuss SAVR vs TAVR options with patient and his family. (pending)  Keep Scheduled F/U with Dr. Fragoso on 6.9.22  Will sign off. Thank you  for allowing us to participate in the care of this patient. Please call us with any questions.      Yenny Ma MD  Cardiology  06/07/2022

## 2022-06-07 NOTE — PLAN OF CARE
06/07/2022-Kanika attempts to reach patient &/or family member via room phone and cell phone have failed. Dang letter scanned into account.Glynn BONILLA

## 2022-06-07 NOTE — PLAN OF CARE
Problem: Adult Inpatient Plan of Care  Goal: Plan of Care Review  Outcome: Ongoing, Progressing  Goal: Patient-Specific Goal (Individualized)  Outcome: Ongoing, Progressing  Goal: Optimal Comfort and Wellbeing  Outcome: Ongoing, Progressing  Intervention: Monitor Pain and Promote Comfort  Flowsheets (Taken 6/7/2022 1833)  Pain Management Interventions:   quiet environment facilitated   relaxation techniques promoted  Intervention: Provide Person-Centered Care  Flowsheets (Taken 6/7/2022 1833)  Trust Relationship/Rapport:   care explained   questions encouraged   questions answered  Goal: Readiness for Transition of Care  Outcome: Ongoing, Progressing  Intervention: Mutually Develop Transition Plan  Flowsheets (Taken 6/7/2022 1833)  Equipment Currently Used at Home: none

## 2022-06-08 VITALS
BODY MASS INDEX: 28.71 KG/M2 | WEIGHT: 212 LBS | OXYGEN SATURATION: 97 % | HEIGHT: 72 IN | SYSTOLIC BLOOD PRESSURE: 120 MMHG | DIASTOLIC BLOOD PRESSURE: 70 MMHG | TEMPERATURE: 99 F | HEART RATE: 78 BPM | RESPIRATION RATE: 20 BRPM

## 2022-06-08 LAB
ANION GAP SERPL CALC-SCNC: 8 MEQ/L
BASOPHILS # BLD AUTO: 0.07 X10(3)/MCL (ref 0–0.2)
BASOPHILS NFR BLD AUTO: 1.3 %
BUN SERPL-MCNC: 16.7 MG/DL (ref 8.4–25.7)
CALCIUM SERPL-MCNC: 9.1 MG/DL (ref 8.8–10)
CHLORIDE SERPL-SCNC: 106 MMOL/L (ref 98–107)
CO2 SERPL-SCNC: 26 MMOL/L (ref 23–31)
CREAT SERPL-MCNC: 1.45 MG/DL (ref 0.73–1.18)
CREAT/UREA NIT SERPL: 12
EOSINOPHIL # BLD AUTO: 0.41 X10(3)/MCL (ref 0–0.9)
EOSINOPHIL NFR BLD AUTO: 7.9 %
ERYTHROCYTE [DISTWIDTH] IN BLOOD BY AUTOMATED COUNT: 13.2 % (ref 11.5–17)
GLUCOSE SERPL-MCNC: 92 MG/DL (ref 82–115)
HCT VFR BLD AUTO: 27.5 % (ref 42–52)
HGB BLD-MCNC: 9 GM/DL (ref 14–18)
IMM GRANULOCYTES # BLD AUTO: 0.02 X10(3)/MCL (ref 0–0.02)
IMM GRANULOCYTES NFR BLD AUTO: 0.4 % (ref 0–0.43)
LYMPHOCYTES # BLD AUTO: 0.83 X10(3)/MCL (ref 0.6–4.6)
LYMPHOCYTES NFR BLD AUTO: 16 %
MCH RBC QN AUTO: 30.6 PG (ref 27–31)
MCHC RBC AUTO-ENTMCNC: 32.7 MG/DL (ref 33–36)
MCV RBC AUTO: 93.5 FL (ref 80–94)
MONOCYTES # BLD AUTO: 0.58 X10(3)/MCL (ref 0.1–1.3)
MONOCYTES NFR BLD AUTO: 11.2 %
NEUTROPHILS # BLD AUTO: 3.3 X10(3)/MCL (ref 2.1–9.2)
NEUTROPHILS NFR BLD AUTO: 63.2 %
NRBC BLD AUTO-RTO: 0 %
PLATELET # BLD AUTO: 188 X10(3)/MCL (ref 130–400)
PMV BLD AUTO: 10.4 FL (ref 9.4–12.4)
POCT GLUCOSE: 134 MG/DL (ref 70–110)
POTASSIUM SERPL-SCNC: 3.8 MMOL/L (ref 3.5–5.1)
RBC # BLD AUTO: 2.94 X10(6)/MCL (ref 4.7–6.1)
SODIUM SERPL-SCNC: 140 MMOL/L (ref 136–145)
WBC # SPEC AUTO: 5.2 X10(3)/MCL (ref 4.5–11.5)

## 2022-06-08 PROCEDURE — 25000003 PHARM REV CODE 250: Performed by: INTERNAL MEDICINE

## 2022-06-08 PROCEDURE — 25000003 PHARM REV CODE 250: Performed by: NURSE PRACTITIONER

## 2022-06-08 PROCEDURE — 80048 BASIC METABOLIC PNL TOTAL CA: CPT | Performed by: INTERNAL MEDICINE

## 2022-06-08 PROCEDURE — 36415 COLL VENOUS BLD VENIPUNCTURE: CPT | Performed by: INTERNAL MEDICINE

## 2022-06-08 PROCEDURE — 85025 COMPLETE CBC W/AUTO DIFF WBC: CPT | Performed by: INTERNAL MEDICINE

## 2022-06-08 PROCEDURE — G0378 HOSPITAL OBSERVATION PER HR: HCPCS

## 2022-06-08 RX ORDER — FINASTERIDE 5 MG/1
5 TABLET, FILM COATED ORAL DAILY
Qty: 30 TABLET | Refills: 11 | Status: SHIPPED | OUTPATIENT
Start: 2022-06-08 | End: 2022-06-21

## 2022-06-08 RX ADMIN — ALLOPURINOL 100 MG: 100 TABLET ORAL at 11:06

## 2022-06-08 RX ADMIN — FINASTERIDE 5 MG: 5 TABLET, FILM COATED ORAL at 11:06

## 2022-06-08 RX ADMIN — CITALOPRAM HYDROBROMIDE 10 MG: 10 TABLET ORAL at 11:06

## 2022-06-08 RX ADMIN — METOPROLOL TARTRATE 25 MG: 25 TABLET, FILM COATED ORAL at 11:06

## 2022-06-08 RX ADMIN — PANTOPRAZOLE SODIUM 40 MG: 40 TABLET, DELAYED RELEASE ORAL at 11:06

## 2022-06-08 RX ADMIN — ATORVASTATIN CALCIUM 20 MG: 10 TABLET, FILM COATED ORAL at 11:06

## 2022-06-08 RX ADMIN — APIXABAN 5 MG: 5 TABLET, FILM COATED ORAL at 11:06

## 2022-06-08 NOTE — DISCHARGE SUMMARY
[facility] Hospital Medicine Discharge Summary    Admit Date: 6/6/2022  Discharge Date and Time: 6/8/202211:32 AM  Admitting Physician: Hospitalist team   Discharging Physician: Ethan Ramsey MD.  Primary Care Physician: Deidra Briggs MD      Discharge Diagnoses:  Recurrent syncope with falls: suspect due to severe aortic stenosis vs. Hypotension   Severe aortic stenosis, planned for TAVR next week   Mild Acute kidney injury  Paroxysmal atrial fibrillation, on Eliquis  CAD  Essential hypertension  Dyslipidemia  Chronic anemia  Gout  GERD  BPH  Hx of prostate cancer  Vitamin D deficiency  Hypokalemia    Hospital Course:   81-year-old  male who has a past medical history of severe aortic stenosis-requiring a TAVR, atrial fibrillation, hyperlipidemia, hypertension, mitral valve stenosis who presented to the ED today with increasing frequency of syncopal episodes at home- has fallen recently and hit his head, however CT scan on Friday at [facility] ED was negative; pt not having any alterations in mental status; previously on warfarin recently placed on Eliquis.Developed a GI bleed over the last couple of weeks and his TAVR procedure had to be rescheduled. Currently TAVR is re-scheduled for next Tuesday. Patient also reports having a stomach virus a few weeks ago and was dehydrated at that time. Daughter is present at the bedside.  Patient is now ambulating freely.  No further syncopal episodes.  Cardiology evaluated and recommended no changes at this time except starting Lopressor 25 mg b.i.d..  He is scheduled to see the cardiologist tomorrow in clinic for further planning of his TAVR procedure.  We are stopping his amlodipine and benazepril as well.  Follow-up as scheduled.  His wife's at the bedside for discussion of discharge plan and they voiced agreement.    Vitals:  Blood pressure (P) 120/70, pulse 78, temperature 98.5 °F (36.9 °C), temperature source Oral, resp. rate 20,  height 6' (1.829 m), weight 96.2 kg (212 lb), SpO2 97 %..    Physical Exam:  Awake, Alert, Oriented x 3, No new focal Neurologic deficit, Normal Affect  NC/AT, PERRLA, EOMI  Supple neck, no JVD, No cervical lymphadenopathy  Symmetrical chest, Good air entry bilaterally. No rhonchi, wheezes, crackles appreciated  RRR, No gallop, rub or murmur  +ve Bowel sounds x4, Abd soft and non tender, no rebound, guarding or rigidity  No Cyanosis, cludding or edema, No new rash or bruises    Procedures Performed: No admission procedures for hospital encounter.     Significant Diagnostic Studies: See Full reports for all details  Admission on 06/06/2022   Component Date Value    Sodium Level 06/06/2022 141     Potassium Level 06/06/2022 4.0     Chloride 06/06/2022 106     Carbon Dioxide 06/06/2022 27     Glucose Level 06/06/2022 104     Blood Urea Nitrogen 06/06/2022 12.6     Creatinine 06/06/2022 1.46 (A)    Calcium Level Total 06/06/2022 9.4     Protein Total 06/06/2022 6.5     Albumin Level 06/06/2022 3.9     Globulin 06/06/2022 2.6     Albumin/Globulin Ratio 06/06/2022 1.5     Bilirubin Total 06/06/2022 0.7     Alkaline Phosphatase 06/06/2022 94     Alanine Aminotransferase 06/06/2022 33     Aspartate Aminotransfera* 06/06/2022 31     Estimated GFR-Non Ani* 06/06/2022 49     PTT 06/06/2022 43.2 (A)    PT 06/06/2022 19.8 (A)    INR 06/06/2022 1.71 (A)    Troponin-I 06/06/2022 0.015     Color, UA 06/06/2022 Dark Yellow (A)    Appearance, UA 06/06/2022 Clear     Specific Gravity, UA 06/06/2022 1.014     pH, UA 06/06/2022 7.5     Protein, UA 06/06/2022 Trace (A)    Glucose, UA 06/06/2022 Negative     Ketones, UA 06/06/2022 Negative     Blood, UA 06/06/2022 Negative     Bilirubin, UA 06/06/2022 Negative     Urobilinogen, UA 06/06/2022 1.0     Nitrites, UA 06/06/2022 Negative     Leukocyte Esterase, UA 06/06/2022 Negative     WBC 06/06/2022 5.1     RBC 06/06/2022 3.36 (A)    Hgb 06/06/2022 10.3  (A)    Hct 06/06/2022 31.4 (A)    MCV 06/06/2022 93.5     MCH 06/06/2022 30.7     MCHC 06/06/2022 32.8 (A)    RDW 06/06/2022 13.2     Platelet 06/06/2022 219     MPV 06/06/2022 10.3     Neut % 06/06/2022 66.9     Lymph % 06/06/2022 14.4     Mono % 06/06/2022 11.0     Eos % 06/06/2022 6.3     Basophil % 06/06/2022 1.0     Lymph # 06/06/2022 0.73     Neut # 06/06/2022 3.4     Mono # 06/06/2022 0.56     Eos # 06/06/2022 0.32     Baso # 06/06/2022 0.05     IG# 06/06/2022 0.02 (A)    IG% 06/06/2022 0.4     NRBC% 06/06/2022 0.0     RBC, UA 06/06/2022 <5     WBC, UA 06/06/2022 <5     Squamous Epithelial Cell* 06/06/2022 <4     Bacteria, UA 06/06/2022 None Seen     SARS COV-2 MOLECULAR 06/06/2022 Negative     LV Systolic Volume 06/06/2022 69.90     LV Diastolic Volume 06/06/2022 139.00     Sodium Level 06/07/2022 141     Potassium Level 06/07/2022 3.4 (A)    Chloride 06/07/2022 106     Carbon Dioxide 06/07/2022 28     Glucose Level 06/07/2022 96     Blood Urea Nitrogen 06/07/2022 11.0     Creatinine 06/07/2022 1.21 (A)    Calcium Level Total 06/07/2022 8.8     Protein Total 06/07/2022 5.7 (A)    Albumin Level 06/07/2022 3.4     Globulin 06/07/2022 2.3 (A)    Albumin/Globulin Ratio 06/07/2022 1.5     Bilirubin Total 06/07/2022 0.8     Alkaline Phosphatase 06/07/2022 83     Alanine Aminotransferase 06/07/2022 28     Aspartate Aminotransfera* 06/07/2022 23     Estimated GFR-Non Ani* 06/07/2022 >60     Phosphorus Level 06/07/2022 3.6     Magnesium Level 06/07/2022 2.20     WBC 06/07/2022 5.1     RBC 06/07/2022 3.00 (A)    Hgb 06/07/2022 9.2 (A)    Hct 06/07/2022 27.9 (A)    MCV 06/07/2022 93.0     MCH 06/07/2022 30.7     MCHC 06/07/2022 33.0     RDW 06/07/2022 13.2     Platelet 06/07/2022 197     MPV 06/07/2022 10.1     Neut % 06/07/2022 66.4     Lymph % 06/07/2022 13.3     Mono % 06/07/2022 11.1     Eos % 06/07/2022 7.8     Basophil % 06/07/2022 1.2     Lymph #  06/07/2022 0.68     Neut # 06/07/2022 3.4     Mono # 06/07/2022 0.57     Eos # 06/07/2022 0.40     Baso # 06/07/2022 0.06     IG# 06/07/2022 0.01     IG% 06/07/2022 0.2     NRBC% 06/07/2022 0.0     Sodium Level 06/08/2022 140     Potassium Level 06/08/2022 3.8     Chloride 06/08/2022 106     Carbon Dioxide 06/08/2022 26     Glucose Level 06/08/2022 92     Blood Urea Nitrogen 06/08/2022 16.7     Creatinine 06/08/2022 1.45 (A)    BUN/Creatinine Ratio 06/08/2022 12     Calcium Level Total 06/08/2022 9.1     Estimated GFR-Non Ani* 06/08/2022 50     Anion Gap 06/08/2022 8.0     WBC 06/08/2022 5.2     RBC 06/08/2022 2.94 (A)    Hgb 06/08/2022 9.0 (A)    Hct 06/08/2022 27.5 (A)    MCV 06/08/2022 93.5     MCH 06/08/2022 30.6     MCHC 06/08/2022 32.7 (A)    RDW 06/08/2022 13.2     Platelet 06/08/2022 188     MPV 06/08/2022 10.4     Neut % 06/08/2022 63.2     Lymph % 06/08/2022 16.0     Mono % 06/08/2022 11.2     Eos % 06/08/2022 7.9     Basophil % 06/08/2022 1.3     Lymph # 06/08/2022 0.83     Neut # 06/08/2022 3.3     Mono # 06/08/2022 0.58     Eos # 06/08/2022 0.41     Baso # 06/08/2022 0.07     IG# 06/08/2022 0.02 (A)    IG% 06/08/2022 0.4     NRBC% 06/08/2022 0.0         Microbiology Results (last 7 days)     ** No results found for the last 168 hours. **           X-Ray Chest 1 View    Result Date: 6/6/2022  EXAMINATION: XR CHEST 1 VIEW CLINICAL HISTORY: Weakness TECHNIQUE: Frontal view of the chest. COMPARISON: Chest radiographs 05/23/2022. FINDINGS: The cardiomediastinal silhouette is normal in size and contour. Pulmonary vascularity is within normal limits. The lungs are well-inflated and are without focal consolidation, pleural effusion, or pneumothorax. The imaged osseous structures and soft tissues are without acute abnormality.     No acute cardiopulmonary process. Electronically signed by: Chidi Reyes Date:    06/06/2022 Time:    10:24    X-Ray Chest AP  Portable    Result Date: 5/23/2022  EXAMINATION: XR CHEST AP PORTABLE CLINICAL HISTORY: syncope; TECHNIQUE: Single view of the chest COMPARISON: No prior imaging available for comparison. FINDINGS: No focal opacification, pleural effusion, or pneumothorax. The cardiomediastinal silhouette is within normal limits. No acute osseous abnormality.     No acute cardiopulmonary process. Electronically signed by: Sandeep Palencia Date:    05/23/2022 Time:    09:55    Echo    Result Date: 6/7/2022  Limited echo to reassess EF. Previous echo stated 50% EF. The current EF is still 45% with atrial fibrillation.    Echo    Result Date: 5/23/2022  · Mild concentric hypertrophy and low normal systolic function. · The estimated ejection fraction is 50%. · There is severe aortic valve stenosis. Aortic valve area is 0.6 cm2; peak velocity is 4.94 m/s; mean gradient is 62 mmHg. · Moderate-to-severe mitral regurgitation. · Mild tricuspid regurgitation. The estimated PA systolic pressure is 38 mmHg. · Aortic valve area is 0.45 cm2; peak velocity is 4.94 m/s; mean gradient is 62 mmHg.      CV Ultrasound Bilateral Doppler Carotid    Result Date: 5/23/2022  The right internal carotid artery demonstrated less than 50% stenosis. The left internal carotid artery demonstrated less than 50% stenosis. The bilateral vertebral arteries were patent with antegrade flow.   - pulls last radiology orders        Medication List      START taking these medications    finasteride 5 mg tablet  Commonly known as: PROSCAR  Take 1 tablet (5 mg total) by mouth once daily.     metoprolol tartrate 25 MG tablet  Commonly known as: LOPRESSOR  Take 1 tablet (25 mg total) by mouth 2 (two) times daily.        CONTINUE taking these medications    allopurinoL 100 MG tablet  Commonly known as: ZYLOPRIM     apixaban 5 mg Tab  Commonly known as: ELIQUIS  Take 1 tablet (5 mg total) by mouth 2 (two) times daily.     atorvastatin 20 MG tablet  Commonly known as: LIPITOR      citalopram 10 MG tablet  Commonly known as: CeleXA     pantoprazole 40 MG tablet  Commonly known as: PROTONIX  Take 1 tablet (40 mg total) by mouth 2 (two) times daily.     vitamin D 1000 units Tab  Commonly known as: VITAMIN D3        STOP taking these medications    amLODIPine 10 MG tablet  Commonly known as: NORVASC     benazepriL 20 MG tablet  Commonly known as: LOTENSIN     furosemide 20 MG tablet  Commonly known as: LASIX     tamsulosin 0.4 mg Cap  Commonly known as: FLOMAX     warfarin 2.5 MG tablet  Commonly known as: COUMADIN           Where to Get Your Medications      These medications were sent to [facility] [address]  [address] [facility] [address]    Phone: [phone]   · finasteride 5 mg tablet  · metoprolol tartrate 25 MG tablet          Explained in detail to the patient about the discharge plan, medications, and follow-up visits. Pt understands and agrees with the treatment plan  Discharged Condition: stable  Diet:cardiac  Disposition: home    Medications Per [place] med rec  Activities as tolerated  Follow up with your PCP in 2 wks   Cardiology tomorrow for TAVR w/u  For further questions contact hospitalist office    Discharge time 33 minutes    For worsening symptoms, chest pain, shortness of breath, increased abdominal pain, high grade fever, stroke or stroke like symptoms, immediately go to the nearest Emergency Room or call 911 as soon as possible.      Ethan Trevino M.D, on 6/8/2022. at 11:32 AM.

## 2022-06-08 NOTE — PLAN OF CARE
Problem: Adult Inpatient Plan of Care  Goal: Plan of Care Review  Outcome: Ongoing, Progressing  Goal: Patient-Specific Goal (Individualized)  Outcome: Ongoing, Progressing  Goal: Absence of Hospital-Acquired Illness or Injury  Outcome: Ongoing, Progressing  Goal: Optimal Comfort and Wellbeing  Outcome: Ongoing, Progressing  Goal: Readiness for Transition of Care  Outcome: Ongoing, Progressing     Problem: Pain Acute  Goal: Acceptable Pain Control and Functional Ability  Outcome: Ongoing, Progressing     Problem: Fall Injury Risk  Goal: Absence of Fall and Fall-Related Injury  Outcome: Ongoing, Progressing

## 2022-06-08 NOTE — CLINICAL REVIEW
81-year-old male admitted on 6/6/2022 syncope.  Previous history includes atrial fibrillation, aortic valve stenosis, hypertension, hyperlipidemia.  The patient was also having some melanotic stool.  1 week prior, he was found to be anemic and received 2 units of packed red blood cells.  The patient did have an EGD which showed no active bleeding.  It did demonstrate a 6 cm hiatal hernia, LA grade D reflux esophagitis, gastric erosions with no stigmata of recent bleeding.  Since then, the patient has had multiple syncopal episodes.  Cardiology were consulted who deemed that the syncope was related to orthostatic hypotension as well as possible medications as his Flomax was a contributed.  The patient is scheduled to undergo transcatheter aortic valve replacement on 6/14/2022.  As of 6/7/2022, the patient was no longer orthostatic positive.  Labs were unremarkable.  Vital signs were stable.  Given the lack of hemodynamic instability, acute coronary syndrome, seizure, hyperglycemia, hypoglycemia, pulmonary embolism, heart failure, the patient is appropriate for an observation setting    MD ULYSSES  , Physician Advisor

## 2022-06-08 NOTE — NURSING
Per Dr. Miguel Angel Pierce, the patient its clear for discharge. Will verify once the consultation note is visible.

## 2022-06-10 DIAGNOSIS — I35.0 SEVERE AORTIC STENOSIS: Primary | ICD-10-CM

## 2022-06-13 ENCOUNTER — ANESTHESIA EVENT (OUTPATIENT)
Dept: CARDIOLOGY | Facility: HOSPITAL | Age: 82
DRG: 267 | End: 2022-06-13
Payer: MEDICARE

## 2022-06-13 ENCOUNTER — HOSPITAL ENCOUNTER (OUTPATIENT)
Dept: RADIOLOGY | Facility: HOSPITAL | Age: 82
Discharge: HOME | DRG: 267 | End: 2022-06-13
Attending: INTERNAL MEDICINE
Payer: MEDICARE

## 2022-06-13 DIAGNOSIS — I35.0 NODULAR CALCIFIC AORTIC VALVE STENOSIS: ICD-10-CM

## 2022-06-13 DIAGNOSIS — Z01.818 PRE-OP TESTING: ICD-10-CM

## 2022-06-13 PROCEDURE — 71046 X-RAY EXAM CHEST 2 VIEWS: CPT | Mod: TC

## 2022-06-13 PROCEDURE — 93005 ELECTROCARDIOGRAM TRACING: CPT

## 2022-06-13 PROCEDURE — 86920 COMPATIBILITY TEST SPIN: CPT | Performed by: INTERNAL MEDICINE

## 2022-06-13 NOTE — PROGRESS NOTES
Instructed patient on proper use of chlorhexidine for bathing prior to procedure. Dgt is with patient and verbalizes understanding

## 2022-06-14 ENCOUNTER — HOSPITAL ENCOUNTER (INPATIENT)
Facility: HOSPITAL | Age: 82
LOS: 1 days | Discharge: HOME | DRG: 267 | End: 2022-06-15
Attending: INTERNAL MEDICINE | Admitting: INTERNAL MEDICINE
Payer: MEDICARE

## 2022-06-14 ENCOUNTER — ANESTHESIA (OUTPATIENT)
Dept: CARDIOLOGY | Facility: HOSPITAL | Age: 82
DRG: 267 | End: 2022-06-14
Payer: MEDICARE

## 2022-06-14 DIAGNOSIS — Z95.2 S/P TAVR (TRANSCATHETER AORTIC VALVE REPLACEMENT): ICD-10-CM

## 2022-06-14 DIAGNOSIS — I35.0 AS (AORTIC STENOSIS): ICD-10-CM

## 2022-06-14 DIAGNOSIS — I35.0 AORTIC STENOSIS: ICD-10-CM

## 2022-06-14 DIAGNOSIS — I35.0 SEVERE AORTIC STENOSIS: ICD-10-CM

## 2022-06-14 DIAGNOSIS — Z01.818 PRE-OP TESTING: ICD-10-CM

## 2022-06-14 LAB
BSA FOR ECHO PROCEDURE: 2.24 M2
POC ACTIVATED CLOTTING TIME K: 126 SEC (ref 74–137)
SAMPLE: NORMAL

## 2022-06-14 PROCEDURE — C1883 ADAPT/EXT, PACING/NEURO LEAD: HCPCS | Performed by: INTERNAL MEDICINE

## 2022-06-14 PROCEDURE — 25000003 PHARM REV CODE 250: Performed by: INTERNAL MEDICINE

## 2022-06-14 PROCEDURE — 63600175 PHARM REV CODE 636 W HCPCS: Performed by: ANESTHESIOLOGY

## 2022-06-14 PROCEDURE — 27800903 OPTIME MED/SURG SUP & DEVICES OTHER IMPLANTS: Performed by: INTERNAL MEDICINE

## 2022-06-14 PROCEDURE — C1769 GUIDE WIRE: HCPCS | Performed by: INTERNAL MEDICINE

## 2022-06-14 PROCEDURE — 25000003 PHARM REV CODE 250: Performed by: NURSE ANESTHETIST, CERTIFIED REGISTERED

## 2022-06-14 PROCEDURE — 63600175 PHARM REV CODE 636 W HCPCS: Performed by: INTERNAL MEDICINE

## 2022-06-14 PROCEDURE — 37000008 HC ANESTHESIA 1ST 15 MINUTES: Performed by: INTERNAL MEDICINE

## 2022-06-14 PROCEDURE — C1894 INTRO/SHEATH, NON-LASER: HCPCS | Performed by: INTERNAL MEDICINE

## 2022-06-14 PROCEDURE — 63600175 PHARM REV CODE 636 W HCPCS

## 2022-06-14 PROCEDURE — 93005 ELECTROCARDIOGRAM TRACING: CPT

## 2022-06-14 PROCEDURE — 27201423 OPTIME MED/SURG SUP & DEVICES STERILE SUPPLY: Performed by: INTERNAL MEDICINE

## 2022-06-14 PROCEDURE — 33361 PR TAVR, PERCUTANEOUS FEMORAL: ICD-10-PCS | Mod: 62,Q0,, | Performed by: THORACIC SURGERY (CARDIOTHORACIC VASCULAR SURGERY)

## 2022-06-14 PROCEDURE — 33361 REPLACE AORTIC VALVE PERQ: CPT | Mod: Q0 | Performed by: INTERNAL MEDICINE

## 2022-06-14 PROCEDURE — 63600175 PHARM REV CODE 636 W HCPCS: Performed by: NURSE ANESTHETIST, CERTIFIED REGISTERED

## 2022-06-14 PROCEDURE — 11000001 HC ACUTE MED/SURG PRIVATE ROOM

## 2022-06-14 PROCEDURE — 37000009 HC ANESTHESIA EA ADD 15 MINS: Performed by: INTERNAL MEDICINE

## 2022-06-14 PROCEDURE — 33361 REPLACE AORTIC VALVE PERQ: CPT | Mod: 62,Q0,, | Performed by: THORACIC SURGERY (CARDIOTHORACIC VASCULAR SURGERY)

## 2022-06-14 PROCEDURE — 25500020 PHARM REV CODE 255: Performed by: INTERNAL MEDICINE

## 2022-06-14 DEVICE — EDWARDS SAPIEN 3 ULTRA TRANSCATHETER HEART VALVE
Type: IMPLANTABLE DEVICE | Site: HEART | Status: FUNCTIONAL
Brand: EDWARDS SAPIEN 3 ULTRA TRANSCATHETER HEART VALVE

## 2022-06-14 DEVICE — 18F MANTA VASCULAR CLOSURE DEVICE
Type: IMPLANTABLE DEVICE | Site: GROIN | Status: FUNCTIONAL
Brand: MANTA

## 2022-06-14 RX ORDER — NITROGLYCERIN 5 MG/ML
INJECTION, SOLUTION INTRAVENOUS
Status: DISCONTINUED | OUTPATIENT
Start: 2022-06-14 | End: 2022-06-15 | Stop reason: HOSPADM

## 2022-06-14 RX ORDER — ONDANSETRON 2 MG/ML
4 INJECTION INTRAMUSCULAR; INTRAVENOUS ONCE AS NEEDED
Status: DISCONTINUED | OUTPATIENT
Start: 2022-06-14 | End: 2022-06-15 | Stop reason: HOSPADM

## 2022-06-14 RX ORDER — SODIUM CHLORIDE, SODIUM GLUCONATE, SODIUM ACETATE, POTASSIUM CHLORIDE AND MAGNESIUM CHLORIDE 30; 37; 368; 526; 502 MG/100ML; MG/100ML; MG/100ML; MG/100ML; MG/100ML
INJECTION, SOLUTION INTRAVENOUS
Status: DISCONTINUED | OUTPATIENT
Start: 2022-06-14 | End: 2022-06-14

## 2022-06-14 RX ORDER — HEPARIN SODIUM 1000 [USP'U]/ML
INJECTION, SOLUTION INTRAVENOUS; SUBCUTANEOUS
Status: DISCONTINUED | OUTPATIENT
Start: 2022-06-14 | End: 2022-06-14

## 2022-06-14 RX ORDER — KETOROLAC TROMETHAMINE 30 MG/ML
INJECTION, SOLUTION INTRAMUSCULAR; INTRAVENOUS
Status: COMPLETED
Start: 2022-06-14 | End: 2022-06-14

## 2022-06-14 RX ORDER — VERAPAMIL HYDROCHLORIDE 2.5 MG/ML
INJECTION, SOLUTION INTRAVENOUS
Status: DISCONTINUED | OUTPATIENT
Start: 2022-06-14 | End: 2022-06-15 | Stop reason: HOSPADM

## 2022-06-14 RX ORDER — HYDRALAZINE HYDROCHLORIDE 20 MG/ML
INJECTION INTRAMUSCULAR; INTRAVENOUS
Status: COMPLETED
Start: 2022-06-14 | End: 2022-06-14

## 2022-06-14 RX ORDER — ACETAMINOPHEN 10 MG/ML
1000 INJECTION, SOLUTION INTRAVENOUS EVERY 8 HOURS
Status: DISCONTINUED | OUTPATIENT
Start: 2022-06-14 | End: 2022-06-15 | Stop reason: HOSPADM

## 2022-06-14 RX ORDER — MUPIROCIN 20 MG/G
OINTMENT TOPICAL
Status: DISCONTINUED | OUTPATIENT
Start: 2022-06-14 | End: 2022-06-15 | Stop reason: HOSPADM

## 2022-06-14 RX ORDER — METHOCARBAMOL 100 MG/ML
1000 INJECTION, SOLUTION INTRAMUSCULAR; INTRAVENOUS ONCE
Status: COMPLETED | OUTPATIENT
Start: 2022-06-14 | End: 2022-06-14

## 2022-06-14 RX ORDER — HYDROMORPHONE HYDROCHLORIDE 2 MG/ML
INJECTION, SOLUTION INTRAMUSCULAR; INTRAVENOUS; SUBCUTANEOUS
Status: COMPLETED
Start: 2022-06-14 | End: 2022-06-14

## 2022-06-14 RX ORDER — ONDANSETRON 2 MG/ML
INJECTION INTRAMUSCULAR; INTRAVENOUS
Status: DISCONTINUED | OUTPATIENT
Start: 2022-06-14 | End: 2022-06-14

## 2022-06-14 RX ORDER — LIDOCAINE HYDROCHLORIDE 20 MG/ML
INJECTION, SOLUTION EPIDURAL; INFILTRATION; INTRACAUDAL; PERINEURAL
Status: DISCONTINUED | OUTPATIENT
Start: 2022-06-14 | End: 2022-06-14

## 2022-06-14 RX ORDER — HYDROMORPHONE HYDROCHLORIDE 2 MG/ML
0.5 INJECTION, SOLUTION INTRAMUSCULAR; INTRAVENOUS; SUBCUTANEOUS EVERY 5 MIN PRN
Status: DISCONTINUED | OUTPATIENT
Start: 2022-06-14 | End: 2022-06-15 | Stop reason: HOSPADM

## 2022-06-14 RX ORDER — PROTAMINE SULFATE 10 MG/ML
INJECTION, SOLUTION INTRAVENOUS
Status: DISCONTINUED | OUTPATIENT
Start: 2022-06-14 | End: 2022-06-14

## 2022-06-14 RX ORDER — PANTOPRAZOLE SODIUM 40 MG/1
40 TABLET, DELAYED RELEASE ORAL
Status: DISCONTINUED | OUTPATIENT
Start: 2022-06-14 | End: 2022-06-15 | Stop reason: HOSPADM

## 2022-06-14 RX ORDER — ONDANSETRON 4 MG/1
8 TABLET, ORALLY DISINTEGRATING ORAL EVERY 8 HOURS PRN
Status: DISCONTINUED | OUTPATIENT
Start: 2022-06-14 | End: 2022-06-15 | Stop reason: HOSPADM

## 2022-06-14 RX ORDER — METOPROLOL TARTRATE 1 MG/ML
5 INJECTION, SOLUTION INTRAVENOUS EVERY 5 MIN PRN
Status: DISCONTINUED | OUTPATIENT
Start: 2022-06-14 | End: 2022-06-15 | Stop reason: HOSPADM

## 2022-06-14 RX ORDER — CHOLECALCIFEROL (VITAMIN D3) 25 MCG
1000 TABLET ORAL NIGHTLY
Status: DISCONTINUED | OUTPATIENT
Start: 2022-06-14 | End: 2022-06-15 | Stop reason: HOSPADM

## 2022-06-14 RX ORDER — LIDOCAINE HYDROCHLORIDE 10 MG/ML
INJECTION, SOLUTION EPIDURAL; INFILTRATION; INTRACAUDAL; PERINEURAL
Status: DISCONTINUED | OUTPATIENT
Start: 2022-06-14 | End: 2022-06-15 | Stop reason: HOSPADM

## 2022-06-14 RX ORDER — TAMSULOSIN HYDROCHLORIDE 0.4 MG/1
0.4 CAPSULE ORAL NIGHTLY
Status: DISCONTINUED | OUTPATIENT
Start: 2022-06-14 | End: 2022-06-15 | Stop reason: HOSPADM

## 2022-06-14 RX ORDER — MORPHINE SULFATE 10 MG/ML
4 INJECTION INTRAMUSCULAR; INTRAVENOUS; SUBCUTANEOUS EVERY 5 MIN PRN
Status: DISCONTINUED | OUTPATIENT
Start: 2022-06-14 | End: 2022-06-15 | Stop reason: HOSPADM

## 2022-06-14 RX ORDER — PROPOFOL 10 MG/ML
VIAL (ML) INTRAVENOUS CONTINUOUS PRN
Status: DISCONTINUED | OUTPATIENT
Start: 2022-06-14 | End: 2022-06-14

## 2022-06-14 RX ORDER — HYDRALAZINE HYDROCHLORIDE 20 MG/ML
10 INJECTION INTRAMUSCULAR; INTRAVENOUS
Status: DISCONTINUED | OUTPATIENT
Start: 2022-06-14 | End: 2022-06-15 | Stop reason: HOSPADM

## 2022-06-14 RX ORDER — ACETAMINOPHEN 325 MG/1
650 TABLET ORAL EVERY 4 HOURS PRN
Status: DISCONTINUED | OUTPATIENT
Start: 2022-06-14 | End: 2022-06-15 | Stop reason: HOSPADM

## 2022-06-14 RX ORDER — KETOROLAC TROMETHAMINE 30 MG/ML
15 INJECTION, SOLUTION INTRAMUSCULAR; INTRAVENOUS EVERY 6 HOURS PRN
Status: DISCONTINUED | OUTPATIENT
Start: 2022-06-14 | End: 2022-06-15 | Stop reason: HOSPADM

## 2022-06-14 RX ORDER — METOPROLOL TARTRATE 25 MG/1
25 TABLET, FILM COATED ORAL 2 TIMES DAILY
Status: DISCONTINUED | OUTPATIENT
Start: 2022-06-14 | End: 2022-06-15 | Stop reason: HOSPADM

## 2022-06-14 RX ADMIN — Medication 1000 UNITS: at 08:06

## 2022-06-14 RX ADMIN — SODIUM CHLORIDE, SODIUM GLUCONATE, SODIUM ACETATE, POTASSIUM CHLORIDE AND MAGNESIUM CHLORIDE 450 ML: 526; 502; 368; 37; 30 INJECTION, SOLUTION INTRAVENOUS at 10:06

## 2022-06-14 RX ADMIN — Medication 1000 UNITS: at 06:06

## 2022-06-14 RX ADMIN — KETOROLAC TROMETHAMINE 15 MG: 30 INJECTION, SOLUTION INTRAMUSCULAR; INTRAVENOUS at 01:06

## 2022-06-14 RX ADMIN — METOPROLOL TARTRATE 25 MG: 25 TABLET, FILM COATED ORAL at 06:06

## 2022-06-14 RX ADMIN — PROPOFOL 50 MCG/KG/MIN: 10 INJECTION, EMULSION INTRAVENOUS at 09:06

## 2022-06-14 RX ADMIN — TAMSULOSIN HYDROCHLORIDE 0.4 MG: 0.4 CAPSULE ORAL at 06:06

## 2022-06-14 RX ADMIN — TAMSULOSIN HYDROCHLORIDE 0.4 MG: 0.4 CAPSULE ORAL at 08:06

## 2022-06-14 RX ADMIN — LIDOCAINE HYDROCHLORIDE 3 ML: 20 INJECTION, SOLUTION EPIDURAL; INFILTRATION; INTRACAUDAL; PERINEURAL at 09:06

## 2022-06-14 RX ADMIN — HEPARIN SODIUM 10000 UNITS: 1000 INJECTION, SOLUTION INTRAVENOUS; SUBCUTANEOUS at 09:06

## 2022-06-14 RX ADMIN — METHOCARBAMOL 1000 MG: 100 INJECTION, SOLUTION INTRAMUSCULAR; INTRAVENOUS at 01:06

## 2022-06-14 RX ADMIN — HYDROMORPHONE HYDROCHLORIDE 0.5 MG: 2 INJECTION, SOLUTION INTRAMUSCULAR; INTRAVENOUS; SUBCUTANEOUS at 12:06

## 2022-06-14 RX ADMIN — HYDRALAZINE HYDROCHLORIDE 10 MG: 20 INJECTION INTRAMUSCULAR; INTRAVENOUS at 01:06

## 2022-06-14 RX ADMIN — MUPIROCIN: 20 OINTMENT TOPICAL at 08:06

## 2022-06-14 RX ADMIN — PROTAMINE SULFATE 100 MG: 10 INJECTION, SOLUTION INTRAVENOUS at 10:06

## 2022-06-14 RX ADMIN — ONDANSETRON 4 MG: 2 INJECTION INTRAMUSCULAR; INTRAVENOUS at 09:06

## 2022-06-14 RX ADMIN — PANTOPRAZOLE SODIUM 40 MG: 40 TABLET, DELAYED RELEASE ORAL at 06:06

## 2022-06-14 RX ADMIN — DEXTROSE MONOHYDRATE 1 G: 5 INJECTION INTRAVENOUS at 09:06

## 2022-06-14 NOTE — TRANSFER OF CARE
Anesthesia Transfer of Care Note    Patient: Geo Barrow    Procedure(s) Performed: Procedure(s) (LRB):  REPLACEMENT, AORTIC VALVE, TRANSCATHETER (N/A)  AORTOGRAM, ABDOMINAL (N/A)    Patient location: PACU    Anesthesia Type: general    Transport from OR: Transported from OR on room air with adequate spontaneous ventilation    Post pain: adequate analgesia    Post assessment: no apparent anesthetic complications    Post vital signs: stable    Level of consciousness: awake and sedated    Nausea/Vomiting: no nausea/vomiting    Complications: none    Transfer of care protocol was followed      Last vitals:   Visit Vitals  BP (!) 153/83   Pulse 76   Temp 36.7 °C (98.1 °F) (Oral)   Ht 6' (1.829 m)   Wt 99.1 kg (218 lb 7.6 oz)   SpO2 98%   BMI 29.63 kg/m²

## 2022-06-14 NOTE — Clinical Note
The transvenous pacing lead was inserted into the RV and was placed and capture was confirmed. The pacer was paced at 50 BPM 10 mA 0.2 mV.

## 2022-06-14 NOTE — TRANSFER OF CARE
Anesthesia Transfer of Care Note    Patient: Geo Barrow    Procedure(s) Performed: Procedure(s) (LRB):  REPLACEMENT, AORTIC VALVE, TRANSCATHETER (N/A)  AORTOGRAM, ABDOMINAL (N/A)    Anesthesia PACU Handoff    Last vitals:   Visit Vitals  BP (!) 153/83   Pulse 76   Temp 36.7 °C (98.1 °F) (Oral)   Ht 6' (1.829 m)   Wt 99.1 kg (218 lb 7.6 oz)   SpO2 98%   BMI 29.63 kg/m²

## 2022-06-14 NOTE — Clinical Note
The catheter was inserted into the left ventricle. Hemodynamics were performed.  Used stiff short straight glidewire to cross aortic valve, exchanged for 0.035x260 safari wire

## 2022-06-14 NOTE — ANESTHESIA PREPROCEDURE EVALUATION
06/14/2022  Geo Barrow is a 81 y.o., male w/ severe, symptomatic AS, who presents for a  REPLACEMENT, AORTIC VALVE, TRANSCATHETER (N/A Chest) - TAVR.    MANOHAR:   -  LVEF 55%   - Severe AS, mean gradient 42,  MAREN 0.8   - 2+ MR, TR    Eliquis held 3 days  Pre-op Assessment    I have reviewed the Patient Summary Reports.    I have reviewed the NPO Status.   I have reviewed the Medications.     Review of Systems  Anesthesia Hx:  No problems with previous Anesthesia    Cardiovascular:   Hypertension CAD   hyperlipidemia  Functional Capacity low / < 4 METS     Severe AS  2+ MR, TR  Carotoid Artery Disease, bilateral , Left stenosis is Nonobstructive% , Right stenosis is  Nonobstructive% Disorder of Cardiac Rhythm, Atrial Fibrillation, Premature Ventricular Contraction (PVC), occasional    Pulmonary:  Pulmonary Normal    Renal/:   CKD,  Cr 1.27   Neurological:   Near Syncope Episodes recently       Physical Exam  General: Alert and Oriented    Airway:  Mallampati: II   Mouth Opening: Normal  TM Distance: Normal  Neck ROM: Normal ROM    Dental:  Intact    Chest/Lungs:  Normal Respiratory Rate    Heart:  Rate: Normal  Rhythm: Irregularly Irregular     EKG:    CBC:  Lab Results   Component Value Date    WBC 5.7 06/13/2022    RBC 3.52 (L) 06/13/2022    HGB 10.4 (L) 06/13/2022    HCT 31.2 (L) 06/13/2022       CMP:   Lab Results   Component Value Date    CHLORIDE 105 06/13/2022    CO2 28 06/13/2022    BUN 17.8 06/13/2022    CREATININE 1.27 (H) 06/13/2022    GLUCOSE 104 06/13/2022    CALCIUM 9.5 06/13/2022       INR:  Lab Results   Component Value Date    INR 1.33 (H) 06/13/2022    PROTIME 16.3 (H) 06/13/2022       Anesthesia Plan  Type of Anesthesia, risks & benefits discussed:    Anesthesia Type: MAC  Intra-op Monitoring Plan: Standard ASA Monitors and Art Line  Post Op Pain Control Plan: IV/PO Opioids PRN  Induction:   IV  Informed Consent: Informed consent signed with the Patient and all parties understand the risks and agree with anesthesia plan.  All questions answered. Patient consented to blood products? Yes  ASA Score: 4  Day of Surgery Review of History & Physical: H&P Update referred to the surgeon/provider.  Anesthesia Plan Notes:  - Possible conversion to GETA intraop, if necessary   - Vasopressors , as needed for Perfusion Support    Ready For Surgery From Anesthesia Perspective.     .

## 2022-06-14 NOTE — ANESTHESIA POSTPROCEDURE EVALUATION
Anesthesia Post Evaluation    Patient: Geo Barrow    Procedure(s) Performed: Procedure(s) (LRB):  REPLACEMENT, AORTIC VALVE, TRANSCATHETER (N/A)  AORTOGRAM, ABDOMINAL (N/A)    Final Anesthesia Type: general      Patient location during evaluation: PACU  Patient participation: Yes- Able to Participate  Level of consciousness: oriented and awake  Post-procedure vital signs: reviewed and stable  Pain management: adequate  Airway patency: patent    PONV status at discharge: No PONV  Anesthetic complications: no      Cardiovascular status: blood pressure returned to baseline  Respiratory status: spontaneous ventilation and unassisted  Hydration status: euvolemic  Follow-up not needed.  Comments: [facility]          Vitals Value Taken Time   /76 06/14/22 1351   Temp 36 06/14/22 1354   Pulse 75 06/14/22 1354   Resp 15 06/14/22 1354   SpO2 93 % 06/14/22 1354   Vitals shown include unvalidated device data.      No case tracking events are documented in the log.      Pain/Julee Score: No data recorded

## 2022-06-14 NOTE — Clinical Note
The catheter was inserted into the aorta. Hemodynamics were performed.  An angiography was performed of the aorta. The angiography was performed via power injection.  Aortic root angio

## 2022-06-14 NOTE — OP NOTE
[facility]                       [address] [address]    PATIENT NAME:      BYRON KAPADIA   YOB: 1940  CSN:               [id number]  MRN:               [id number]  ADMIT DATE:        06/14/2022 07:15:00  PHYSICIAN:         Bernard Michelle MD                          OPERATIVE REPORT      DATE OF SURGERY:    06/14/2022 00:00:00    SURGEON:  Bernard Michelle MD    PREOPERATIVE DIAGNOSES:    1. Critical aortic stenosis.  2. Chronic atrial fibrillation.    POSTOPERATIVE DIAGNOSES:    1. Critical aortic stenosis.  2. Chronic atrial fibrillation.    PROCEDURES PERFORMED:    1. Transcatheter aortic valve replacement with #26 Jacki S3 ultra valve via the   right common femoral artery.  2. Right radial access.  3. Manta vascular closure.    CARDIOLOGIST:  Dr. Fragoso.    ASSISTANT:  Deuce Urena PA-C.    INDICATIONS:  The patient is an 81-year-old male who has had several syncopal   attacks and over the past several months has become very short of breath with   exertion.  He has been diagnosed with critical aortic stenosis.  He is known to   have longstanding atrial fibrillation.  His echocardiographic and physiologic   data indicate he would be a very good candidate for transcatheter solution.    DESCRIPTION OF PROCEDURE:  The patient is brought to the operating room in the   cath lab and placed in the supine position.  Intravenous sedation is   administered.  Both groins and the right wrist are prepped and draped in a   sterile field.  1% Xylocaine was used for local anesthesia.  Using ultrasound   guidance and micropuncture technique, the right radial artery was accessed and a   6-Argentine sheath was passed.  Ultrasound guidance and micropuncture technique   was used to access the right common femoral vein and the right common femoral   artery.  Flush angiogram showed satisfactory placement and an  8-Welsh sheath   was placed in the artery.  A long 6-Welsh sheath was placed from the femoral   vein.  The patient was systemically heparinized.  The Manta depth device was   then passed to identify its placement.  A transvenous pacemaker was passed from   the 6-Welsh sheath into the right ventricle.  When a satisfactory ACT was   achieved, a pigtail catheter was delivered to the aortic valve and our   deployment angle was identified. A dilator was passed from the right common   femoral artery followed by the sheath and dilator.  The device was passed into   the thoracic aorta after the aortic valve was crossed with a 0.035 Glidewire and   exchanged for a Safari wire.  The device was then passed across the aortic   valve.  Then under fluoroscopic control and rapid ventricular pacing, the valve   was satisfactorily deployed.  The delivery device was then retracted.  Flush   aortogram showed mild-to-moderate paravalvular leak and so 1 cc of saline was   added to the balloon.  The balloon crossed the valve again and once again under   rapid ventricular pacing the valve was post dilated.  The device was then   retracted. Surface echocardiogram was performed which showed no paravalvular   leak with a gradient of 7.  The delivery device was removed.  The sheath was   removed from the femoral artery and the Manta device was deployed satisfactorily   with excellent hemostasis.  Flush angiogram showed no extravasation.  The   temporary pacing catheter was removed as was the pigtail catheter from the right   wrist.  A TR band was placed at the right radial artery.  The patient was taken   to the recovery area.        ______________________________  MD MIGUEL Lindsay/MEJIA  DD:  06/14/2022  Time:  10:48AM  DT:  06/14/2022  Time:  11:06AM  Job #:  [id number]      OPERATIVE REPORT

## 2022-06-14 NOTE — OP NOTE
[facility] - Cath Lab Services  Cardiothoracic Surgery  Operative Note    SUMMARY     Date of Procedure: 6/14/2022     Procedure: Procedure(s) (LRB):  REPLACEMENT, AORTIC VALVE, TRANSCATHETER (N/A)  AORTOGRAM, ABDOMINAL (N/A) Turk Jacki S3 Ultra 26 mm bio via R CFA    Surgeon(s) and Role:     * Reese Fragoso MD - Primary     * Bernard Michelle MD    Assistant: Deuce Urena PA-C    Pre-Operative Diagnosis: Severe aortic stenosis [I35.0]    Post-Operative Diagnosis: Post-Op Diagnosis Codes:     * Severe aortic stenosis [I35.0]    Anesthesia: General    Operative Findings (including complications, if any):            Specimens:   Specimen (24h ago, onward)            None                  Condition: Good    Disposition: PACU - hemodynamically stable.

## 2022-06-14 NOTE — Clinical Note
The groin and right radial was prepped. The site was clipped. The patient was draped. The patient was positioned supine.

## 2022-06-15 VITALS
HEIGHT: 72 IN | OXYGEN SATURATION: 95 % | SYSTOLIC BLOOD PRESSURE: 132 MMHG | WEIGHT: 218.5 LBS | BODY MASS INDEX: 29.59 KG/M2 | TEMPERATURE: 99 F | HEART RATE: 77 BPM | RESPIRATION RATE: 18 BRPM | DIASTOLIC BLOOD PRESSURE: 80 MMHG

## 2022-06-15 PROBLEM — I35.0 SEVERE AORTIC STENOSIS: Status: ACTIVE | Noted: 2022-06-15

## 2022-06-15 LAB
ANION GAP SERPL CALC-SCNC: 12 MEQ/L
AV INDEX (PROSTH): 0.36
AV MEAN GRADIENT: 13 MMHG
AV PEAK GRADIENT: 25 MMHG
AV VALVE AREA: 1.38 CM2
AV VELOCITY RATIO: 0.4
BASOPHILS # BLD AUTO: 0.03 X10(3)/MCL (ref 0–0.2)
BASOPHILS NFR BLD AUTO: 0.4 %
BSA FOR ECHO PROCEDURE: 2.24 M2
BUN SERPL-MCNC: 23.1 MG/DL (ref 8.4–25.7)
CALCIUM SERPL-MCNC: 8.6 MG/DL (ref 8.8–10)
CHLORIDE SERPL-SCNC: 101 MMOL/L (ref 98–107)
CO2 SERPL-SCNC: 23 MMOL/L (ref 23–31)
CREAT SERPL-MCNC: 1.53 MG/DL (ref 0.73–1.18)
CREAT/UREA NIT SERPL: 15
CV ECHO LV RWT: 0.52 CM
DOP CALC AO PEAK VEL: 2.52 M/S
DOP CALC AO VTI: 38.5 CM
DOP CALC LVOT AREA: 3.8 CM2
DOP CALC LVOT DIAMETER: 2.2 CM
DOP CALC LVOT PEAK VEL: 1.01 M/S
DOP CALC LVOT STROKE VOLUME: 53.19 CM3
DOP CALC MV VTI: 36.5 CM
DOP CALCLVOT PEAK VEL VTI: 14 CM
E/E' RATIO: 18.93 M/S
ECHO LV POSTERIOR WALL: 1.2 CM (ref 0.6–1.1)
EJECTION FRACTION: 50 %
EOSINOPHIL # BLD AUTO: 0.14 X10(3)/MCL (ref 0–0.9)
EOSINOPHIL NFR BLD AUTO: 2 %
ERYTHROCYTE [DISTWIDTH] IN BLOOD BY AUTOMATED COUNT: 13.2 % (ref 11.5–17)
FRACTIONAL SHORTENING: 30 % (ref 28–44)
GLUCOSE SERPL-MCNC: 199 MG/DL (ref 82–115)
HCT VFR BLD AUTO: 30.5 % (ref 42–52)
HGB BLD-MCNC: 9.6 GM/DL (ref 14–18)
IMM GRANULOCYTES # BLD AUTO: 0.03 X10(3)/MCL (ref 0–0.02)
IMM GRANULOCYTES NFR BLD AUTO: 0.4 % (ref 0–0.43)
INTERVENTRICULAR SEPTUM: 1.36 CM (ref 0.6–1.1)
LEFT ATRIUM SIZE: 5.2 CM
LEFT ATRIUM VOLUME INDEX MOD: 74.7 ML/M2
LEFT ATRIUM VOLUME MOD: 165 CM3
LEFT INTERNAL DIMENSION IN SYSTOLE: 3.23 CM (ref 2.1–4)
LEFT VENTRICLE DIASTOLIC VOLUME INDEX: 44.71 ML/M2
LEFT VENTRICLE DIASTOLIC VOLUME: 98.8 ML
LEFT VENTRICLE MASS INDEX: 103 G/M2
LEFT VENTRICLE SYSTOLIC VOLUME INDEX: 19 ML/M2
LEFT VENTRICLE SYSTOLIC VOLUME: 41.9 ML
LEFT VENTRICULAR INTERNAL DIMENSION IN DIASTOLE: 4.63 CM (ref 3.5–6)
LEFT VENTRICULAR MASS: 227.27 G
LV LATERAL E/E' RATIO: 20.29 M/S
LV SEPTAL E/E' RATIO: 17.75 M/S
LVOT MG: 2 MMHG
LVOT MV: 0.69 CM/S
LYMPHOCYTES # BLD AUTO: 0.65 X10(3)/MCL (ref 0.6–4.6)
LYMPHOCYTES NFR BLD AUTO: 9.5 %
MCH RBC QN AUTO: 28.7 PG (ref 27–31)
MCHC RBC AUTO-ENTMCNC: 31.5 MG/DL (ref 33–36)
MCV RBC AUTO: 91.3 FL (ref 80–94)
MONOCYTES # BLD AUTO: 0.61 X10(3)/MCL (ref 0.1–1.3)
MONOCYTES NFR BLD AUTO: 8.9 %
MR PISA EROA: 0.32 CM2
MV MEAN GRADIENT: 5 MMHG
MV PEAK E VEL: 1.42 M/S
MV PEAK GRADIENT: 10 MMHG
MV VALVE AREA BY CONTINUITY EQUATION: 1.46 CM2
NEUTROPHILS # BLD AUTO: 5.4 X10(3)/MCL (ref 2.1–9.2)
NEUTROPHILS NFR BLD AUTO: 78.8 %
NRBC BLD AUTO-RTO: 0 %
PISA MRMAX VEL: 5.13 M/S
PISA RADIUS: 0.9 CM
PISA TR MAX VEL: 2.86 M/S
PISA VN NYQUIST MS: 0.32 M/S
PISA VN NYQUIST: 0.32 M/S
PLATELET # BLD AUTO: 160 X10(3)/MCL (ref 130–400)
PMV BLD AUTO: 10.5 FL (ref 9.4–12.4)
POTASSIUM SERPL-SCNC: 3.3 MMOL/L (ref 3.5–5.1)
RA PRESSURE: 3 MMHG
RBC # BLD AUTO: 3.34 X10(6)/MCL (ref 4.7–6.1)
SODIUM SERPL-SCNC: 136 MMOL/L (ref 136–145)
TDI LATERAL: 0.07 M/S
TDI SEPTAL: 0.08 M/S
TDI: 0.08 M/S
TR MAX PG: 33 MMHG
TRICUSPID ANNULAR PLANE SYSTOLIC EXCURSION: 1.86 CM
TV REST PULMONARY ARTERY PRESSURE: 36 MMHG
WBC # SPEC AUTO: 6.9 X10(3)/MCL (ref 4.5–11.5)

## 2022-06-15 PROCEDURE — 25000003 PHARM REV CODE 250: Performed by: INTERNAL MEDICINE

## 2022-06-15 PROCEDURE — 25000003 PHARM REV CODE 250: Performed by: NURSE PRACTITIONER

## 2022-06-15 PROCEDURE — 80048 BASIC METABOLIC PNL TOTAL CA: CPT | Performed by: NURSE PRACTITIONER

## 2022-06-15 PROCEDURE — 36415 COLL VENOUS BLD VENIPUNCTURE: CPT | Performed by: NURSE PRACTITIONER

## 2022-06-15 PROCEDURE — 85025 COMPLETE CBC W/AUTO DIFF WBC: CPT | Performed by: NURSE PRACTITIONER

## 2022-06-15 RX ORDER — POTASSIUM CHLORIDE 20 MEQ/1
20 TABLET, EXTENDED RELEASE ORAL ONCE
Status: COMPLETED | OUTPATIENT
Start: 2022-06-15 | End: 2022-06-15

## 2022-06-15 RX ADMIN — PANTOPRAZOLE SODIUM 40 MG: 40 TABLET, DELAYED RELEASE ORAL at 05:06

## 2022-06-15 RX ADMIN — POTASSIUM CHLORIDE 20 MEQ: 1500 TABLET, EXTENDED RELEASE ORAL at 11:06

## 2022-06-15 RX ADMIN — METOPROLOL TARTRATE 25 MG: 25 TABLET, FILM COATED ORAL at 09:06

## 2022-06-15 NOTE — DISCHARGE SUMMARY
[facility] - 6th Floor Medical Telemetry  Cardiology  Discharge Summary      Patient Name: Geo Barrow  MRN: [id number]  Admission Date: 6/14/2022  Hospital Length of Stay: 1 days  Discharge Date and Time:  06/15/2022 7:35 AM  Attending Physician: Reese Fragoso MD    Discharging Provider: MARTA Burdick  Primary Care Physician: Deidra Briggs MD    HPI:   Mr. Barrow presented for TAVR due to severe AS with syncopal episodes. Procedure as below.     Procedure(s) (LRB):  REPLACEMENT, AORTIC VALVE, TRANSCATHETER (N/A)  AORTOGRAM, ABDOMINAL (N/A)        PROCEDURES PERFORMED:    1. Transcatheter aortic valve replacement with #26 Jacki S3 ultra valve via the   right common femoral artery.  2. Right radial access.  3. Manta vascular closure.     CARDIOLOGIST:  Dr. Fragoso.     ASSISTANT:  Deuce Urena PA-C.     INDICATIONS:  The patient is an 81-year-old male who has had several syncopal attacks and over the past several months has become very short of breath with exertion.  He has been diagnosed with critical aortic stenosis.  He is known to have longstanding atrial fibrillation.  His echocardiographic and physiologic data indicate he would be a very good candidate for transcatheter solution.     DESCRIPTION OF PROCEDURE:  The patient is brought to the operating room in the cath lab and placed in the supine position.  Intravenous sedation is administered.  Both groins and the right wrist are prepped and draped in a sterile field.  1% Xylocaine was used for local anesthesia.  Using ultrasound guidance and micropuncture technique, the right radial artery was accessed and a 6-Tunisian sheath was passed.  Ultrasound guidance and micropuncture technique was used to access the right common femoral vein and the right common femoral artery.  Flush angiogram showed satisfactory placement and an 8-Tunisian sheath   was placed in the artery.  A long 6-Tunisian sheath was placed from the femoral vein.  The  patient was systemically heparinized.  The Manta depth device was then passed to identify its placement.  A transvenous pacemaker was passed from the 6-Georgian sheath into the right ventricle.  When a satisfactory ACT was achieved, a pigtail catheter was delivered to the aortic valve and our deployment angle was identified. A dilator was passed from the right common femoral artery followed by the sheath and dilator.  The device was passed into the thoracic aorta after the aortic valve was crossed with a 0.035 Glidewire and  exchanged for a Safari wire.  The device was then passed across the aortic valve.  Then under fluoroscopic control and rapid ventricular pacing, the valve was satisfactorily deployed.  The delivery device was then retracted.  Flush aortogram showed mild-to-moderate paravalvular leak and so 1 cc of saline was added to the balloon.  The balloon crossed the valve again and once again under   rapid ventricular pacing the valve was post dilated.  The device was then   retracted. Surface echocardiogram was performed which showed no paravalvular leak with a gradient of 7.  The delivery device was removed.  The sheath was removed from the femoral artery and the Manta device was deployed satisfactorily  with excellent hemostasis.  Flush angiogram showed no extravasation.  The temporary pacing catheter was removed as was the pigtail catheter from the right wrist.  A TR band was placed at the right radial artery.  The patient was taken  to the recovery area.      ______________________________  Bernard Michelle MD    TTE 06/15/2022  The left ventricle is normal in size with mild concentric hypertrophy and low normal systolic function.  The estimated ejection fraction is 50%.  A diastolic pattern consistent with atrial fibrillation observed.  There is abnormal septal wall motion consistent with left bundle branch block.  Severe left atrial enlargement.  There is a transcutaneously-placed aortic  bioprosthesis present. There is no aortic insufficiency present. Prosthetic aortic valve is normal.  The aortic valve mean gradient is 13 mmHg with a dimensionless index of 0.36.  Moderate-to-severe mitral regurgitation.  The estimated PA systolic pressure is 36 mmHg.  Normal central venous pressure (3 mmHg).       Indwelling Lines/Drains at time of discharge:  Lines/Drains/Airways       None                 Significant Diagnostic Studies: Labs:   BMP:   Recent Labs   Lab 06/15/22  0759      K 3.3*   CO2 23   BUN 23.1   CREATININE 1.53*   CALCIUM 8.6*   , CMP   Recent Labs   Lab 06/15/22  0759      K 3.3*   CO2 23   BUN 23.1   CREATININE 1.53*   CALCIUM 8.6*   EGFRNONAA 47    and CBC   Recent Labs   Lab 06/15/22  0759   WBC 6.9   HGB 9.6*   HCT 30.5*        Impression:   Severe AS  --s/p TAVR  Native CAD  Chronic Afib  L ASHLEY  CKD 3  HLD    Plan:  S/p TAVR. Obtain echo and labs.   Resume elqiuis 5mg bid. Continue metoprolol tartrate 25mg bid.     Discharged Condition: good    Disposition: Home or Self Care    Follow Up:   Follow-up Information       Reese Fragoso MD Follow up on 6/30/2022.    Specialties: Cardiovascular Disease, Cardiology  Why: 1:30  Contact information:  [address] [facility]  [facility] [address]  [phone]                           Patient Instructions:      COVID-19 Routine Screening   Standing Status: Future Number of Occurrences: 1 Standing Exp. Date: 08/12/23     Order Specific Question Answer Comments   Is the patient symptomatic? No    Is this needed for pre-procedure or pre-op testing? Yes    Diagnosis: Pre-op testing [[id number]]      Diet Cardiac     Lifting restrictions   Order Comments: No lifting > 10#s x 1 week     Notify your health care provider if you experience any of the following:  redness, tenderness, or signs of infection (pain, swelling, redness, odor or green/yellow discharge around incision site)     No  dressing needed     Activity as tolerated     Shower on day dressing removed (No bath)   Order Comments: Showers only x 1 week     Medications:  Reconciled Home Medications:      Medication List        CONTINUE taking these medications      allopurinoL 100 MG tablet  Commonly known as: ZYLOPRIM  Take 1 tablet by mouth once daily at 6am.     apixaban 5 mg Tab  Commonly known as: ELIQUIS  Take 1 tablet (5 mg total) by mouth 2 (two) times daily.     atorvastatin 20 MG tablet  Commonly known as: LIPITOR  Take 1 tablet by mouth once daily at 6am.     citalopram 10 MG tablet  Commonly known as: CeleXA  Take 1 tablet by mouth once daily at 6am.     finasteride 5 mg tablet  Commonly known as: PROSCAR  Take 1 tablet (5 mg total) by mouth once daily.     metoprolol tartrate 25 MG tablet  Commonly known as: LOPRESSOR  Take 1 tablet (25 mg total) by mouth 2 (two) times daily.     pantoprazole 40 MG tablet  Commonly known as: PROTONIX  Take 1 tablet (40 mg total) by mouth 2 (two) times daily.     vitamin D 1000 units Tab  Commonly known as: VITAMIN D3  Take 1,000 Units by mouth once daily. nightly            STOP taking these medications      amLODIPine 10 MG tablet  Commonly known as: NORVASC     benazepriL 20 MG tablet  Commonly known as: LOTENSIN     furosemide 20 MG tablet  Commonly known as: LASIX     tamsulosin 0.4 mg Cap  Commonly known as: FLOMAX     warfarin 2.5 MG tablet  Commonly known as: COUMADIN              Time spent on the discharge of patient: 30 minutes    MARTA Burdick  Cardiology  [facility] - 6th Floor Medical Telemetry    Pt. Seen and examined by me. Above assesment and plan is formulated under my supervision.

## 2022-06-16 ENCOUNTER — PATIENT OUTREACH (OUTPATIENT)
Dept: ADMINISTRATIVE | Facility: CLINIC | Age: 82
End: 2022-06-16
Payer: MEDICARE

## 2022-06-16 LAB
ABO + RH BLD: NORMAL
ABO + RH BLD: NORMAL
BLD PROD TYP BPU: NORMAL
BLD PROD TYP BPU: NORMAL
BLOOD UNIT EXPIRATION DATE: NORMAL
BLOOD UNIT EXPIRATION DATE: NORMAL
BLOOD UNIT TYPE CODE: 600
BLOOD UNIT TYPE CODE: 600
CROSSMATCH INTERPRETATION: NORMAL
CROSSMATCH INTERPRETATION: NORMAL
DISPENSE STATUS: NORMAL
DISPENSE STATUS: NORMAL
UNIT NUMBER: NORMAL
UNIT NUMBER: NORMAL

## 2022-06-16 NOTE — PROGRESS NOTES
C3 nurse attempted to contact name for a TCC post hospital discharge follow up call. No answer. Left voicemail with callback information. The patient has a scheduled HOSFU appointment with Dr. Fragoso on 06/30/2022 @ 130 pm.   Appointment Dr. Michelle 06/30/2022 @ 0855 am.

## 2022-06-17 NOTE — PROGRESS NOTES
C3 nurse spoke with patient's daughterSarah for a TCC post hospital discharge follow up call. The patient has a scheduled HOSFU appointment with Dr. Michelle on 06/30/2022 @ 0855 am.  Appointment with Isai Fragoso 06/30/2022 @ 130 pm. Advised daughter to call Deidra Briggs MD office to schedule follow up appointment in 7-14 days; verbalized understanding.      Daughter stated patient on Flomax 0.4 mg (1) capsule daily

## 2022-06-18 ENCOUNTER — NURSE TRIAGE (OUTPATIENT)
Dept: ADMINISTRATIVE | Facility: CLINIC | Age: 82
End: 2022-06-18
Payer: MEDICARE

## 2022-06-19 ENCOUNTER — HOSPITAL ENCOUNTER (INPATIENT)
Facility: HOSPITAL | Age: 82
LOS: 3 days | Discharge: HOME HEALTH | DRG: 291 | End: 2022-06-22
Attending: EMERGENCY MEDICINE | Admitting: INTERNAL MEDICINE
Payer: MEDICARE

## 2022-06-19 DIAGNOSIS — R06.02 SHORTNESS OF BREATH: ICD-10-CM

## 2022-06-19 DIAGNOSIS — I50.31 ACUTE DIASTOLIC HEART FAILURE: ICD-10-CM

## 2022-06-19 DIAGNOSIS — I48.91 AFIB: ICD-10-CM

## 2022-06-19 DIAGNOSIS — I48.91 A-FIB: ICD-10-CM

## 2022-06-19 DIAGNOSIS — I50.9 ACUTE CONGESTIVE HEART FAILURE, UNSPECIFIED HEART FAILURE TYPE: Primary | ICD-10-CM

## 2022-06-19 LAB
ALBUMIN SERPL-MCNC: 3.5 GM/DL (ref 3.4–4.8)
ALBUMIN/GLOB SERPL: 1.1 RATIO (ref 1.1–2)
ALP SERPL-CCNC: 90 UNIT/L (ref 40–150)
ALT SERPL-CCNC: 36 UNIT/L (ref 0–55)
AST SERPL-CCNC: 38 UNIT/L (ref 5–34)
BASOPHILS # BLD AUTO: 0.06 X10(3)/MCL (ref 0–0.2)
BASOPHILS NFR BLD AUTO: 0.6 %
BILIRUBIN DIRECT+TOT PNL SERPL-MCNC: 1 MG/DL
BNP BLD-MCNC: 1118 PG/ML
BUN SERPL-MCNC: 24 MG/DL (ref 8.4–25.7)
CALCIUM SERPL-MCNC: 9.5 MG/DL (ref 8.8–10)
CHLORIDE SERPL-SCNC: 99 MMOL/L (ref 98–107)
CK MB SERPL-MCNC: 0.5 NG/ML
CK MB SERPL-MCNC: 0.6 NG/ML
CK MB SERPL-MCNC: 0.6 NG/ML
CK SERPL-CCNC: 38 U/L (ref 30–200)
CK SERPL-CCNC: 40 U/L (ref 30–200)
CK SERPL-CCNC: 46 U/L (ref 30–200)
CO2 SERPL-SCNC: 25 MMOL/L (ref 23–31)
CREAT SERPL-MCNC: 1.6 MG/DL (ref 0.73–1.18)
EOSINOPHIL # BLD AUTO: 0.2 X10(3)/MCL (ref 0–0.9)
EOSINOPHIL NFR BLD AUTO: 2.1 %
ERYTHROCYTE [DISTWIDTH] IN BLOOD BY AUTOMATED COUNT: 13.4 % (ref 11.5–17)
GLOBULIN SER-MCNC: 3.3 GM/DL (ref 2.4–3.5)
GLUCOSE SERPL-MCNC: 127 MG/DL (ref 82–115)
HCT VFR BLD AUTO: 30 % (ref 42–52)
HGB BLD-MCNC: 10 GM/DL (ref 14–18)
IMM GRANULOCYTES # BLD AUTO: 0.03 X10(3)/MCL (ref 0–0.02)
IMM GRANULOCYTES NFR BLD AUTO: 0.3 % (ref 0–0.43)
LYMPHOCYTES # BLD AUTO: 0.53 X10(3)/MCL (ref 0.6–4.6)
LYMPHOCYTES NFR BLD AUTO: 5.5 %
MAGNESIUM SERPL-MCNC: 2 MG/DL (ref 1.6–2.6)
MCH RBC QN AUTO: 29.3 PG (ref 27–31)
MCHC RBC AUTO-ENTMCNC: 33.3 MG/DL (ref 33–36)
MCV RBC AUTO: 88 FL (ref 80–94)
MONOCYTES # BLD AUTO: 0.75 X10(3)/MCL (ref 0.1–1.3)
MONOCYTES NFR BLD AUTO: 7.8 %
NEUTROPHILS # BLD AUTO: 8.1 X10(3)/MCL (ref 2.1–9.2)
NEUTROPHILS NFR BLD AUTO: 83.7 %
NRBC BLD AUTO-RTO: 0 %
PLATELET # BLD AUTO: 181 X10(3)/MCL (ref 130–400)
PMV BLD AUTO: 10.8 FL (ref 9.4–12.4)
POTASSIUM SERPL-SCNC: 4 MMOL/L (ref 3.5–5.1)
PROT SERPL-MCNC: 6.8 GM/DL (ref 5.8–7.6)
RBC # BLD AUTO: 3.41 X10(6)/MCL (ref 4.7–6.1)
SARS-COV-2 RDRP RESP QL NAA+PROBE: NEGATIVE
SODIUM SERPL-SCNC: 137 MMOL/L (ref 136–145)
TROPONIN I SERPL-MCNC: 0.39 NG/ML (ref 0–0.04)
TROPONIN I SERPL-MCNC: 0.46 NG/ML (ref 0–0.04)
TROPONIN I SERPL-MCNC: 0.47 NG/ML (ref 0–0.04)
TROPONIN I SERPL-MCNC: 0.57 NG/ML (ref 0–0.04)
WBC # SPEC AUTO: 9.6 X10(3)/MCL (ref 4.5–11.5)

## 2022-06-19 PROCEDURE — 82553 CREATINE MB FRACTION: CPT | Performed by: EMERGENCY MEDICINE

## 2022-06-19 PROCEDURE — 83880 ASSAY OF NATRIURETIC PEPTIDE: CPT | Performed by: EMERGENCY MEDICINE

## 2022-06-19 PROCEDURE — 93005 ELECTROCARDIOGRAM TRACING: CPT

## 2022-06-19 PROCEDURE — 83735 ASSAY OF MAGNESIUM: CPT | Performed by: EMERGENCY MEDICINE

## 2022-06-19 PROCEDURE — 85025 COMPLETE CBC W/AUTO DIFF WBC: CPT | Performed by: EMERGENCY MEDICINE

## 2022-06-19 PROCEDURE — 82550 ASSAY OF CK (CPK): CPT | Performed by: EMERGENCY MEDICINE

## 2022-06-19 PROCEDURE — 63600175 PHARM REV CODE 636 W HCPCS: Performed by: NURSE PRACTITIONER

## 2022-06-19 PROCEDURE — 93010 EKG 12-LEAD: ICD-10-PCS | Mod: ,,, | Performed by: INTERNAL MEDICINE

## 2022-06-19 PROCEDURE — 84484 ASSAY OF TROPONIN QUANT: CPT | Performed by: EMERGENCY MEDICINE

## 2022-06-19 PROCEDURE — 96376 TX/PRO/DX INJ SAME DRUG ADON: CPT

## 2022-06-19 PROCEDURE — 87635 SARS-COV-2 COVID-19 AMP PRB: CPT | Performed by: EMERGENCY MEDICINE

## 2022-06-19 PROCEDURE — 36415 COLL VENOUS BLD VENIPUNCTURE: CPT | Performed by: EMERGENCY MEDICINE

## 2022-06-19 PROCEDURE — 25000003 PHARM REV CODE 250: Performed by: NURSE PRACTITIONER

## 2022-06-19 PROCEDURE — 96374 THER/PROPH/DIAG INJ IV PUSH: CPT

## 2022-06-19 PROCEDURE — 93010 ELECTROCARDIOGRAM REPORT: CPT | Mod: ,,, | Performed by: INTERNAL MEDICINE

## 2022-06-19 PROCEDURE — 63600175 PHARM REV CODE 636 W HCPCS: Performed by: EMERGENCY MEDICINE

## 2022-06-19 PROCEDURE — 99285 EMERGENCY DEPT VISIT HI MDM: CPT | Mod: 25

## 2022-06-19 PROCEDURE — 11000001 HC ACUTE MED/SURG PRIVATE ROOM

## 2022-06-19 PROCEDURE — 80053 COMPREHEN METABOLIC PANEL: CPT | Performed by: EMERGENCY MEDICINE

## 2022-06-19 RX ORDER — SODIUM CHLORIDE 0.9 % (FLUSH) 0.9 %
10 SYRINGE (ML) INJECTION
Status: DISCONTINUED | OUTPATIENT
Start: 2022-06-19 | End: 2022-06-22 | Stop reason: HOSPADM

## 2022-06-19 RX ORDER — CITALOPRAM 10 MG/1
10 TABLET ORAL DAILY
Status: DISCONTINUED | OUTPATIENT
Start: 2022-06-20 | End: 2022-06-20

## 2022-06-19 RX ORDER — ATORVASTATIN CALCIUM 10 MG/1
20 TABLET, FILM COATED ORAL DAILY
Status: DISCONTINUED | OUTPATIENT
Start: 2022-06-20 | End: 2022-06-20

## 2022-06-19 RX ORDER — FINASTERIDE 5 MG/1
5 TABLET, FILM COATED ORAL DAILY
Status: DISCONTINUED | OUTPATIENT
Start: 2022-06-19 | End: 2022-06-22 | Stop reason: HOSPADM

## 2022-06-19 RX ORDER — ALLOPURINOL 100 MG/1
100 TABLET ORAL DAILY
Status: DISCONTINUED | OUTPATIENT
Start: 2022-06-20 | End: 2022-06-20

## 2022-06-19 RX ORDER — TAMSULOSIN HYDROCHLORIDE 0.4 MG/1
0.4 CAPSULE ORAL NIGHTLY
COMMUNITY

## 2022-06-19 RX ORDER — FUROSEMIDE 10 MG/ML
40 INJECTION INTRAMUSCULAR; INTRAVENOUS
Status: COMPLETED | OUTPATIENT
Start: 2022-06-19 | End: 2022-06-19

## 2022-06-19 RX ORDER — CHOLECALCIFEROL (VITAMIN D3) 25 MCG
1000 TABLET ORAL DAILY
Status: DISCONTINUED | OUTPATIENT
Start: 2022-06-19 | End: 2022-06-22 | Stop reason: HOSPADM

## 2022-06-19 RX ORDER — FUROSEMIDE 10 MG/ML
40 INJECTION INTRAMUSCULAR; INTRAVENOUS
Status: DISCONTINUED | OUTPATIENT
Start: 2022-06-19 | End: 2022-06-20

## 2022-06-19 RX ORDER — TAMSULOSIN HYDROCHLORIDE 0.4 MG/1
0.4 CAPSULE ORAL DAILY
Status: DISCONTINUED | OUTPATIENT
Start: 2022-06-19 | End: 2022-06-22 | Stop reason: HOSPADM

## 2022-06-19 RX ORDER — TALC
6 POWDER (GRAM) TOPICAL NIGHTLY PRN
Status: DISCONTINUED | OUTPATIENT
Start: 2022-06-19 | End: 2022-06-22 | Stop reason: HOSPADM

## 2022-06-19 RX ORDER — METOPROLOL TARTRATE 25 MG/1
25 TABLET, FILM COATED ORAL 2 TIMES DAILY
Status: DISCONTINUED | OUTPATIENT
Start: 2022-06-19 | End: 2022-06-21

## 2022-06-19 RX ORDER — PANTOPRAZOLE SODIUM 40 MG/1
40 TABLET, DELAYED RELEASE ORAL 2 TIMES DAILY
Status: DISCONTINUED | OUTPATIENT
Start: 2022-06-19 | End: 2022-06-22 | Stop reason: HOSPADM

## 2022-06-19 RX ADMIN — Medication 1000 UNITS: at 01:06

## 2022-06-19 RX ADMIN — FUROSEMIDE 40 MG: 10 INJECTION, SOLUTION INTRAMUSCULAR; INTRAVENOUS at 01:06

## 2022-06-19 RX ADMIN — PANTOPRAZOLE SODIUM 40 MG: 40 TABLET, DELAYED RELEASE ORAL at 09:06

## 2022-06-19 RX ADMIN — TAMSULOSIN HYDROCHLORIDE 0.4 MG: 0.4 CAPSULE ORAL at 01:06

## 2022-06-19 RX ADMIN — FUROSEMIDE 40 MG: 10 INJECTION, SOLUTION INTRAMUSCULAR; INTRAVENOUS at 02:06

## 2022-06-19 RX ADMIN — FINASTERIDE 5 MG: 5 TABLET, FILM COATED ORAL at 01:06

## 2022-06-19 RX ADMIN — APIXABAN 5 MG: 5 TABLET, FILM COATED ORAL at 09:06

## 2022-06-19 NOTE — ED NOTES
Assumed pt care, daughter at bedside, pt is resting, RR even/unlabored, updated family on bedstatus, NAD noted, CIS pgd

## 2022-06-19 NOTE — H&P
[facility]  Emergency Dept  Cardiology  History and Physical     Patient Name: Geo Barrow  MRN: [id number]  Admission Date: 6/19/2022  Code Status: Full Code   Attending Provider: Janelle Flood MD   Primary Care Physician: Deidra Briggs MD  Principal Problem:<principal problem not specified>    Patient information was obtained from patient, past medical records and ER records.     Subjective:     Chief Complaint: Shortness of Breath    HPI: Mr. Barrow is an 80 y/o male who is known to [facility], Dr. Fragoso. The patient presented to [facility] on 6.18.22 with c/o SOB. The patient reported that he had a sudden onset of SOB and he presented to the ER for evaluation. Upon arrival to the ER the patient was found to have an O2 Saturation of 89% on RA. Initial Workup revealed a Troponin of 0.472, BNP 1118 and an EKG with SR/LBBB. He was given IV Lasix and admitted to [facility] for Diuresis.     PMH: VHD (Severe AS s/p TAVR), CAD, Afib, L Sided ASHLEY/Mild, HLD, HTN/HHD, CKD Stage III, Prostate Cancer, BPH, Vitamin D Deficiency, Gout, Obesity, Tobacco Use  PSH: Inguinal Hernia Repair, Prostate Biopsy, Cataract Extraction, Angiogram, TAVR  Family History: Reviewed and Unremarkable for Heart Disease  Social History: Denies Illicit Drug and ETOH Use; Former Smoker, 1 ppd for years; Quit in 1975    Previous Cardiac Diagnostics:   ECHO 6.15.22:  The left ventricle is normal in size with mild concentric hypertrophy and low normal systolic function.  The estimated ejection fraction is 50%.  A diastolic pattern consistent with atrial fibrillation observed.  There is abnormal septal wall motion consistent with left bundle branch block.  Severe left atrial enlargement.  There is a transcutaneously-placed aortic bioprosthesis present. There is no aortic insufficiency present. Prosthetic aortic valve is normal.  The aortic valve mean gradient is 13 mmHg with a dimensionless index of 0.36.  Moderate-to-severe mitral  regurgitation.  The estimated PA systolic pressure is 36 mmHg.  Normal central venous pressure (3 mmHg).    ECHO 6.14.22:  There is a transcutaneously-placed aortic bioprosthesis present. There is no aortic insufficiency present. Prosthetic aortic valve is normal.     Ltd echo for PRE/ POST TAVR.   Post TAVR Mean PG= 6mmHg; Max velocity of 1.75m/s. No pericardial effusion noted.  Normal LV systolic function.    TAVR 6.14.22:  Procedures performed:  1. Transcatheter aortic valve replacement 26 mm S3 valve plus 2 cc right TF approach  2. Manta closure right CFA access site  3. Temporary pacemaker placement and removal  4. Limited transthoracic echo  5. Ultrasound-guided right radial, right groin access  6. TR band right radial access site     Findings:  1.  Root angiography post valve placement.  Low post dilatation was performed + 1 additional cc,  2. Limited echo post valve placement and post dilatation-mean gradient 6, trivial AI  3. Femoral angiography post closure no bleeding    Carotid US 4.8.22:  The study quality is average.   1-39% stenosis in the proximal right internal carotid artery based on Bluth Criteria.   1-39% stenosis in the proximal left internal carotid artery based on Bluth Criteria.   Antegrade right vertebral artery flow.   Antegrade left vertebral artery flow.     Review of patient's allergies indicates:  No Known Allergies    No current facility-administered medications on file prior to encounter.     Current Outpatient Medications on File Prior to Encounter   Medication Sig    tamsulosin (FLOMAX) 0.4 mg Cap Take by mouth once daily.    allopurinoL (ZYLOPRIM) 100 MG tablet Take 1 tablet by mouth once daily at 6am.    apixaban (ELIQUIS) 5 mg Tab Take 1 tablet (5 mg total) by mouth 2 (two) times daily.    atorvastatin (LIPITOR) 20 MG tablet Take 1 tablet by mouth once daily at 6am.    citalopram (CELEXA) 10 MG tablet Take 1 tablet by mouth once daily at 6am.    finasteride (PROSCAR) 5 mg  tablet Take 1 tablet (5 mg total) by mouth once daily. (Patient not taking: Reported on 6/19/2022)    metoprolol tartrate (LOPRESSOR) 25 MG tablet Take 1 tablet (25 mg total) by mouth 2 (two) times daily.    pantoprazole (PROTONIX) 40 MG tablet Take 1 tablet (40 mg total) by mouth 2 (two) times daily.    vitamin D (VITAMIN D3) 1000 units Tab Take 1,000 Units by mouth once daily. nightly    [DISCONTINUED] amLODIPine (NORVASC) 10 MG tablet Take 1 tablet by mouth once daily at 6am.    [DISCONTINUED] benazepriL (LOTENSIN) 20 MG tablet Take 1 tablet by mouth once daily at 6am.    [DISCONTINUED] furosemide (LASIX) 20 MG tablet Take 20 mg by mouth once daily. Last dose 5/30    [DISCONTINUED] warfarin (COUMADIN) 2.5 MG tablet Take 1 tablet by mouth daily as needed.     Family History     Problem Relation (Age of Onset)    Hypertension Mother    No Known Problems Father, Sister, Brother, Maternal Aunt, Maternal Uncle, Paternal Aunt, Paternal Uncle, Maternal Grandmother, Maternal Grandfather, Paternal Grandmother, Paternal Grandfather        Review of Systems   Constitutional: Negative for fever and malaise/fatigue.   Cardiovascular: Positive for dyspnea on exertion.   Respiratory: Positive for shortness of breath.    All other systems reviewed and are negative.    Objective:     Vital Signs (Most Recent):  Temp: 99.8 °F (37.7 °C) (06/19/22 0729)  Pulse: 86 (06/19/22 1130)  Resp: (!) 23 (06/19/22 1130)  BP: 130/80 (06/19/22 1130)  SpO2: 95 % (06/19/22 1130) Vital Signs (24h Range):  Temp:  [98.4 °F (36.9 °C)-99.8 °F (37.7 °C)] 99.8 °F (37.7 °C)  Pulse:  [83-93] 86  Resp:  [18-24] 23  SpO2:  [94 %-99 %] 95 %  BP: (120-141)/(69-81) 130/80     Weight: 94.3 kg (208 lb)  Body mass index is 28.21 kg/m².    SpO2: 95 %  O2 Device (Oxygen Therapy): nasal cannula      Intake/Output Summary (Last 24 hours) at 6/19/2022 1145  Last data filed at 6/19/2022 0617  Gross per 24 hour   Intake --   Output 2200 ml   Net -2200 ml        Lines/Drains/Airways     Peripheral Intravenous Line  Duration                Peripheral IV - Single Lumen 06/19/22 0000 18 G Left Forearm <1 day                Physical Exam  HENT:      Head: Normocephalic.      Mouth/Throat:      Mouth: Mucous membranes are moist.   Eyes:      Extraocular Movements: Extraocular movements intact.   Cardiovascular:      Rate and Rhythm: Normal rate and regular rhythm.      Pulses: Normal pulses.      Heart sounds: Normal heart sounds.   Pulmonary:      Effort: Pulmonary effort is normal.      Breath sounds: Normal breath sounds.   Abdominal:      Palpations: Abdomen is soft.   Skin:     General: Skin is warm and dry.   Neurological:      General: No focal deficit present.      Mental Status: He is alert and oriented to person, place, and time.   Psychiatric:         Mood and Affect: Mood normal.         Behavior: Behavior normal.         Significant Labs:   BMP:   Recent Labs   Lab 06/19/22 0059 06/19/22  0111     --    K 4.0  --    CO2 25  --    BUN 24.0  --    CREATININE 1.60*  --    CALCIUM 9.5  --    MG  --  2.00   , CMP   Recent Labs   Lab 06/19/22 0059      K 4.0   CO2 25   BUN 24.0   CREATININE 1.60*   CALCIUM 9.5   ALBUMIN 3.5   BILITOT 1.0   ALKPHOS 90   AST 38*   ALT 36   EGFRNONAA 44    and CBC   Recent Labs   Lab 06/19/22 0059   WBC 9.6   HGB 10.0*   HCT 30.0*            Assessment and Plan:   Shortness of Breath  Acute on Chronic Diastolic HF/EF 50%. JVD to mandible on presentation with orthopnea and diuretic noncompliance x2 weeks.  VHD (Severe AS)    - s/p TAVR (6.14.22) - 26mm S3 Valve plus 2 cc Right TF Jean  CAD/Non-Obstructive  PAF/CVR    - CHADsVASc - 4 Points - 4.8% Stroke Risk per Year  Bilateral ASHLEY/Mild  HTN/HHD with Hx of HF/CKD Stage III  CKD Stage III  Prostate Cancer  BPH  Vitamin D Deficiency  Gout  Obesity  Former Smoker    PLAN:   Agree with Diuresis with Lasix 40mg IVP BID  Accurate I&Os and Daily Weights  Resume Home  Medications  Preventice Monitor Reviewed with No Pauses/Bradycardia Noted  Labs in AM: CBC, CMP and Mg    VTE Risk Mitigation (From admission, onward)         Ordered     IP VTE HIGH RISK PATIENT  Once         06/19/22 0250     Place sequential compression device  Until discontinued         06/19/22 0250              ARNIE Bird  Cardiology  [facility] - Emergency Dept  06/19/2022 11:45 AM

## 2022-06-19 NOTE — ED PROVIDER NOTES
Encounter Date: 6/19/2022       History     Chief Complaint   Patient presents with    Shortness of Breath     81-year-old male 5 days status post TAVR procedure complains of shortness of breath since yesterday, home O2 89% on room air.  No fever or cough.  No chest pain.  Incision in the right groin is healing well.  No other complaints.  He states they did discontinue his diuretic the week before the procedure and he is currently not taking any diuretics.      Shortness of Breath  This is a new problem. The average episode lasts 2 days. The problem occurs continuously.The current episode started 2 days ago. The problem has been gradually worsening. Pertinent negatives include no fever, no cough, no chest pain, no syncope and no leg pain. He has tried nothing for the symptoms.     Review of patient's allergies indicates:  No Known Allergies  Past Medical History:   Diagnosis Date    Aortic valve stenosis     Atrial fibrillation     BPH (benign prostatic hyperplasia)     CAD (coronary artery disease)     Gout, unspecified     Hyperlipidemia     Hypertension     Stenosis of aortic and mitral valves     Vitamin D deficiency      Past Surgical History:   Procedure Laterality Date    ABDOMINAL AORTOGRAPHY N/A 6/14/2022    Procedure: AORTOGRAM, ABDOMINAL;  Surgeon: Reese Fragoso MD;  Location: [facility] CATH LAB;  Service: Cardiology;  Laterality: N/A;    CARDIAC CATHETERIZATION      CATARACT EXTRACTION      ESOPHAGOGASTRODUODENOSCOPY N/A 05/24/2022    Procedure: EGD (ESOPHAGOGASTRODUODENOSCOPY);  Surgeon: Lino Rider MD;  Location: [facility] ENDOSCOPY;  Service: Gastroenterology;  Laterality: N/A;    PROSTATE BIOPSY      TRANSCATHETER AORTIC VALVE REPLACEMENT (TAVR) N/A 6/14/2022    Procedure: REPLACEMENT, AORTIC VALVE, TRANSCATHETER;  Surgeon: Reese Fragoso MD;  Location: [facility] CATH LAB;  Service: Cardiology;  Laterality: N/A;  TAVR     Family History   Problem Relation Age of Onset     Hypertension Mother     No Known Problems Father     No Known Problems Sister     No Known Problems Brother     No Known Problems Maternal Aunt     No Known Problems Maternal Uncle     No Known Problems Paternal Aunt     No Known Problems Paternal Uncle     No Known Problems Maternal Grandmother     No Known Problems Maternal Grandfather     No Known Problems Paternal Grandmother     No Known Problems Paternal Grandfather      Social History     Tobacco Use    Smoking status: Former Smoker    Smokeless tobacco: Never Used   Substance Use Topics    Alcohol use: Yes     Alcohol/week: 6.0 standard drinks     Types: 6 Cans of beer per week     Comment: 6 beers nightly, hasnt drank in last month    Drug use: Never     Review of Systems   Constitutional: Negative for fever.   Respiratory: Positive for shortness of breath. Negative for cough.    Cardiovascular: Negative for chest pain and syncope.   All other systems reviewed and are negative.      Physical Exam     Initial Vitals [06/19/22 0020]   BP Pulse Resp Temp SpO2   139/73 89 18 99.4 °F (37.4 °C) 96 %      MAP       --         Physical Exam    Constitutional: Vital signs are normal. He appears well-developed and well-nourished.   HENT:   Head: Normocephalic and atraumatic.   Neck: Neck supple.   Cardiovascular: Normal rate and regular rhythm.   Murmur heard.  Pulmonary/Chest: Breath sounds normal.   Musculoskeletal:      Cervical back: Neck supple. No rigidity.      Comments: No sign of DVT, trace lower extremity edema, incision in the right groin is clean, dry, intact.     Neurological: He is alert and oriented to person, place, and time.   Skin: Skin is warm, dry and intact.         ED Course   Procedures  Labs Reviewed   TROPONIN I - Abnormal; Notable for the following components:       Result Value    Troponin-I 0.474 (*)     All other components within normal limits   B-TYPE NATRIURETIC PEPTIDE - Abnormal; Notable for the following components:     Natriuretic Peptide 1,118.0 (*)     All other components within normal limits   CBC WITH DIFFERENTIAL - Abnormal; Notable for the following components:    RBC 3.41 (*)     Hgb 10.0 (*)     Hct 30.0 (*)     Lymph # 0.53 (*)     IG# 0.03 (*)     All other components within normal limits   COMPREHENSIVE METABOLIC PANEL - Abnormal; Notable for the following components:    Glucose Level 127 (*)     Creatinine 1.60 (*)     Aspartate Aminotransferase 38 (*)     All other components within normal limits   TROPONIN I - Abnormal; Notable for the following components:    Troponin-I 0.569 (*)     All other components within normal limits   SARS-COV-2 RNA AMPLIFICATION, QUAL - Normal   MAGNESIUM - Normal   CK - Normal   CK-MB - Normal   CK   CK-MB   TROPONIN I     EKG Readings: (Independently Interpreted)   Initial Reading: No STEMI. Rhythm: Normal Sinus Rhythm. Conduction: LBBB. Other Impression: expected ST changes due to LBBB       Imaging Results          X-Ray Chest 1 View (Final result)  Result time 06/19/22 05:30:48    Final result by Neil Rader MD (06/19/22 05:30:48)                 Impression:      No active pulmonary disease.    Interval insertion of stented valve since the last exam      Electronically signed by: Neil Rader  Date:    06/19/2022  Time:    05:30             Narrative:    EXAMINATION:  XR CHEST 1 VIEW    CPT 87810    CLINICAL HISTORY:  shortness of breath;    COMPARISON:  June 13, 2022    FINDINGS:  Cardiomediastinal silhouette improved parenchymal changes are essentially unchanged when accounting for projection and difference in technique as compared with the previous exam.    Interval insertion of a stented valve since the last exam                              X-Rays:   Independently Interpreted Readings:   Other Readings:  Chest x-ray shows no pleural effusions or pulmonary edema    Medications   sodium chloride 0.9% flush 10 mL (has no administration in time range)   melatonin  tablet 6 mg (has no administration in time range)   furosemide injection 40 mg (40 mg Intravenous Given 6/19/22 0224)     Medical Decision Making:   Initial Assessment:   81-year-old male 5 days status post TAVR procedure complains of shortness of breath progressively worse since yesterday with room air O2 sat of 89%, no chest pain.  Differential Diagnosis:   Acute coronary syndrome, congestive heart failure, pleural effusion, surgical complication due to TAVR  Independently Interpreted Test(s):   I have ordered and independently interpreted X-rays - see prior notes.  I have ordered and independently interpreted EKG Reading(s) - see prior notes  Clinical Tests:   Lab Tests: Reviewed  Radiological Study: Reviewed  Medical Tests: Reviewed  ED Management:  Patient has been on oxygen since he arrived and has been asymptomatic since arrival.  I will give him 1 dose of Lasix 40 mg IV.   Other:   I have discussed this case with another health care provider.       <> Summary of the Discussion: Case discussed with Mona nurse practitioner on-call for the Cardiology Service.  Patient will be placed in observation to the cardiology service.  She agrees with the single dose of Lasix and we will keep him on oxygen and cycle his cardiac enzymes.             ED Course as of 06/19/22 1121   Sun Jun 19, 2022   0920 Patient is admitted to [facility] on previous shift which does not come to this facility and therefore will not have anyone to round on him. Spoke with hospitalist group to see if they would be able to take over as primary so that tele doc and/or our hospitalist could see him and place orders however the hospitalist group felt that this is a Cardiology issue and it would be in the best interest of the patient to have Cards stay as primary. ER to ER transfer arranged so that he will have a primary team see him today [MP]   [id number] This is Lauren Rider MD. I have seen and evaluated this patient. He is 5 days post-op TAVR,  presented to the ED at [facility] for dyspnea and orthopnea, oxygen saturations 88% on RA at home per daughter. He was placed on oxygen and has been asymptomatic since as long as he is sitting upright.  Labs remarkable for elevated BNP and mild elevation in troponin.  Chest x-ray without opacities or obvious pleural effusion.  He was given a dose of Lasix and placed in observation with [facility]. He is currently resting comfortably, respirations unlabored, no overt lower extremity edema.  [facility] is aware that the patient is here. He is admitted. [RB]      ED Course User Index  [MP] Nadir Deshpande DO  [RB] Lauren Rider MD             Clinical Impression:   Final diagnoses:  [R06.02] Shortness of breath  [I50.9] Acute congestive heart failure, unspecified heart failure type (Primary)          ED Disposition Condition    Transfer to Another Facility Stable            Patient now at [facility] and is admitted to observation to Dr. Flood.      Lauren Rider MD  06/19/22 1121       Lauren Rider MD  06/19/22 1122

## 2022-06-19 NOTE — TELEPHONE ENCOUNTER
Reason for Disposition   Sounds like a life-threatening emergency to the triager    Protocols used: POST-OP SYMPTOMS AND [id number]  daughter called re pt had TAVR on tues. since last pm pt hasnt been sleeping. last pm hard to catch his breath. stayed in recliner today. Pt now on double pillows stating fluid starting to build up. o2 sat=84-86%. MD stopped his Lasix. rec EMS. Caller states can we just take him? Reiterate EMS due to low O2 sat, trouble catching his breath. Call back with questions

## 2022-06-19 NOTE — ED PROVIDER NOTES
Patient is admitted to [facility] on previous shift which does not come to this facility and therefore will not have anyone to round on him. Spoke with hospitalist group to see if they would be able to take over as primary so that tele doc and/or our hospitalist could see him and place orders however the hospitalist group felt that this is a Cardiology issue and it would be in the best interest of the patient to have Cards stay as primary. ER to ER transfer arranged so that he will have a primary team see him today       Nadir Deshpande, DO  06/19/22 0929

## 2022-06-19 NOTE — Clinical Note
Diagnosis: Acute congestive heart failure, unspecified heart failure type [[id number]]  Future Attending Provider: ISELA LOPEZ [[id number]]  Admitting Provider:: ISELA LOPEZ [[id number]]

## 2022-06-19 NOTE — PROGRESS NOTES
Received a call from Dr. Lugo at [facility] ortho ER regarding patient known to [facility], sees Dr. Fragoso s/p TAVR 6/14/22, CAD, HTN, HLD, afib, CKD, presents with c/o SOB that started 6/18/22, home 02 sats 89% on RA; denies CP. His diuretic was stopped at discharge. Work up in ER reveals trop 0.472, BNP 1118, SR, LBBB on EKG. Will admit as observation, give IV diuresis, monitor intake and output and further evaluate in the morning.

## 2022-06-19 NOTE — ED NOTES
Bed: 01  Expected date: 6/18/22  Expected time: 11:46 PM  Means of arrival:   Comments:  81 M Heartvalve replacement Monday / SOB / Re-route

## 2022-06-19 NOTE — ED TRIAGE NOTES
Pt reports to ED with c/o SOB x 1 day. Hx of aortic valve stensosis s/p TAVR/aortic valve replacement x 5 days ago. Denies fever, chills, body aches, and CP. [facility] [facility] reported RA SpO2 =89%. Placed on 4 LPM NC = 95%.

## 2022-06-20 LAB
ALBUMIN SERPL-MCNC: 3.2 GM/DL (ref 3.4–4.8)
ALBUMIN/GLOB SERPL: 1.3 RATIO (ref 1.1–2)
ALP SERPL-CCNC: 84 UNIT/L (ref 40–150)
ALT SERPL-CCNC: 34 UNIT/L (ref 0–55)
AST SERPL-CCNC: 33 UNIT/L (ref 5–34)
BASOPHILS # BLD AUTO: 0.06 X10(3)/MCL (ref 0–0.2)
BASOPHILS NFR BLD AUTO: 0.9 %
BILIRUBIN DIRECT+TOT PNL SERPL-MCNC: 1.4 MG/DL
BUN SERPL-MCNC: 22.7 MG/DL (ref 8.4–25.7)
CALCIUM SERPL-MCNC: 8.9 MG/DL (ref 8.8–10)
CHLORIDE SERPL-SCNC: 99 MMOL/L (ref 98–107)
CO2 SERPL-SCNC: 28 MMOL/L (ref 23–31)
CREAT SERPL-MCNC: 1.24 MG/DL (ref 0.73–1.18)
EOSINOPHIL # BLD AUTO: 0.25 X10(3)/MCL (ref 0–0.9)
EOSINOPHIL NFR BLD AUTO: 3.9 %
ERYTHROCYTE [DISTWIDTH] IN BLOOD BY AUTOMATED COUNT: 13.4 % (ref 11.5–17)
GLOBULIN SER-MCNC: 2.5 GM/DL (ref 2.4–3.5)
GLUCOSE SERPL-MCNC: 103 MG/DL (ref 82–115)
HCT VFR BLD AUTO: 27.8 % (ref 42–52)
HGB BLD-MCNC: 9 GM/DL (ref 14–18)
IMM GRANULOCYTES # BLD AUTO: 0.04 X10(3)/MCL (ref 0–0.02)
IMM GRANULOCYTES NFR BLD AUTO: 0.6 % (ref 0–0.43)
LYMPHOCYTES # BLD AUTO: 0.69 X10(3)/MCL (ref 0.6–4.6)
LYMPHOCYTES NFR BLD AUTO: 10.9 %
MAGNESIUM SERPL-MCNC: 2.2 MG/DL (ref 1.6–2.6)
MCH RBC QN AUTO: 29.1 PG (ref 27–31)
MCHC RBC AUTO-ENTMCNC: 32.4 MG/DL (ref 33–36)
MCV RBC AUTO: 90 FL (ref 80–94)
MONOCYTES # BLD AUTO: 0.56 X10(3)/MCL (ref 0.1–1.3)
MONOCYTES NFR BLD AUTO: 8.8 %
NEUTROPHILS # BLD AUTO: 4.7 X10(3)/MCL (ref 2.1–9.2)
NEUTROPHILS NFR BLD AUTO: 74.9 %
NRBC BLD AUTO-RTO: 0 %
PLATELET # BLD AUTO: 177 X10(3)/MCL (ref 130–400)
PMV BLD AUTO: 10.9 FL (ref 9.4–12.4)
POTASSIUM SERPL-SCNC: 3.4 MMOL/L (ref 3.5–5.1)
PROT SERPL-MCNC: 5.7 GM/DL (ref 5.8–7.6)
RBC # BLD AUTO: 3.09 X10(6)/MCL (ref 4.7–6.1)
SODIUM SERPL-SCNC: 137 MMOL/L (ref 136–145)
WBC # SPEC AUTO: 6.3 X10(3)/MCL (ref 4.5–11.5)

## 2022-06-20 PROCEDURE — 25000003 PHARM REV CODE 250: Performed by: NURSE PRACTITIONER

## 2022-06-20 PROCEDURE — 93005 ELECTROCARDIOGRAM TRACING: CPT

## 2022-06-20 PROCEDURE — 85025 COMPLETE CBC W/AUTO DIFF WBC: CPT | Performed by: NURSE PRACTITIONER

## 2022-06-20 PROCEDURE — 83735 ASSAY OF MAGNESIUM: CPT | Performed by: NURSE PRACTITIONER

## 2022-06-20 PROCEDURE — 63600175 PHARM REV CODE 636 W HCPCS: Performed by: INTERNAL MEDICINE

## 2022-06-20 PROCEDURE — 11000001 HC ACUTE MED/SURG PRIVATE ROOM

## 2022-06-20 PROCEDURE — 80053 COMPREHEN METABOLIC PANEL: CPT | Performed by: NURSE PRACTITIONER

## 2022-06-20 PROCEDURE — 36415 COLL VENOUS BLD VENIPUNCTURE: CPT | Performed by: NURSE PRACTITIONER

## 2022-06-20 PROCEDURE — 25000003 PHARM REV CODE 250: Performed by: INTERNAL MEDICINE

## 2022-06-20 RX ORDER — ATORVASTATIN CALCIUM 10 MG/1
20 TABLET, FILM COATED ORAL DAILY
Status: DISCONTINUED | OUTPATIENT
Start: 2022-06-20 | End: 2022-06-22 | Stop reason: HOSPADM

## 2022-06-20 RX ORDER — FUROSEMIDE 10 MG/ML
40 INJECTION INTRAMUSCULAR; INTRAVENOUS
Status: DISCONTINUED | OUTPATIENT
Start: 2022-06-20 | End: 2022-06-21

## 2022-06-20 RX ORDER — ALLOPURINOL 100 MG/1
100 TABLET ORAL DAILY
Status: DISCONTINUED | OUTPATIENT
Start: 2022-06-20 | End: 2022-06-22 | Stop reason: HOSPADM

## 2022-06-20 RX ORDER — CITALOPRAM 10 MG/1
10 TABLET ORAL DAILY
Status: DISCONTINUED | OUTPATIENT
Start: 2022-06-20 | End: 2022-06-22 | Stop reason: HOSPADM

## 2022-06-20 RX ORDER — METOPROLOL TARTRATE 1 MG/ML
5 INJECTION, SOLUTION INTRAVENOUS
Status: DISCONTINUED | OUTPATIENT
Start: 2022-06-20 | End: 2022-06-22 | Stop reason: HOSPADM

## 2022-06-20 RX ORDER — POTASSIUM CHLORIDE 20 MEQ/1
20 TABLET, EXTENDED RELEASE ORAL ONCE
Status: COMPLETED | OUTPATIENT
Start: 2022-06-20 | End: 2022-06-20

## 2022-06-20 RX ADMIN — FUROSEMIDE 40 MG: 10 INJECTION, SOLUTION INTRAMUSCULAR; INTRAVENOUS at 05:06

## 2022-06-20 RX ADMIN — PANTOPRAZOLE SODIUM 40 MG: 40 TABLET, DELAYED RELEASE ORAL at 09:06

## 2022-06-20 RX ADMIN — ALLOPURINOL 100 MG: 100 TABLET ORAL at 09:06

## 2022-06-20 RX ADMIN — ATORVASTATIN CALCIUM 20 MG: 10 TABLET, FILM COATED ORAL at 09:06

## 2022-06-20 RX ADMIN — TAMSULOSIN HYDROCHLORIDE 0.4 MG: 0.4 CAPSULE ORAL at 09:06

## 2022-06-20 RX ADMIN — METOPROLOL TARTRATE 25 MG: 25 TABLET, FILM COATED ORAL at 09:06

## 2022-06-20 RX ADMIN — METOPROLOL TARTRATE 5 MG: 5 INJECTION, SOLUTION INTRAVENOUS at 06:06

## 2022-06-20 RX ADMIN — APIXABAN 5 MG: 5 TABLET, FILM COATED ORAL at 09:06

## 2022-06-20 RX ADMIN — POTASSIUM CHLORIDE 20 MEQ: 20 TABLET, EXTENDED RELEASE ORAL at 01:06

## 2022-06-20 RX ADMIN — Medication 1000 UNITS: at 09:06

## 2022-06-20 NOTE — NURSING
Patient running A.fib. RN called to notify SHARON To. NP will put in new orders. For patient RN will continue to monitor patient. Patient denies any palpitation, chest pain, SOB at this time.

## 2022-06-20 NOTE — PROGRESS NOTES
[facility] - Emergency Dept  Cardiology  History and Physical     Patient Name: Geo Barrow  MRN: [id number]  Admission Date: 6/19/2022  Code Status: Full Code   Attending Provider: Janelle Flood MD   Primary Care Physician: Deidra Briggs MD  Principal Problem:<principal problem not specified>    Patient information was obtained from patient, past medical records and ER records.     Subjective:     Chief Complaint: Shortness of Breath    HPI: Mr. Barrow is an 82 y/o male who is known to [facility], Dr. Fragoso. The patient presented to [facility] on 6.18.22 with c/o SOB. The patient reported that he had a sudden onset of SOB and he presented to the ER for evaluation. Upon arrival to the ER the patient was found to have an O2 Saturation of 89% on RA. Initial Workup revealed a Troponin of 0.472, BNP 1118 and an EKG with SR/LBBB. He was given IV Lasix and admitted to [facility] for Diuresis.     Hospital Course:  6.20.22; Patient awake in bed. Reports SOB is improving but not back to baseline. Still with mild conversational dyspnea. Patient concerned about being on betablocker as his previous cardiology reported he had an allergy to betablockers as he began to experience episodes of heart failure post initiation of betablocker.      PMH: VHD (Severe AS s/p TAVR), CAD, Afib, L Sided ASHLEY/Mild, HLD, HTN/HHD, CKD Stage III, Prostate Cancer, BPH, Vitamin D Deficiency, Gout, Obesity, Tobacco Use  PSH: Inguinal Hernia Repair, Prostate Biopsy, Cataract Extraction, Angiogram, TAVR  Family History: Reviewed and Unremarkable for Heart Disease  Social History: Denies Illicit Drug and ETOH Use; Former Smoker, 1 ppd for years; Quit in 1975    Previous Cardiac Diagnostics:   ECHO 6.15.22:  The left ventricle is normal in size with mild concentric hypertrophy and low normal systolic function.  The estimated ejection fraction is 50%.  A diastolic pattern consistent with atrial fibrillation observed.  There is abnormal septal wall  motion consistent with left bundle branch block.  Severe left atrial enlargement.  There is a transcutaneously-placed aortic bioprosthesis present. There is no aortic insufficiency present. Prosthetic aortic valve is normal.  The aortic valve mean gradient is 13 mmHg with a dimensionless index of 0.36.  Moderate-to-severe mitral regurgitation.  The estimated PA systolic pressure is 36 mmHg.  Normal central venous pressure (3 mmHg).    ECHO 6.14.22:  There is a transcutaneously-placed aortic bioprosthesis present. There is no aortic insufficiency present. Prosthetic aortic valve is normal.     Ltd echo for PRE/ POST TAVR.   Post TAVR Mean PG= 6mmHg; Max velocity of 1.75m/s. No pericardial effusion noted.  Normal LV systolic function.    TAVR 6.14.22:  Procedures performed:  1. Transcatheter aortic valve replacement 26 mm S3 valve plus 2 cc right TF approach  2. Manta closure right CFA access site  3. Temporary pacemaker placement and removal  4. Limited transthoracic echo  5. Ultrasound-guided right radial, right groin access  6. TR band right radial access site     Findings:  1.  Root angiography post valve placement.  Low post dilatation was performed + 1 additional cc,  2. Limited echo post valve placement and post dilatation-mean gradient 6, trivial AI  3. Femoral angiography post closure no bleeding    Carotid US 4.8.22:  The study quality is average.   1-39% stenosis in the proximal right internal carotid artery based on Bluth Criteria.   1-39% stenosis in the proximal left internal carotid artery based on Bluth Criteria.   Antegrade right vertebral artery flow.   Antegrade left vertebral artery flow.     Review of patient's allergies indicates:  No Known Allergies    No current facility-administered medications on file prior to encounter.     Current Outpatient Medications on File Prior to Encounter   Medication Sig    tamsulosin (FLOMAX) 0.4 mg Cap Take by mouth once daily.    allopurinoL (ZYLOPRIM) 100 MG  tablet Take 1 tablet by mouth once daily at 6am.    apixaban (ELIQUIS) 5 mg Tab Take 1 tablet (5 mg total) by mouth 2 (two) times daily.    atorvastatin (LIPITOR) 20 MG tablet Take 1 tablet by mouth once daily at 6am.    citalopram (CELEXA) 10 MG tablet Take 1 tablet by mouth once daily at 6am.    finasteride (PROSCAR) 5 mg tablet Take 1 tablet (5 mg total) by mouth once daily. (Patient not taking: Reported on 6/19/2022)    metoprolol tartrate (LOPRESSOR) 25 MG tablet Take 1 tablet (25 mg total) by mouth 2 (two) times daily.    pantoprazole (PROTONIX) 40 MG tablet Take 1 tablet (40 mg total) by mouth 2 (two) times daily.    vitamin D (VITAMIN D3) 1000 units Tab Take 1,000 Units by mouth once daily. nightly    [DISCONTINUED] amLODIPine (NORVASC) 10 MG tablet Take 1 tablet by mouth once daily at 6am.    [DISCONTINUED] benazepriL (LOTENSIN) 20 MG tablet Take 1 tablet by mouth once daily at 6am.    [DISCONTINUED] furosemide (LASIX) 20 MG tablet Take 20 mg by mouth once daily. Last dose 5/30    [DISCONTINUED] warfarin (COUMADIN) 2.5 MG tablet Take 1 tablet by mouth daily as needed.     Family History     Problem Relation (Age of Onset)    Hypertension Mother    No Known Problems Father, Sister, Brother, Maternal Aunt, Maternal Uncle, Paternal Aunt, Paternal Uncle, Maternal Grandmother, Maternal Grandfather, Paternal Grandmother, Paternal Grandfather        Review of Systems   Constitutional: Negative for fever and malaise/fatigue.   Cardiovascular: Positive for dyspnea on exertion.   Respiratory: Positive for shortness of breath.    Musculoskeletal: Negative.    Gastrointestinal: Negative.    Genitourinary: Negative.    Neurological: Negative.    Psychiatric/Behavioral: Negative.    All other systems reviewed and are negative.    Objective:     Vital Signs (Most Recent):  Temp: 99.8 °F (37.7 °C) (06/19/22 0729)  Pulse: 96 (06/20/22 0600)  Resp: 19 (06/20/22 0600)  BP: (!) 148/92 (06/20/22 0826)  SpO2: 99 %  (06/20/22 0600) Vital Signs (24h Range):  Pulse:  [] 96  Resp:  [16-26] 19  SpO2:  [94 %-99 %] 99 %  BP: (119-158)/(61-96) 148/92     Weight: 94.3 kg (208 lb)  Body mass index is 28.21 kg/m².    SpO2: 99 %  O2 Device (Oxygen Therapy): room air      Intake/Output Summary (Last 24 hours) at 6/20/2022 1039  Last data filed at 6/20/2022 0600  Gross per 24 hour   Intake 100 ml   Output 2700 ml   Net -2600 ml       Lines/Drains/Airways     Peripheral Intravenous Line  Duration                Peripheral IV - Single Lumen 06/19/22 0000 18 G Left Forearm 1 day                Physical Exam  HENT:      Head: Normocephalic.      Mouth/Throat:      Mouth: Mucous membranes are moist.   Eyes:      Extraocular Movements: Extraocular movements intact.   Cardiovascular:      Rate and Rhythm: Normal rate and regular rhythm.      Pulses: Normal pulses.      Heart sounds: Normal heart sounds.   Pulmonary:      Effort: Pulmonary effort is normal.      Breath sounds: Normal breath sounds.   Abdominal:      Palpations: Abdomen is soft.   Skin:     General: Skin is warm and dry.   Neurological:      General: No focal deficit present.      Mental Status: He is alert and oriented to person, place, and time.   Psychiatric:         Mood and Affect: Mood normal.         Behavior: Behavior normal.         Significant Labs:   BMP:   Recent Labs   Lab 06/19/22 0059 06/19/22  0111 06/20/22 0437     --  137   K 4.0  --  3.4*   CO2 25  --  28   BUN 24.0  --  22.7   CREATININE 1.60*  --  1.24*   CALCIUM 9.5  --  8.9   MG  --  2.00 2.20   , CMP   Recent Labs   Lab 06/19/22 0059 06/20/22 0437    137   K 4.0 3.4*   CO2 25 28   BUN 24.0 22.7   CREATININE 1.60* 1.24*   CALCIUM 9.5 8.9   ALBUMIN 3.5 3.2*   BILITOT 1.0 1.4   ALKPHOS 90 84   AST 38* 33   ALT 36 34   EGFRNONAA 44 60    and CBC   Recent Labs   Lab 06/19/22 0059 06/20/22 0437   WBC 9.6 6.3   HGB 10.0* 9.0*   HCT 30.0* 27.8*    177         Assessment and Plan:    Shortness of Breath  Acute on Chronic Diastolic HF/EF 50%. JVD to mandible on presentation with orthopnea and diuretic noncompliance x2 weeks.  VHD (Severe AS)    - s/p TAVR (6.14.22) - 26mm S3 Valve plus 2 cc Right TF Jean  CAD/Non-Obstructive  PAF/CVR    - CHADsVASc - 4 Points - 4.8% Stroke Risk per Year  Bilateral ASHLEY/Mild  HTN/HHD with Hx of HF/CKD Stage III  CKD Stage III  Prostate Cancer  BPH  Vitamin D Deficiency  Gout  Obesity  Former Smoker    PLAN:   Symptomatically improving. Continue Lasix 40 mg IVP BID. Discussed possible BB allergy with Dr. Jones. Instructed to continue betablocker. Ensure accurate I&O's/daily weights.   Accurate I&Os and Daily Weights  Resume Home Medications  Preventice Monitor Reviewed with No Pauses/Bradycardia Noted  Labs in AM: CBC, CMP and Mg    VTE Risk Mitigation (From admission, onward)         Ordered     apixaban tablet 5 mg  2 times daily         06/19/22 1215     IP VTE HIGH RISK PATIENT  Once         06/19/22 0250     Place sequential compression device  Until discontinued         06/19/22 0250              Esperanza Garcia, MARTA  Cardiology  [facility] - Emergency Dept  06/20/2022 11:45 AM

## 2022-06-21 PROBLEM — I50.31 ACUTE DIASTOLIC HEART FAILURE: Status: ACTIVE | Noted: 2022-06-21

## 2022-06-21 LAB
ANION GAP SERPL CALC-SCNC: 10 MEQ/L
BASOPHILS # BLD AUTO: 0.06 X10(3)/MCL (ref 0–0.2)
BASOPHILS NFR BLD AUTO: 0.9 %
BUN SERPL-MCNC: 25.1 MG/DL (ref 8.4–25.7)
CALCIUM SERPL-MCNC: 8.9 MG/DL (ref 8.8–10)
CHLORIDE SERPL-SCNC: 100 MMOL/L (ref 98–107)
CO2 SERPL-SCNC: 30 MMOL/L (ref 23–31)
CREAT SERPL-MCNC: 1.53 MG/DL (ref 0.73–1.18)
CREAT/UREA NIT SERPL: 16
EOSINOPHIL # BLD AUTO: 0.35 X10(3)/MCL (ref 0–0.9)
EOSINOPHIL NFR BLD AUTO: 5 %
ERYTHROCYTE [DISTWIDTH] IN BLOOD BY AUTOMATED COUNT: 13.5 % (ref 11.5–17)
GLUCOSE SERPL-MCNC: 152 MG/DL (ref 82–115)
HCT VFR BLD AUTO: 28.2 % (ref 42–52)
HGB BLD-MCNC: 9.3 GM/DL (ref 14–18)
IMM GRANULOCYTES # BLD AUTO: 0.04 X10(3)/MCL (ref 0–0.02)
IMM GRANULOCYTES NFR BLD AUTO: 0.6 % (ref 0–0.43)
LYMPHOCYTES # BLD AUTO: 0.6 X10(3)/MCL (ref 0.6–4.6)
LYMPHOCYTES NFR BLD AUTO: 8.6 %
MCH RBC QN AUTO: 29.2 PG (ref 27–31)
MCHC RBC AUTO-ENTMCNC: 33 MG/DL (ref 33–36)
MCV RBC AUTO: 88.4 FL (ref 80–94)
MONOCYTES # BLD AUTO: 0.4 X10(3)/MCL (ref 0.1–1.3)
MONOCYTES NFR BLD AUTO: 5.7 %
NEUTROPHILS # BLD AUTO: 5.5 X10(3)/MCL (ref 2.1–9.2)
NEUTROPHILS NFR BLD AUTO: 79.2 %
NRBC BLD AUTO-RTO: 0 %
PLATELET # BLD AUTO: 202 X10(3)/MCL (ref 130–400)
PMV BLD AUTO: 11.4 FL (ref 9.4–12.4)
POTASSIUM SERPL-SCNC: 3.4 MMOL/L (ref 3.5–5.1)
RBC # BLD AUTO: 3.19 X10(6)/MCL (ref 4.7–6.1)
SODIUM SERPL-SCNC: 140 MMOL/L (ref 136–145)
WBC # SPEC AUTO: 7 X10(3)/MCL (ref 4.5–11.5)

## 2022-06-21 PROCEDURE — 63600175 PHARM REV CODE 636 W HCPCS: Performed by: INTERNAL MEDICINE

## 2022-06-21 PROCEDURE — 93005 ELECTROCARDIOGRAM TRACING: CPT

## 2022-06-21 PROCEDURE — 25000003 PHARM REV CODE 250: Performed by: NURSE PRACTITIONER

## 2022-06-21 PROCEDURE — 80048 BASIC METABOLIC PNL TOTAL CA: CPT | Performed by: NURSE PRACTITIONER

## 2022-06-21 PROCEDURE — 85025 COMPLETE CBC W/AUTO DIFF WBC: CPT | Performed by: NURSE PRACTITIONER

## 2022-06-21 PROCEDURE — 36415 COLL VENOUS BLD VENIPUNCTURE: CPT | Performed by: NURSE PRACTITIONER

## 2022-06-21 PROCEDURE — 25000003 PHARM REV CODE 250: Performed by: INTERNAL MEDICINE

## 2022-06-21 PROCEDURE — 63600175 PHARM REV CODE 636 W HCPCS: Performed by: NURSE PRACTITIONER

## 2022-06-21 PROCEDURE — 11000001 HC ACUTE MED/SURG PRIVATE ROOM

## 2022-06-21 RX ORDER — AMLODIPINE BESYLATE 10 MG/1
10 TABLET ORAL DAILY
Qty: 30 TABLET | Refills: 11 | Status: SHIPPED | OUTPATIENT
Start: 2022-06-21 | End: 2022-08-25

## 2022-06-21 RX ORDER — AMIODARONE HYDROCHLORIDE 200 MG/1
400 TABLET ORAL 2 TIMES DAILY
Status: DISCONTINUED | OUTPATIENT
Start: 2022-06-21 | End: 2022-06-22 | Stop reason: HOSPADM

## 2022-06-21 RX ORDER — FUROSEMIDE 40 MG/1
40 TABLET ORAL DAILY
Status: DISCONTINUED | OUTPATIENT
Start: 2022-06-22 | End: 2022-06-22 | Stop reason: HOSPADM

## 2022-06-21 RX ORDER — POTASSIUM CHLORIDE 750 MG/1
10 TABLET, EXTENDED RELEASE ORAL DAILY
Qty: 30 TABLET | Refills: 11 | Status: SHIPPED | OUTPATIENT
Start: 2022-06-21 | End: 2022-06-21 | Stop reason: HOSPADM

## 2022-06-21 RX ORDER — FUROSEMIDE 20 MG/1
20 TABLET ORAL DAILY
Qty: 30 TABLET | Refills: 11 | Status: ON HOLD | OUTPATIENT
Start: 2022-06-21 | End: 2022-09-09

## 2022-06-21 RX ORDER — FUROSEMIDE 40 MG/1
40 TABLET ORAL DAILY
Qty: 30 TABLET | Refills: 11 | Status: SHIPPED | OUTPATIENT
Start: 2022-06-22 | End: 2022-06-21 | Stop reason: HOSPADM

## 2022-06-21 RX ADMIN — APIXABAN 5 MG: 5 TABLET, FILM COATED ORAL at 08:06

## 2022-06-21 RX ADMIN — ALLOPURINOL 100 MG: 100 TABLET ORAL at 08:06

## 2022-06-21 RX ADMIN — AMIODARONE HYDROCHLORIDE 400 MG: 200 TABLET ORAL at 01:06

## 2022-06-21 RX ADMIN — PANTOPRAZOLE SODIUM 40 MG: 40 TABLET, DELAYED RELEASE ORAL at 08:06

## 2022-06-21 RX ADMIN — AMIODARONE HYDROCHLORIDE 400 MG: 200 TABLET ORAL at 08:06

## 2022-06-21 RX ADMIN — CITALOPRAM HYDROBROMIDE 10 MG: 10 TABLET ORAL at 08:06

## 2022-06-21 RX ADMIN — Medication 1000 UNITS: at 08:06

## 2022-06-21 RX ADMIN — TAMSULOSIN HYDROCHLORIDE 0.4 MG: 0.4 CAPSULE ORAL at 08:06

## 2022-06-21 RX ADMIN — FUROSEMIDE 40 MG: 10 INJECTION, SOLUTION INTRAMUSCULAR; INTRAVENOUS at 05:06

## 2022-06-21 RX ADMIN — AMIODARONE HYDROCHLORIDE 150 MG: 1.5 INJECTION, SOLUTION INTRAVENOUS at 11:06

## 2022-06-21 RX ADMIN — ATORVASTATIN CALCIUM 20 MG: 10 TABLET, FILM COATED ORAL at 08:06

## 2022-06-21 NOTE — DISCHARGE SUMMARY
[facility] - 6th Floor Medical Telemetry  Cardiology  Discharge Summary      Patient Name: Geo Barrow  MRN: [id number]  Admission Date: 6/19/2022  Hospital Length of Stay: 2 days  Discharge Date and Time:  06/21/2022 7:32 AM  Attending Physician: Janelle Flood MD    Discharging Provider: MARTA Burdick  Primary Care Physician: Deidra Briggs MD    HPI:        HPI: Mr. Barrow is an 82 y/o male who is known to [facility], Dr. Fragoso. The patient presented to [facility] on 6.18.22 with c/o SOB. The patient reported that he had a sudden onset of SOB and he presented to the ER for evaluation. Upon arrival to the ER the patient was found to have an O2 Saturation of 89% on RA. Initial Workup revealed a Troponin of 0.472, BNP 1118 and an EKG with SR/LBBB. He was given IV Lasix and admitted to [facility] for Diuresis.      Hospital Course:  6.20.22; Patient awake in bed. Reports SOB is improving but not back to baseline. Still with mild conversational dyspnea. Patient concerned about being on betablocker as his previous cardiology reported he had an allergy to betablockers as he began to experience episodes of heart failure post initiation of betablocker.   6.21.22: Patient awake in bed. Denies any complaints. Appears euvolemic.       TTE 06/15/2022  · The left ventricle is normal in size with mild concentric hypertrophy and low normal systolic function.  · The estimated ejection fraction is 50%.  · A diastolic pattern consistent with atrial fibrillation observed.  · There is abnormal septal wall motion consistent with left bundle branch block.  · Severe left atrial enlargement.  · There is a transcutaneously-placed aortic bioprosthesis present. There is no aortic insufficiency present. Prosthetic aortic valve is normal.  · The aortic valve mean gradient is 13 mmHg with a dimensionless index of 0.36.  · Moderate-to-severe mitral regurgitation.  · The estimated PA systolic pressure is 36 mmHg.  · Normal  central venous pressure (3 mmHg).       Indwelling Lines/Drains at time of discharge:  Lines/Drains/Airways     None               Significant Diagnostic Studies: Labs:   BMP:   Recent Labs   Lab 06/20/22  0437 06/21/22  0751    140   K 3.4* 3.4*   CO2 28 30   BUN 22.7 25.1   CREATININE 1.24* 1.53*   CALCIUM 8.9 8.9   MG 2.20  --     and CBC   Recent Labs   Lab 06/20/22  0437   WBC 6.3   HGB 9.0*   HCT 27.8*            Discharged Condition: stable    Disposition: Home or Self Care    Follow Up:   Follow-up Information     Reese Fragoso MD Follow up on 6/30/2022.    Specialties: Cardiovascular Disease, Cardiology  Why: 1:30  Contact information:  [address]  [facility] [address]  [phone]                       Impression:  Shortness of Breath  Acute on Chronic Diastolic HF/EF 50%. JVD to mandible on presentation with orthopnea and diuretic noncompliance x2 weeks.  VHD (Severe AS)    - s/p TAVR (6.14.22) - 26mm S3 Valve plus 2 cc Right TF Jean  CAD/Non-Obstructive  PAF/CVR    - CHADsVASc - 4 Points - 4.8% Stroke Risk per Year  Bilateral ASHLEY/Mild  HTN/HHD with Hx of HF/CKD Stage III  CKD Stage III  Prostate Cancer  BPH  Vitamin D Deficiency  Gout  Obesity  Former Smoker  Noncompliance with medical therapy    PLAN:   Appears euvolemic. Mild bump in renal indices.  Dc IV lasix. Start lasix 20mg daily.  Discussed possible BB allergy with . Instructed to continue betablocker.   Obtain  Daily Weights  Continue Preventice Monitor as ordered  Patient instructed to take lasix as instructed.   Consider referral to Dr. Logan for Mitral clipping    Patient Instructions:   Obtain daily weight and notify MD for weight gain > 2#/day or 5#/week. Notify MD for increasing Shortness of breath, peripheral edema, inability to lay flat due to breathing difficulty    Medications:  Reconciled Home Medications:      Medication List      CHANGE how you take these  medications    tamsulosin 0.4 mg Cap  Commonly known as: FLOMAX  Take by mouth once daily.  What changed: Another medication with the same name was removed. Continue taking this medication, and follow the directions you see here.        CONTINUE taking these medications    allopurinoL 100 MG tablet  Commonly known as: ZYLOPRIM  Take 1 tablet by mouth once daily at 6am.     amLODIPine 10 MG tablet  Commonly known as: NORVASC  Take 1 tablet (10 mg total) by mouth once daily at 6am.     apixaban 5 mg Tab  Commonly known as: ELIQUIS  Take 1 tablet (5 mg total) by mouth 2 (two) times daily.     atorvastatin 20 MG tablet  Commonly known as: LIPITOR  Take 1 tablet by mouth once daily at 6am.     citalopram 10 MG tablet  Commonly known as: CeleXA  Take 1 tablet by mouth once daily at 6am.     furosemide 20 MG tablet  Commonly known as: LASIX  Take 1 tablet (20 mg total) by mouth once daily. Last dose 5/30     pantoprazole 40 MG tablet  Commonly known as: PROTONIX  Take 1 tablet (40 mg total) by mouth 2 (two) times daily.     vitamin D 1000 units Tab  Commonly known as: VITAMIN D3  Take 1,000 Units by mouth once daily. nightly        STOP taking these medications    benazepriL 20 MG tablet  Commonly known as: LOTENSIN     finasteride 5 mg tablet  Commonly known as: PROSCAR     metoprolol tartrate 25 MG tablet  Commonly known as: LOPRESSOR     warfarin 2.5 MG tablet  Commonly known as: COUMADIN            Time spent on the discharge of patient: 30 minutes    MARTA Burdick  Cardiology  [facility] - 6th Floor Medical Telemetry

## 2022-06-21 NOTE — PROGRESS NOTES
Nutrition   Progress Note      Recommendations:  1. Continue Cardiac diet as tolerated      Reason for Evaluation:  Identified at risk by screening criteria    Diagnosis:    1. Acute congestive heart failure, unspecified heart failure type    2. Shortness of breath    3. Afib    4. A-fib        Relevant Medical History:    Past Medical History:   Diagnosis Date    Aortic valve stenosis     Atrial fibrillation     BPH (benign prostatic hyperplasia)     CAD (coronary artery disease)     Gout, unspecified     Hyperlipidemia     Hypertension     Stenosis of aortic and mitral valves     Vitamin D deficiency          Nutrition Diet History:    Factors affecting nutritional intake: none identified at this time    Food / Shinto / Culture Preferences:      Nutrition Prescription Ordered:    Current Diet Order: Cardiac    Appetite:  good    PO intake: 75 - 100 %      Labs / Medications / Procedures:    Nutrition Related Medications: Amiodarone, furosemide, Vit D    Nutrition Related Labs:  6/21: H/H-9.3/28.2, K-3.4, Crea-1.53, gluc-152      Anthropometrics:  Height: 6' (1.829 m)  Admit Weight:  Weight: 94.3 kg (208 lb)  Latest Weight:  94.3 kg (208 lb)    Wt Readings from Last 5 Encounters:   06/19/22 94.3 kg (208 lb)   06/14/22 99.1 kg (218 lb 7.6 oz)   06/06/22 96.2 kg (212 lb)   05/24/22 97 kg (213 lb 13.5 oz)   05/19/22 96.2 kg (212 lb)     IBW: 80.9kg  %IBW: 116%  UBW: 96kg  %Weight Change: stable  Body mass index is 28.21 kg/m².  BMI classification:  Overweight (BMI 25 - 29.9)      Nutrition Narrative:  6/21: Pt with good intake. Tolerating diet without problems.     Monitoring and Evaluation:    Nutrition Monitoring and Evaluation:  food and beverage intake    Nutrition Risk:  Level of Nutrition Risk:  Low  Frequency of Follow up:  Dietitian will f/up within 7 days.

## 2022-06-21 NOTE — PROGRESS NOTES
[facility] - Emergency Dept  Cardiology  History and Physical     Patient Name: Geo Barrow  MRN: [id number]  Admission Date: 6/19/2022  Code Status: Full Code   Attending Provider: Janelle Flood MD   Primary Care Physician: Deidra Briggs MD  Principal Problem:Acute diastolic heart failure    Patient information was obtained from patient, past medical records and ER records.     Subjective:     Chief Complaint: Shortness of Breath    HPI: Mr. Barrow is an 80 y/o male who is known to [facility], Dr. Fragoso. The patient presented to [facility] on 6.18.22 with c/o SOB. The patient reported that he had a sudden onset of SOB and he presented to the ER for evaluation. Upon arrival to the ER the patient was found to have an O2 Saturation of 89% on RA. Initial Workup revealed a Troponin of 0.472, BNP 1118 and an EKG with SR/LBBB. He was given IV Lasix and admitted to [facility] for Diuresis.     Hospital Course:  6.20.22; Patient awake in bed. Reports SOB is improving but not back to baseline. Still with mild conversational dyspnea. Patient concerned about being on betablocker as his previous cardiology reported he had an allergy to betablockers as he began to experience episodes of heart failure post initiation of betablocker.  6.21.22: Patient was to be discharged this am but went in to afib, moderate ventricular response 1teens and started on amiodarone      PMH: VHD (Severe AS s/p TAVR), CAD, Afib, L Sided ASHLEY/Mild, HLD, HTN/HHD, CKD Stage III, Prostate Cancer, BPH, Vitamin D Deficiency, Gout, Obesity, Tobacco Use  PSH: Inguinal Hernia Repair, Prostate Biopsy, Cataract Extraction, Angiogram, TAVR  Family History: Reviewed and Unremarkable for Heart Disease  Social History: Denies Illicit Drug and ETOH Use; Former Smoker, 1 ppd for years; Quit in 1975    Previous Cardiac Diagnostics:   ECHO 6.15.22:  The left ventricle is normal in size with mild concentric hypertrophy and low normal systolic function.  The  estimated ejection fraction is 50%.  A diastolic pattern consistent with atrial fibrillation observed.  There is abnormal septal wall motion consistent with left bundle branch block.  Severe left atrial enlargement.  There is a transcutaneously-placed aortic bioprosthesis present. There is no aortic insufficiency present. Prosthetic aortic valve is normal.  The aortic valve mean gradient is 13 mmHg with a dimensionless index of 0.36.  Moderate-to-severe mitral regurgitation.  The estimated PA systolic pressure is 36 mmHg.  Normal central venous pressure (3 mmHg).    ECHO 6.14.22:  There is a transcutaneously-placed aortic bioprosthesis present. There is no aortic insufficiency present. Prosthetic aortic valve is normal.     Ltd echo for PRE/ POST TAVR.   Post TAVR Mean PG= 6mmHg; Max velocity of 1.75m/s. No pericardial effusion noted.  Normal LV systolic function.    TAVR 6.14.22:  Procedures performed:  1. Transcatheter aortic valve replacement 26 mm S3 valve plus 2 cc right TF approach  2. Manta closure right CFA access site  3. Temporary pacemaker placement and removal  4. Limited transthoracic echo  5. Ultrasound-guided right radial, right groin access  6. TR band right radial access site     Findings:  1.  Root angiography post valve placement.  Low post dilatation was performed + 1 additional cc,  2. Limited echo post valve placement and post dilatation-mean gradient 6, trivial AI  3. Femoral angiography post closure no bleeding    Carotid US 4.8.22:  The study quality is average.   1-39% stenosis in the proximal right internal carotid artery based on Bluth Criteria.   1-39% stenosis in the proximal left internal carotid artery based on Bluth Criteria.   Antegrade right vertebral artery flow.   Antegrade left vertebral artery flow.     Review of patient's allergies indicates:  No Known Allergies    No current facility-administered medications on file prior to encounter.     Current Outpatient Medications on  File Prior to Encounter   Medication Sig    tamsulosin (FLOMAX) 0.4 mg Cap Take by mouth once daily.    allopurinoL (ZYLOPRIM) 100 MG tablet Take 1 tablet by mouth once daily at 6am.    apixaban (ELIQUIS) 5 mg Tab Take 1 tablet (5 mg total) by mouth 2 (two) times daily.    atorvastatin (LIPITOR) 20 MG tablet Take 1 tablet by mouth once daily at 6am.    citalopram (CELEXA) 10 MG tablet Take 1 tablet by mouth once daily at 6am.    pantoprazole (PROTONIX) 40 MG tablet Take 1 tablet (40 mg total) by mouth 2 (two) times daily.    vitamin D (VITAMIN D3) 1000 units Tab Take 1,000 Units by mouth once daily. nightly    [DISCONTINUED] benazepriL (LOTENSIN) 20 MG tablet Take 1 tablet by mouth once daily at 6am.    [DISCONTINUED] finasteride (PROSCAR) 5 mg tablet Take 1 tablet (5 mg total) by mouth once daily. (Patient not taking: Reported on 6/19/2022)    [DISCONTINUED] metoprolol tartrate (LOPRESSOR) 25 MG tablet Take 1 tablet (25 mg total) by mouth 2 (two) times daily.    [DISCONTINUED] warfarin (COUMADIN) 2.5 MG tablet Take 1 tablet by mouth daily as needed.     Family History     Problem Relation (Age of Onset)    Hypertension Mother    No Known Problems Father, Sister, Brother, Maternal Aunt, Maternal Uncle, Paternal Aunt, Paternal Uncle, Maternal Grandmother, Maternal Grandfather, Paternal Grandmother, Paternal Grandfather        Review of Systems   Constitutional: Negative for fever and malaise/fatigue.   Cardiovascular: Positive for dyspnea on exertion.   Respiratory: Positive for shortness of breath.    Musculoskeletal: Negative.    Gastrointestinal: Negative.    Genitourinary: Negative.    Neurological: Negative.    Psychiatric/Behavioral: Negative.    All other systems reviewed and are negative.    Objective:     Vital Signs (Most Recent):  Temp: 98.3 °F (36.8 °C) (06/21/22 0802)  Pulse: 108 (06/21/22 0802)  Resp: 18 (06/21/22 0802)  BP: 119/69 (06/21/22 0802)  SpO2: 95 % (06/21/22 0802) Vital Signs (24h  Range):  Temp:  [97.7 °F (36.5 °C)-98.5 °F (36.9 °C)] 98.3 °F (36.8 °C)  Pulse:  [] 108  Resp:  [17-21] 18  SpO2:  [93 %-98 %] 95 %  BP: (119-140)/(67-85) 119/69     Weight: 94.3 kg (208 lb)  Body mass index is 28.21 kg/m².    SpO2: 95 %  O2 Device (Oxygen Therapy): room air      Intake/Output Summary (Last 24 hours) at 6/21/2022 1046  Last data filed at 6/21/2022 0500  Gross per 24 hour   Intake 356 ml   Output 1275 ml   Net -919 ml       Lines/Drains/Airways     Peripheral Intravenous Line  Duration                Peripheral IV - Single Lumen 06/19/22 0000 18 G Left Forearm 2 days                Physical Exam  HENT:      Head: Normocephalic.      Mouth/Throat:      Mouth: Mucous membranes are moist.   Eyes:      Extraocular Movements: Extraocular movements intact.   Cardiovascular:      Rate and Rhythm: Regular rhythm. Tachycardia present.      Pulses: Normal pulses.      Heart sounds: Normal heart sounds.   Pulmonary:      Effort: Pulmonary effort is normal.      Breath sounds: Normal breath sounds.   Abdominal:      Palpations: Abdomen is soft.   Skin:     General: Skin is warm and dry.   Neurological:      General: No focal deficit present.      Mental Status: He is alert and oriented to person, place, and time.   Psychiatric:         Mood and Affect: Mood normal.         Behavior: Behavior normal.         Significant Labs:   BMP:   Recent Labs   Lab 06/20/22 0437 06/21/22  0751    140   K 3.4* 3.4*   CO2 28 30   BUN 22.7 25.1   CREATININE 1.24* 1.53*   CALCIUM 8.9 8.9   MG 2.20  --    , CMP   Recent Labs   Lab 06/20/22 0437 06/21/22  0751    140   K 3.4* 3.4*   CO2 28 30   BUN 22.7 25.1   CREATININE 1.24* 1.53*   CALCIUM 8.9 8.9   ALBUMIN 3.2*  --    BILITOT 1.4  --    ALKPHOS 84  --    AST 33  --    ALT 34  --    EGFRNONAA 60 47    and CBC   Recent Labs   Lab 06/20/22  0437 06/21/22  0956   WBC 6.3 7.0   HGB 9.0* 9.3*   HCT 27.8* 28.2*    202         Assessment and Plan:   [place]  of Breath  Acute on Chronic Diastolic HF/EF 50%. JVD to mandible on presentation with orthopnea and diuretic noncompliance x2 weeks.  VHD (Severe AS)    - s/p TAVR (6.14.22) - 26mm S3 Valve plus 2 cc Right TF Jean  CAD/Non-Obstructive  PAF/CVR    - CHADsVASc - 4 Points - 4.8% Stroke Risk per Year  Bilateral ASHLEY/Mild  HTN/HHD with Hx of HF/CKD Stage III  CKD Stage III  Prostate Cancer  BPH  Vitamin D Deficiency  Gout  Obesity  Former Smoker    PLAN:   Symptomatically improving. DC IV lasix. Start lasix 20mg daily.   Patient went into afib, RVR. Due to BB intolerance, will give amiodarone bolus and start oral load. Will continue amiodarone for 30 days then recommend switching to diltiazem.   Accurate I&Os and Daily Weights  Resume amlodipine 10 mg daily.       VTE Risk Mitigation (From admission, onward)         Ordered     apixaban tablet 5 mg  2 times daily         06/19/22 1215     IP VTE HIGH RISK PATIENT  Once         06/19/22 0250     Place sequential compression device  Until discontinued         06/19/22 0250              MARTA Burdick  Cardiology  [facility] - Emergency Dept  06/21/2022 11:45 AM

## 2022-06-22 VITALS
BODY MASS INDEX: 28.16 KG/M2 | RESPIRATION RATE: 20 BRPM | SYSTOLIC BLOOD PRESSURE: 135 MMHG | HEART RATE: 97 BPM | WEIGHT: 207.88 LBS | OXYGEN SATURATION: 97 % | HEIGHT: 72 IN | DIASTOLIC BLOOD PRESSURE: 77 MMHG | TEMPERATURE: 98 F

## 2022-06-22 LAB
ANION GAP SERPL CALC-SCNC: 6 MEQ/L
ANION GAP SERPL CALC-SCNC: 7 MEQ/L
BASOPHILS # BLD AUTO: 0.05 X10(3)/MCL (ref 0–0.2)
BASOPHILS NFR BLD AUTO: 0.9 %
BUN SERPL-MCNC: 25.8 MG/DL (ref 8.4–25.7)
BUN SERPL-MCNC: 27.7 MG/DL (ref 8.4–25.7)
CALCIUM SERPL-MCNC: 8.3 MG/DL (ref 8.8–10)
CALCIUM SERPL-MCNC: 8.5 MG/DL (ref 8.8–10)
CHLORIDE SERPL-SCNC: 86 MMOL/L (ref 98–107)
CHLORIDE SERPL-SCNC: 99 MMOL/L (ref 98–107)
CO2 SERPL-SCNC: 29 MMOL/L (ref 23–31)
CO2 SERPL-SCNC: 30 MMOL/L (ref 23–31)
CREAT SERPL-MCNC: 1.48 MG/DL (ref 0.73–1.18)
CREAT SERPL-MCNC: 1.53 MG/DL (ref 0.73–1.18)
CREAT/UREA NIT SERPL: 17
CREAT/UREA NIT SERPL: 18
EOSINOPHIL # BLD AUTO: 0.28 X10(3)/MCL (ref 0–0.9)
EOSINOPHIL NFR BLD AUTO: 5.2 %
ERYTHROCYTE [DISTWIDTH] IN BLOOD BY AUTOMATED COUNT: 13.3 % (ref 11.5–17)
GLUCOSE SERPL-MCNC: 140 MG/DL (ref 82–115)
GLUCOSE SERPL-MCNC: 171 MG/DL (ref 82–115)
HCT VFR BLD AUTO: 28 % (ref 42–52)
HGB BLD-MCNC: 9.1 GM/DL (ref 14–18)
IMM GRANULOCYTES # BLD AUTO: 0.03 X10(3)/MCL (ref 0–0.02)
IMM GRANULOCYTES NFR BLD AUTO: 0.6 % (ref 0–0.43)
LYMPHOCYTES # BLD AUTO: 0.47 X10(3)/MCL (ref 0.6–4.6)
LYMPHOCYTES NFR BLD AUTO: 8.7 %
MAGNESIUM SERPL-MCNC: 2 MG/DL (ref 1.6–2.6)
MCH RBC QN AUTO: 29.1 PG (ref 27–31)
MCHC RBC AUTO-ENTMCNC: 32.5 MG/DL (ref 33–36)
MCV RBC AUTO: 89.5 FL (ref 80–94)
MONOCYTES # BLD AUTO: 0.33 X10(3)/MCL (ref 0.1–1.3)
MONOCYTES NFR BLD AUTO: 6.1 %
NEUTROPHILS # BLD AUTO: 4.2 X10(3)/MCL (ref 2.1–9.2)
NEUTROPHILS NFR BLD AUTO: 78.5 %
NRBC BLD AUTO-RTO: 0 %
PLATELET # BLD AUTO: 200 X10(3)/MCL (ref 130–400)
PMV BLD AUTO: 10 FL (ref 9.4–12.4)
POTASSIUM SERPL-SCNC: 2.8 MMOL/L (ref 3.5–5.1)
POTASSIUM SERPL-SCNC: 3.9 MMOL/L (ref 3.5–5.1)
RBC # BLD AUTO: 3.13 X10(6)/MCL (ref 4.7–6.1)
SODIUM SERPL-SCNC: 121 MMOL/L (ref 136–145)
SODIUM SERPL-SCNC: 136 MMOL/L (ref 136–145)
WBC # SPEC AUTO: 5.4 X10(3)/MCL (ref 4.5–11.5)

## 2022-06-22 PROCEDURE — 83735 ASSAY OF MAGNESIUM: CPT | Performed by: NURSE PRACTITIONER

## 2022-06-22 PROCEDURE — 25000003 PHARM REV CODE 250: Performed by: INTERNAL MEDICINE

## 2022-06-22 PROCEDURE — 85025 COMPLETE CBC W/AUTO DIFF WBC: CPT | Performed by: NURSE PRACTITIONER

## 2022-06-22 PROCEDURE — 36415 COLL VENOUS BLD VENIPUNCTURE: CPT | Performed by: NURSE PRACTITIONER

## 2022-06-22 PROCEDURE — 80048 BASIC METABOLIC PNL TOTAL CA: CPT | Performed by: NURSE PRACTITIONER

## 2022-06-22 PROCEDURE — 25000003 PHARM REV CODE 250: Performed by: NURSE PRACTITIONER

## 2022-06-22 RX ORDER — POTASSIUM CHLORIDE 750 MG/1
10 TABLET, EXTENDED RELEASE ORAL DAILY
Qty: 30 TABLET | Refills: 3 | Status: SHIPPED | OUTPATIENT
Start: 2022-06-22 | End: 2022-08-25

## 2022-06-22 RX ORDER — POTASSIUM CHLORIDE 20 MEQ/1
40 TABLET, EXTENDED RELEASE ORAL ONCE
Status: COMPLETED | OUTPATIENT
Start: 2022-06-22 | End: 2022-06-22

## 2022-06-22 RX ORDER — BENAZEPRIL HYDROCHLORIDE 20 MG/1
20 TABLET ORAL DAILY
Qty: 30 TABLET | Refills: 11 | Status: SHIPPED | OUTPATIENT
Start: 2022-06-22 | End: 2022-08-25

## 2022-06-22 RX ORDER — AMIODARONE HYDROCHLORIDE 200 MG/1
TABLET ORAL
Qty: 45 TABLET | Refills: 11 | OUTPATIENT
Start: 2022-06-22 | End: 2022-08-25

## 2022-06-22 RX ADMIN — POTASSIUM CHLORIDE 40 MEQ: 1500 TABLET, EXTENDED RELEASE ORAL at 11:06

## 2022-06-22 RX ADMIN — FUROSEMIDE 40 MG: 40 TABLET ORAL at 09:06

## 2022-06-22 RX ADMIN — CITALOPRAM HYDROBROMIDE 10 MG: 10 TABLET ORAL at 09:06

## 2022-06-22 RX ADMIN — ALLOPURINOL 100 MG: 100 TABLET ORAL at 09:06

## 2022-06-22 RX ADMIN — PANTOPRAZOLE SODIUM 40 MG: 40 TABLET, DELAYED RELEASE ORAL at 09:06

## 2022-06-22 RX ADMIN — AMIODARONE HYDROCHLORIDE 400 MG: 200 TABLET ORAL at 09:06

## 2022-06-22 RX ADMIN — Medication 1000 UNITS: at 09:06

## 2022-06-22 RX ADMIN — APIXABAN 5 MG: 5 TABLET, FILM COATED ORAL at 09:06

## 2022-06-22 RX ADMIN — TAMSULOSIN HYDROCHLORIDE 0.4 MG: 0.4 CAPSULE ORAL at 09:06

## 2022-06-22 RX ADMIN — ATORVASTATIN CALCIUM 20 MG: 10 TABLET, FILM COATED ORAL at 09:06

## 2022-06-22 NOTE — PLAN OF CARE
Discharge documentation sent to [facility] via [facility]. Notified Lisa of discharge.

## 2022-06-22 NOTE — PLAN OF CARE
06/22/22 1215   Discharge Assessment   Assessment Type Discharge Planning Assessment   Confirmed/corrected address, phone number and insurance Yes   Source of Information patient;family   When was your last doctors appointment?   (Patient reports PCP appointment 1 montha ago.)   Lives With spouse   Do you expect to return to your current living situation? Yes   Do you have help at home or someone to help you manage your care at home? Yes   Prior to hospitilization cognitive status: Alert/Oriented   Current cognitive status: Alert/Oriented   Walking or Climbing Stairs Difficulty none   Dressing/Bathing Difficulty bathing difficulty, requires equipment   Equipment Currently Used at Home walker, rolling;bath bench   Readmission within 30 days? Yes   Do you currently have service(s) that help you manage your care at home? No   Do you take prescription medications? Yes   Do you have prescription coverage? Yes   Do you have any problems affording any of your prescribed medications? No   Who is going to help you get home at discharge? Spouse/daughter   Are you on dialysis? No   Do you take coumadin? No   Discharge Plan A Home Health   DME Needed Upon Discharge  none   Discharge Plan discussed with: Spouse/sig other;Patient;Adult children   Discharge Barriers Identified None   PCP is Deidra Briggs MD. Order for home health notified. List of in network providers given. FOC obtained for [facility]. [facility].

## 2022-06-22 NOTE — NURSING
Patient given discharge instructions, teachings, prescriptions sent, and follow up appointments. Patient verbalizes understanding. No complications or distress noted from patient. IV and tele dc'd. Patient awaiting wheelchair.

## 2022-06-22 NOTE — PLAN OF CARE
Problem: Adult Inpatient Plan of Care  Goal: Plan of Care Review  Outcome: Met  Goal: Patient-Specific Goal (Individualized)  Outcome: Met  Goal: Absence of Hospital-Acquired Illness or Injury  Outcome: Met  Goal: Optimal Comfort and Wellbeing  Outcome: Met  Goal: Readiness for Transition of Care  Outcome: Met     Problem: Activity Intolerance  Goal: Enhanced Capacity and Energy  Outcome: Met

## 2022-06-22 NOTE — PLAN OF CARE
06/22/22 1235   Medicare Message   Important Message from Medicare regarding Discharge Appeal Rights Given to patient/caregiver;Explained to patient/caregiver

## 2022-06-22 NOTE — DISCHARGE SUMMARY
[facility] - 6th Floor Medical Telemetry  Cardiology  Discharge Summary      Patient Name: Geo Barrow  MRN: [id number]  Admission Date: 6/19/2022  Hospital Length of Stay: 3 days  Discharge Date and Time:  06/22/2022 3:03 PM  Attending Physician: Janelle Flood MD    Discharging Provider: MARTA Burdick  Primary Care Physician: Deidra Briggs MD    HPI:   HPI: Mr. Barrow is an 82 y/o male who is known to [facility], Dr. Fragoso. The patient presented to [facility] on 6.18.22 with c/o SOB. The patient reported that he had a sudden onset of SOB and he presented to the ER for evaluation. Upon arrival to the ER the patient was found to have an O2 Saturation of 89% on RA. Initial Workup revealed a Troponin of 0.472, BNP 1118 and an EKG with SR/LBBB. He was given IV Lasix and admitted to [facility] for Diuresis.      Hospital Course:  6.20.22; Patient awake in bed. Reports SOB is improving but not back to baseline. Still with mild conversational dyspnea. Patient concerned about being on betablocker as his previous cardiology reported he had an allergy to betablockers as he began to experience episodes of heart failure post initiation of betablocker.  6.21.22: Patient was to be discharged this am but went in to afib, moderate ventricular response 1teens and started on amiodarone  6.22.22: Patient awake in bed. NAD. Currently Afib 90's-low 100's. K 2.8, Na 121- quite a difference from yesterdays labs. Appears compensated. Patient given 40meq KCL this morning and labs rechecked. , K+ now 3.9.     Indwelling Lines/Drains at time of discharge:  Lines/Drains/Airways     None  Physical Exam   Cardiovascular: Normal heart sounds. An irregular rhythm present. Tachycardia present.   Pulmonary/Chest: Breath sounds normal.   Abdominal: Soft.   Musculoskeletal:      Cervical back: Normal range of motion.   Skin: Skin is warm. Capillary refill takes 2 to 3 seconds.   Psychiatric: Mood normal.                   Significant Diagnostic Studies: Labs:   BMP:   Recent Labs   Lab 06/21/22  0751 06/22/22  0819 06/22/22  1350    121* 136   K 3.4* 2.8* 3.9   CO2 30 29 30   BUN 25.1 25.8* 27.7*   CREATININE 1.53* 1.48* 1.53*   CALCIUM 8.9 8.3* 8.5*   MG  --  2.00  --     and CBC   Recent Labs   Lab 06/21/22  0956 06/22/22  0819   WBC 7.0 5.4   HGB 9.3* 9.1*   HCT 28.2* 28.0*    200     Shortness of Breath  Acute on Chronic Diastolic HF/EF 50%. JVD to mandible on presentation with orthopnea and diuretic noncompliance x2 weeks.  VHD (Severe AS)    - s/p TAVR (6.14.22) - 26mm S3 Valve plus 2 cc Right TF Jean  CAD/Non-Obstructive  PAF/CVR    - CHADsVASc - 4 Points - 4.8% Stroke Risk per Year  Bilateral ASHLEY/Mild  HTN/HHD with Hx of HF/CKD Stage III  CKD Stage III  Prostate Cancer  BPH  Vitamin D Deficiency  Gout  Obesity  Former Smoker     PLAN:   Appears compensated. Continue lasix 20mg daily. BMP rechecked- labs stable.   Patient  Remains Atrial fib, 80's-110 bpm. Continue amiodarone oral load due to BB intolerance. Will continue amiodarone for 30 days then recommend switching to diltiazem.   Accurate I&Os and Daily Weights  Resume amlodipine 10 mg daily and benazepril 20mg daily. Will have home health check BMP on 6/25/22 and call results to [facility].     Discharged Condition: stable    Disposition: Home or Self Care    Follow Up:   Follow-up Information     Reese Fragoso MD Follow up on 6/30/2022.    Specialties: Cardiovascular Disease, Cardiology  Why: 1:30  Contact information:  [address]  [facility] [address]  [phone]             [facility] Follow up.    Specialty: Home Health Services  Contact information:  [phone]             Home health Follow up.    Why: collect BMP on 6/25/22 and notify [facility] of results                     Patient Instructions:      Diet Cardiac     Diet Cardiac     Notify your health care provider  if you experience any of the following:   Order Comments: Weight gain > 2# per day or 5# per week, increasing shortness or breath, abdominal bloating, peripheral edema, or inability to lay flat due to shortness of breath     Other restrictions (specify):   Order Comments: Obtain daily weights     Notify your health care provider if you experience any of the following:  difficulty breathing or increased cough   Order Comments: Notify MD for weight gain > 2# overnight or 5# per week, increasing shortness of breath, swelling to lower extremities, or inability to lay flat in bed     Activity as tolerated     Activity as tolerated     Medications:  Reconciled Home Medications:      Medication List      START taking these medications    amiodarone 200 MG Tab  Commonly known as: PACERONE  200 mg tablets  400 mg twice daily  for 3 days then decrease to 200mg twice daily for 3 days then decrease to 200mg daily thereafter     potassium chloride 10 MEQ Tbsr  Commonly known as: KLOR-CON  Take 1 tablet (10 mEq total) by mouth once daily.        CHANGE how you take these medications    tamsulosin 0.4 mg Cap  Commonly known as: FLOMAX  Take by mouth once daily.  What changed: Another medication with the same name was removed. Continue taking this medication, and follow the directions you see here.        CONTINUE taking these medications    allopurinoL 100 MG tablet  Commonly known as: ZYLOPRIM  Take 1 tablet by mouth once daily at 6am.     amLODIPine 10 MG tablet  Commonly known as: NORVASC  Take 1 tablet (10 mg total) by mouth once daily at 6am.     apixaban 5 mg Tab  Commonly known as: ELIQUIS  Take 1 tablet (5 mg total) by mouth 2 (two) times daily.     atorvastatin 20 MG tablet  Commonly known as: LIPITOR  Take 1 tablet by mouth once daily at 6am.     benazepriL 20 MG tablet  Commonly known as: LOTENSIN  Take 1 tablet (20 mg total) by mouth once daily at 6am.     citalopram 10 MG tablet  Commonly known as: CeleXA  Take 1  tablet by mouth once daily at 6am.     furosemide 20 MG tablet  Commonly known as: LASIX  Take 1 tablet (20 mg total) by mouth once daily. Last dose 5/30     pantoprazole 40 MG tablet  Commonly known as: PROTONIX  Take 1 tablet (40 mg total) by mouth 2 (two) times daily.     vitamin D 1000 units Tab  Commonly known as: VITAMIN D3  Take 1,000 Units by mouth once daily. nightly        STOP taking these medications    finasteride 5 mg tablet  Commonly known as: PROSCAR     metoprolol tartrate 25 MG tablet  Commonly known as: LOPRESSOR     warfarin 2.5 MG tablet  Commonly known as: COUMADIN            Time spent on the discharge of patient: 30 minutes    MARTA Burdick  Cardiology  [facility] - 6th Floor Medical Telemetry

## 2022-06-22 NOTE — PROGRESS NOTES
[facility]  Emergency Dept  Cardiology  History and Physical     Patient Name: Geo Barrow  MRN: [id number]  Admission Date: 6/19/2022  Code Status: Full Code   Attending Provider: Janelle Flood MD   Primary Care Physician: Deidra Briggs MD  Principal Problem:Acute diastolic heart failure    Patient information was obtained from patient, past medical records and ER records.     Subjective:     Chief Complaint: Shortness of Breath    HPI: Mr. Barrow is an 80 y/o male who is known to [facility], Dr. Fragoso. The patient presented to [facility] on 6.18.22 with c/o SOB. The patient reported that he had a sudden onset of SOB and he presented to the ER for evaluation. Upon arrival to the ER the patient was found to have an O2 Saturation of 89% on RA. Initial Workup revealed a Troponin of 0.472, BNP 1118 and an EKG with SR/LBBB. He was given IV Lasix and admitted to [facility] for Diuresis.     Hospital Course:  6.20.22; Patient awake in bed. Reports SOB is improving but not back to baseline. Still with mild conversational dyspnea. Patient concerned about being on betablocker as his previous cardiology reported he had an allergy to betablockers as he began to experience episodes of heart failure post initiation of betablocker.  6.21.22: Patient was to be discharged this am but went in to afib, moderate ventricular response 1teens and started on amiodarone  6.22.22: Patient awake in bed. NAD. Currently Afib 90's-low 100's. K 2.8, Na 121- quite a difference from yesterdays labs. Appears compensated.      PMH: VHD (Severe AS s/p TAVR), CAD, Afib, L Sided ASHLEY/Mild, HLD, HTN/HHD, CKD Stage III, Prostate Cancer, BPH, Vitamin D Deficiency, Gout, Obesity, Tobacco Use  PSH: Inguinal Hernia Repair, Prostate Biopsy, Cataract Extraction, Angiogram, TAVR  Family History: Reviewed and Unremarkable for Heart Disease  Social History: Denies Illicit Drug and ETOH Use; Former Smoker, 1 ppd for years; Quit in 1975    Previous Cardiac  Diagnostics:   ECHO 6.15.22:  The left ventricle is normal in size with mild concentric hypertrophy and low normal systolic function.  The estimated ejection fraction is 50%.  A diastolic pattern consistent with atrial fibrillation observed.  There is abnormal septal wall motion consistent with left bundle branch block.  Severe left atrial enlargement.  There is a transcutaneously-placed aortic bioprosthesis present. There is no aortic insufficiency present. Prosthetic aortic valve is normal.  The aortic valve mean gradient is 13 mmHg with a dimensionless index of 0.36.  Moderate-to-severe mitral regurgitation.  The estimated PA systolic pressure is 36 mmHg.  Normal central venous pressure (3 mmHg).    ECHO 6.14.22:  There is a transcutaneously-placed aortic bioprosthesis present. There is no aortic insufficiency present. Prosthetic aortic valve is normal.     Ltd echo for PRE/ POST TAVR.   Post TAVR Mean PG= 6mmHg; Max velocity of 1.75m/s. No pericardial effusion noted.  Normal LV systolic function.    TAVR 6.14.22:  Procedures performed:  1. Transcatheter aortic valve replacement 26 mm S3 valve plus 2 cc right TF approach  2. Manta closure right CFA access site  3. Temporary pacemaker placement and removal  4. Limited transthoracic echo  5. Ultrasound-guided right radial, right groin access  6. TR band right radial access site     Findings:  1.  Root angiography post valve placement.  Low post dilatation was performed + 1 additional cc,  2. Limited echo post valve placement and post dilatation-mean gradient 6, trivial AI  3. Femoral angiography post closure no bleeding    Carotid US 4.8.22:  The study quality is average.   1-39% stenosis in the proximal right internal carotid artery based on Bluth Criteria.   1-39% stenosis in the proximal left internal carotid artery based on Bluth Criteria.   Antegrade right vertebral artery flow.   Antegrade left vertebral artery flow.     Review of patient's allergies  indicates:  No Known Allergies    No current facility-administered medications on file prior to encounter.     Current Outpatient Medications on File Prior to Encounter   Medication Sig    tamsulosin (FLOMAX) 0.4 mg Cap Take by mouth once daily.    allopurinoL (ZYLOPRIM) 100 MG tablet Take 1 tablet by mouth once daily at 6am.    apixaban (ELIQUIS) 5 mg Tab Take 1 tablet (5 mg total) by mouth 2 (two) times daily.    atorvastatin (LIPITOR) 20 MG tablet Take 1 tablet by mouth once daily at 6am.    citalopram (CELEXA) 10 MG tablet Take 1 tablet by mouth once daily at 6am.    pantoprazole (PROTONIX) 40 MG tablet Take 1 tablet (40 mg total) by mouth 2 (two) times daily.    vitamin D (VITAMIN D3) 1000 units Tab Take 1,000 Units by mouth once daily. nightly    [DISCONTINUED] benazepriL (LOTENSIN) 20 MG tablet Take 1 tablet by mouth once daily at 6am.    [DISCONTINUED] finasteride (PROSCAR) 5 mg tablet Take 1 tablet (5 mg total) by mouth once daily. (Patient not taking: Reported on 6/19/2022)    [DISCONTINUED] metoprolol tartrate (LOPRESSOR) 25 MG tablet Take 1 tablet (25 mg total) by mouth 2 (two) times daily.    [DISCONTINUED] warfarin (COUMADIN) 2.5 MG tablet Take 1 tablet by mouth daily as needed.     Family History     Problem Relation (Age of Onset)    Hypertension Mother    No Known Problems Father, Sister, Brother, Maternal Aunt, Maternal Uncle, Paternal Aunt, Paternal Uncle, Maternal Grandmother, Maternal Grandfather, Paternal Grandmother, Paternal Grandfather        Review of Systems   Constitutional: Negative for fever and malaise/fatigue.   Cardiovascular: Negative for dyspnea on exertion.   Respiratory: Negative for shortness of breath.    Musculoskeletal: Negative.    Gastrointestinal: Negative.    Genitourinary: Negative.    Neurological: Negative.    Psychiatric/Behavioral: Negative.    All other systems reviewed and are negative.    Objective:     Vital Signs (Most Recent):  Temp: 97.8 °F (36.6  °C) (06/22/22 0744)  Pulse: (!) 114 (06/22/22 0744)  Resp: 18 (06/22/22 0744)  BP: 132/81 (06/22/22 0744)  SpO2: 95 % (06/22/22 0744) Vital Signs (24h Range):  Temp:  [97.8 °F (36.6 °C)-98.2 °F (36.8 °C)] 97.8 °F (36.6 °C)  Pulse:  [] 114  Resp:  [17-20] 18  SpO2:  [94 %-97 %] 95 %  BP: (112-138)/(60-85) 132/81     Weight: 94.3 kg (207 lb 14.3 oz)  Body mass index is 28.2 kg/m².    SpO2: 95 %  O2 Device (Oxygen Therapy): room air      Intake/Output Summary (Last 24 hours) at 6/22/2022 1205  Last data filed at 6/22/2022 0600  Gross per 24 hour   Intake 1940 ml   Output 2525 ml   Net -585 ml       Lines/Drains/Airways     Peripheral Intravenous Line  Duration                Peripheral IV - Single Lumen 06/19/22 0000 18 G Left Forearm 3 days                Physical Exam  HENT:      Head: Normocephalic.      Mouth/Throat:      Mouth: Mucous membranes are moist.   Eyes:      Extraocular Movements: Extraocular movements intact.   Cardiovascular:      Rate and Rhythm: Regular rhythm. Tachycardia present.      Pulses: Normal pulses.      Heart sounds: Normal heart sounds.   Pulmonary:      Effort: Pulmonary effort is normal.      Breath sounds: Normal breath sounds.   Abdominal:      Palpations: Abdomen is soft.   Skin:     General: Skin is warm and dry.   Neurological:      General: No focal deficit present.      Mental Status: He is alert and oriented to person, place, and time.   Psychiatric:         Mood and Affect: Mood normal.         Behavior: Behavior normal.         Significant Labs:   BMP:   Recent Labs   Lab 06/21/22  0751 06/22/22  0819    121*   K 3.4* 2.8*   CO2 30 29   BUN 25.1 25.8*   CREATININE 1.53* 1.48*   CALCIUM 8.9 8.3*   MG  --  2.00   , CMP   Recent Labs   Lab 06/21/22  0751 06/22/22  0819    121*   K 3.4* 2.8*   CO2 30 29   BUN 25.1 25.8*   CREATININE 1.53* 1.48*   CALCIUM 8.9 8.3*   EGFRNONAA 47 49    and CBC   Recent Labs   Lab 06/21/22  0956 06/22/22  0819   WBC 7.0 5.4   HGB  9.3* 9.1*   HCT 28.2* 28.0*    200         Assessment and Plan:   Shortness of Breath  Acute on Chronic Diastolic HF/EF 50%. JVD to mandible on presentation with orthopnea and diuretic noncompliance x2 weeks.  VHD (Severe AS)    - s/p TAVR (6.14.22) - 26mm S3 Valve plus 2 cc Right TF Jean  CAD/Non-Obstructive  PAF/CVR    - CHADsVASc - 4 Points - 4.8% Stroke Risk per Year  Bilateral ASHLEY/Mild  HTN/HHD with Hx of HF/CKD Stage III  CKD Stage III  Prostate Cancer  BPH  Vitamin D Deficiency  Gout  Obesity  Former Smoker    PLAN:   Appears compensated. Continue lasix 20mg daily. Will obtain BMP stat to check for accuracy.   Patient  Remains Atrial fib, moderate ventricular response. Continue amiodarone oral load due to BB intolerance. Will continue amiodarone for 30 days then recommend switching to diltiazem.   Accurate I&Os and Daily Weights  Resume amlodipine 10 mg daily.       VTE Risk Mitigation (From admission, onward)         Ordered     apixaban tablet 5 mg  2 times daily         06/19/22 1215     IP VTE HIGH RISK PATIENT  Once         06/19/22 0250     Place sequential compression device  Until discontinued         06/19/22 0250              MARTA Burdick  Cardiology  [facility] - Emergency Dept  06/22/2022 11:45 AM

## 2022-06-23 ENCOUNTER — PATIENT OUTREACH (OUTPATIENT)
Dept: ADMINISTRATIVE | Facility: CLINIC | Age: 82
End: 2022-06-23
Payer: MEDICARE

## 2022-06-23 NOTE — PROGRESS NOTES
C3 nurse spoke with patient's daughter/caregiver, Yasmeen for a TCC post hospital discharge follow up call. The patient has a scheduled HOSFU appointment with Dr. Fragoso on 06/30/2022 @ 130 pm.  Appointment @ [facility] 06/27/2022 @ 1000 am for blood work.  Advised daughter to call Deidra Briggs MD to make aware of recent hospitalization and schedule follow up appointment in 7-14 days; verbalized understanding.      Daughter stated picked up prescription from pharmacy for Lasix 40mg daily and 40 mg Lasix given this morning. Verified discharge instructions with daughter and she stated Lasix 20 mg ordered daily. Instructed to call Dr. Fragoso's office today to make aware 40 mg Lasix given and clarify Lasix dosage daily; verbalized understanding

## 2022-06-30 ENCOUNTER — OFFICE VISIT (OUTPATIENT)
Dept: CARDIAC SURGERY | Facility: CLINIC | Age: 82
End: 2022-06-30
Payer: MEDICARE

## 2022-06-30 VITALS
HEART RATE: 103 BPM | DIASTOLIC BLOOD PRESSURE: 67 MMHG | BODY MASS INDEX: 29.39 KG/M2 | WEIGHT: 217 LBS | SYSTOLIC BLOOD PRESSURE: 158 MMHG | HEIGHT: 72 IN

## 2022-06-30 DIAGNOSIS — R06.09 PLATYPNEA-ORTHODEOXIA SYNDROME: ICD-10-CM

## 2022-06-30 DIAGNOSIS — I35.0 AORTIC VALVE STENOSIS, ETIOLOGY OF CARDIAC VALVE DISEASE UNSPECIFIED: Primary | ICD-10-CM

## 2022-06-30 DIAGNOSIS — I35.0 NODULAR CALCIFIC AORTIC VALVE STENOSIS: Primary | ICD-10-CM

## 2022-06-30 PROCEDURE — 3078F DIAST BP <80 MM HG: CPT | Mod: CPTII,,, | Performed by: THORACIC SURGERY (CARDIOTHORACIC VASCULAR SURGERY)

## 2022-06-30 PROCEDURE — 3077F PR MOST RECENT SYSTOLIC BLOOD PRESSURE >= 140 MM HG: ICD-10-PCS | Mod: CPTII,,, | Performed by: THORACIC SURGERY (CARDIOTHORACIC VASCULAR SURGERY)

## 2022-06-30 PROCEDURE — 1101F PT FALLS ASSESS-DOCD LE1/YR: CPT | Mod: CPTII,,, | Performed by: THORACIC SURGERY (CARDIOTHORACIC VASCULAR SURGERY)

## 2022-06-30 PROCEDURE — 3077F SYST BP >= 140 MM HG: CPT | Mod: CPTII,,, | Performed by: THORACIC SURGERY (CARDIOTHORACIC VASCULAR SURGERY)

## 2022-06-30 PROCEDURE — 99212 PR OFFICE/OUTPT VISIT, EST, LEVL II, 10-19 MIN: ICD-10-PCS | Mod: ,,, | Performed by: THORACIC SURGERY (CARDIOTHORACIC VASCULAR SURGERY)

## 2022-06-30 PROCEDURE — 99212 OFFICE O/P EST SF 10 MIN: CPT | Mod: ,,, | Performed by: THORACIC SURGERY (CARDIOTHORACIC VASCULAR SURGERY)

## 2022-06-30 PROCEDURE — 1159F MED LIST DOCD IN RCRD: CPT | Mod: CPTII,,, | Performed by: THORACIC SURGERY (CARDIOTHORACIC VASCULAR SURGERY)

## 2022-06-30 PROCEDURE — 3288F PR FALLS RISK ASSESSMENT DOCUMENTED: ICD-10-PCS | Mod: CPTII,,, | Performed by: THORACIC SURGERY (CARDIOTHORACIC VASCULAR SURGERY)

## 2022-06-30 PROCEDURE — 1111F DSCHRG MED/CURRENT MED MERGE: CPT | Mod: CPTII,,, | Performed by: THORACIC SURGERY (CARDIOTHORACIC VASCULAR SURGERY)

## 2022-06-30 PROCEDURE — 3288F FALL RISK ASSESSMENT DOCD: CPT | Mod: CPTII,,, | Performed by: THORACIC SURGERY (CARDIOTHORACIC VASCULAR SURGERY)

## 2022-06-30 PROCEDURE — 1111F PR DISCHARGE MEDS RECONCILED W/ CURRENT OUTPATIENT MED LIST: ICD-10-PCS | Mod: CPTII,,, | Performed by: THORACIC SURGERY (CARDIOTHORACIC VASCULAR SURGERY)

## 2022-06-30 PROCEDURE — 3078F PR MOST RECENT DIASTOLIC BLOOD PRESSURE < 80 MM HG: ICD-10-PCS | Mod: CPTII,,, | Performed by: THORACIC SURGERY (CARDIOTHORACIC VASCULAR SURGERY)

## 2022-06-30 PROCEDURE — 1159F PR MEDICATION LIST DOCUMENTED IN MEDICAL RECORD: ICD-10-PCS | Mod: CPTII,,, | Performed by: THORACIC SURGERY (CARDIOTHORACIC VASCULAR SURGERY)

## 2022-06-30 PROCEDURE — 1101F PR PT FALLS ASSESS DOC 0-1 FALLS W/OUT INJ PAST YR: ICD-10-PCS | Mod: CPTII,,, | Performed by: THORACIC SURGERY (CARDIOTHORACIC VASCULAR SURGERY)

## 2022-06-30 NOTE — PROGRESS NOTES
Subjective:      Patient ID: Geo Barrow is a 81 y.o. male.    Chief Complaint: Post-op Evaluation (S/p TAVR 6/12/22)      HPI:  The patient states that he was recently hospitalized postoperatively for congestive heart failure and was diuresed approximately 4 L. he now states that he has shortness of breath with dyspnea on exertion and orthopnea.  His preoperative ejection fraction was 55%. He will be seeing his cardiologist this afternoon.  No local complaints at the femoral artery access      Current Outpatient Medications:     allopurinoL (ZYLOPRIM) 100 MG tablet, Take 1 tablet by mouth once daily at 6am., Disp: , Rfl:     amiodarone (PACERONE) 200 MG Tab, 200 mg tablets 400 mg twice daily  for 3 days then decrease to 200mg twice daily for 3 days then decrease to 200mg daily thereafter, Disp: 45 tablet, Rfl: 11    amLODIPine (NORVASC) 10 MG tablet, Take 1 tablet (10 mg total) by mouth once daily at 6am., Disp: 30 tablet, Rfl: 11    atorvastatin (LIPITOR) 20 MG tablet, Take 1 tablet by mouth once daily at 6am., Disp: , Rfl:     benazepriL (LOTENSIN) 20 MG tablet, Take 1 tablet (20 mg total) by mouth once daily at 6am., Disp: 30 tablet, Rfl: 11    citalopram (CELEXA) 10 MG tablet, Take 1 tablet by mouth once daily at 6am., Disp: , Rfl:     furosemide (LASIX) 20 MG tablet, Take 1 tablet (20 mg total) by mouth once daily. Last dose 5/30, Disp: 30 tablet, Rfl: 11    pantoprazole (PROTONIX) 40 MG tablet, Take 1 tablet (40 mg total) by mouth 2 (two) times daily., Disp: 60 tablet, Rfl: 5    potassium chloride (KLOR-CON) 10 MEQ TbSR, Take 1 tablet (10 mEq total) by mouth once daily., Disp: 30 tablet, Rfl: 3    tamsulosin (FLOMAX) 0.4 mg Cap, Take by mouth once daily., Disp: , Rfl:     vitamin D (VITAMIN D3) 1000 units Tab, Take 1,000 Units by mouth once daily. nightly, Disp: , Rfl:   Review of patient's allergies indicates:  No Known Allergies    BP (!) 158/67   Pulse 103   Ht 6' (1.829 m)   Wt 98.4 kg (217  lb)   BMI 29.43 kg/m²     Review of Systems   Constitutional: Negative.   HENT: Negative.    Eyes: Negative.    Cardiovascular: Negative.    Respiratory: Negative.    Endocrine: Negative.    Hematologic/Lymphatic: Negative.    Skin: Negative.    Musculoskeletal: Negative.    Gastrointestinal: Negative.    Genitourinary: Negative.    Neurological: Negative.    Psychiatric/Behavioral: Negative.    Allergic/Immunologic: Negative.        Objective:     Constitutional:  No acute distress  HENT:  Supple without mass.  Trachea midline.  No carotid bruits  Eyes:  PERRLA  Cardiovascular:  Irregularly irregular.  There is a soft systolic murmur  Respiratory:  Clear to auscultation  Endocrine:  Hematologic/Lymphatic:   Skin:  Warm and dry  Musculoskeletal:  No gross abnormalities  Gastrointestinal:   Genitourinary:   Neurological:  Normal  Psychiatric/Behavioral:   Extremities:  No cyanosis clubbing or edema    Assessment:  Postop TAVR.  Recent episode of systolic heart failure     Imaging:       Plan:  Patient will be seeing Dr. Fragoso today.  Return visit p.rWestleyn.

## 2022-08-04 DIAGNOSIS — I50.32 CHRONIC DIASTOLIC HF (HEART FAILURE): ICD-10-CM

## 2022-08-04 DIAGNOSIS — I34.0 NON-RHEUMATIC MITRAL REGURGITATION: Primary | ICD-10-CM

## 2022-08-05 ENCOUNTER — ANESTHESIA (OUTPATIENT)
Dept: CARDIOLOGY | Facility: HOSPITAL | Age: 82
End: 2022-08-05
Payer: MEDICARE

## 2022-08-05 ENCOUNTER — ANESTHESIA EVENT (OUTPATIENT)
Dept: CARDIOLOGY | Facility: HOSPITAL | Age: 82
End: 2022-08-05
Payer: MEDICARE

## 2022-08-05 ENCOUNTER — HOSPITAL ENCOUNTER (OUTPATIENT)
Dept: CARDIOLOGY | Facility: HOSPITAL | Age: 82
Discharge: HOME | End: 2022-08-05
Attending: INTERNAL MEDICINE
Payer: MEDICARE

## 2022-08-05 VITALS
RESPIRATION RATE: 21 BRPM | HEART RATE: 81 BPM | DIASTOLIC BLOOD PRESSURE: 89 MMHG | SYSTOLIC BLOOD PRESSURE: 121 MMHG | WEIGHT: 206.13 LBS | BODY MASS INDEX: 27.92 KG/M2 | TEMPERATURE: 98 F | HEIGHT: 72 IN | OXYGEN SATURATION: 96 %

## 2022-08-05 DIAGNOSIS — I34.0 SEVERE MITRAL REGURGITATION: ICD-10-CM

## 2022-08-05 DIAGNOSIS — I34.0 NON-RHEUMATIC MITRAL REGURGITATION: ICD-10-CM

## 2022-08-05 LAB — POCT GLUCOSE: 98 MG/DL (ref 70–110)

## 2022-08-05 PROCEDURE — 25000003 PHARM REV CODE 250: Performed by: NURSE ANESTHETIST, CERTIFIED REGISTERED

## 2022-08-05 PROCEDURE — 63600175 PHARM REV CODE 636 W HCPCS: Performed by: NURSE ANESTHETIST, CERTIFIED REGISTERED

## 2022-08-05 PROCEDURE — 93325 DOPPLER ECHO COLOR FLOW MAPG: CPT

## 2022-08-05 PROCEDURE — 37000009 HC ANESTHESIA EA ADD 15 MINS

## 2022-08-05 PROCEDURE — 37000008 HC ANESTHESIA 1ST 15 MINUTES

## 2022-08-05 RX ORDER — SPIRONOLACTONE 25 MG/1
12.5 TABLET ORAL NIGHTLY
COMMUNITY

## 2022-08-05 RX ORDER — FERROUS SULFATE 325(65) MG
325 TABLET ORAL NIGHTLY
COMMUNITY

## 2022-08-05 RX ORDER — SACUBITRIL AND VALSARTAN 24; 26 MG/1; MG/1
1 TABLET, FILM COATED ORAL 2 TIMES DAILY
COMMUNITY

## 2022-08-05 RX ORDER — PROPOFOL 10 MG/ML
VIAL (ML) INTRAVENOUS
Status: DISCONTINUED | OUTPATIENT
Start: 2022-08-05 | End: 2022-08-05

## 2022-08-05 RX ORDER — DAPAGLIFLOZIN 10 MG/1
10 TABLET, FILM COATED ORAL DAILY
COMMUNITY

## 2022-08-05 RX ORDER — METOPROLOL SUCCINATE 25 MG/1
25 TABLET, EXTENDED RELEASE ORAL DAILY
COMMUNITY

## 2022-08-05 RX ORDER — ETOMIDATE 2 MG/ML
INJECTION INTRAVENOUS
Status: DISCONTINUED | OUTPATIENT
Start: 2022-08-05 | End: 2022-08-05

## 2022-08-05 RX ORDER — DOCUSATE SODIUM 100 MG/1
100 CAPSULE, LIQUID FILLED ORAL NIGHTLY
COMMUNITY

## 2022-08-05 RX ORDER — TORSEMIDE 20 MG/1
20 TABLET ORAL DAILY
COMMUNITY
End: 2022-08-25

## 2022-08-05 RX ORDER — LIDOCAINE HYDROCHLORIDE 20 MG/ML
INJECTION INTRAVENOUS
Status: DISCONTINUED | OUTPATIENT
Start: 2022-08-05 | End: 2022-08-05

## 2022-08-05 RX ADMIN — PROPOFOL 50 MG: 10 INJECTION, EMULSION INTRAVENOUS at 09:08

## 2022-08-05 RX ADMIN — ETOMIDATE 10 MG: 2 INJECTION INTRAVENOUS at 09:08

## 2022-08-05 RX ADMIN — SODIUM CHLORIDE, SODIUM GLUCONATE, SODIUM ACETATE, POTASSIUM CHLORIDE AND MAGNESIUM CHLORIDE: 526; 502; 368; 37; 30 INJECTION, SOLUTION INTRAVENOUS at 09:08

## 2022-08-05 RX ADMIN — LIDOCAINE HYDROCHLORIDE 80 MG: 20 INJECTION INTRAVENOUS at 09:08

## 2022-08-05 NOTE — BRIEF OP NOTE
Transesophageal Echocardiogram in Cath Lab    Procedure: MANOHAR  Indication: Severe mitral regurgitation  Physician: Hiren Levine MD    Findings:  1. Normal LV function  2. Biatrial enlargement  3. Severe mitral regurgitation that appears to be degernerative in nature  4. Moderate tricuspid regurgitation  5. Normal functioning TAVR, mild aortic regurgitation  6. Mild plaque in aorta  7. No thrombus in LA appendage  8. Pulmonary venous flow reversal is present          There were no complications.    Full Report to Follow in [facility]:    Hiren Levine MD  08/05/2022, 10:17 AM

## 2022-08-05 NOTE — TRANSFER OF CARE
Anesthesia Transfer of Care Note    Patient: Geo Barrow    Procedure(s) Performed: * No procedures listed *    Anesthesia Type: general    Transport from OR: Transported from OR on room air with adequate spontaneous ventilation    Post pain: adequate analgesia    Post assessment: no apparent anesthetic complications and tolerated procedure well    Post vital signs: stable    Level of consciousness: awake and alert    Nausea/Vomiting: no nausea/vomiting    Complications: none    Transfer of care protocol was followed      Last vitals:   Visit Vitals  /64   Pulse 70   Temp 36.9 °C (98.4 °F) (Oral)   Ht 6' (1.829 m)   Wt 93.5 kg (206 lb 2.1 oz)   SpO2 (!) 94%   BMI 27.96 kg/m²

## 2022-08-05 NOTE — DISCHARGE SUMMARY
[facility] - Cath Lab Services  Discharge Note  Short Stay    * No procedures listed *    OUTCOME: Patient tolerated treatment/procedure well without complication and is now ready for discharge.    DISPOSITION: Home or Self Care    FINAL DIAGNOSIS:  Severe mitral regurgitation    FOLLOWUP: In clinic    DISCHARGE INSTRUCTIONS:  Activity as tolerated    TIME SPENT ON DISCHARGE: 20 minutes

## 2022-08-05 NOTE — ANESTHESIA PREPROCEDURE EVALUATION
08/05/2022  Geo Barrow is a 81 y.o., male s/p TAVR, has severe MR with recurrent CHF sxs.  EF 40-45%  Here for Eval for Mitraclip      Pre-op Assessment    I have reviewed the Patient Summary Reports.     I have reviewed the Nursing Notes. I have reviewed the NPO Status.   I have reviewed the Medications.     Review of Systems  Anesthesia Hx:  No problems with previous Anesthesia    Social:  Non-Smoker    Cardiovascular:   Hypertension Valvular problems/Murmurs, MR CAD   NYHA Classification IV    Pulmonary:   Shortness of breath        Physical Exam  General: Well nourished, Cooperative, Alert and Oriented    Airway:  Mallampati: II / II  Mouth Opening: Normal  TM Distance: Normal  Tongue: Normal  Neck ROM: Normal ROM    Dental:  Intact, Partial Dentures  Removed partial  Heart:  Rate: Normal  Rhythm: Regular Rhythm        Anesthesia Plan  Type of Anesthesia, risks & benefits discussed:    Anesthesia Type: Gen Natural Airway  Intra-op Monitoring Plan: Standard ASA Monitors  Post Op Pain Control Plan: multimodal analgesia  Induction:  IV  Informed Consent: Informed consent signed with the Patient representative and all parties understand the risks and agree with anesthesia plan.  All questions answered. Patient consented to blood products? Yes  ASA Score: 3  Day of Surgery Review of History & Physical: H&P Update referred to the surgeon/provider.    Ready For Surgery From Anesthesia Perspective.     .

## 2022-08-05 NOTE — INTERVAL H&P NOTE
The patient has been examined and the H&P has been reviewed:    I concur with the findings and no changes have occurred since H&P was written.    Consent on chart.        There are no hospital problems to display for this patient.

## 2022-08-08 LAB
BSA FOR ECHO PROCEDURE: 2.18 M2
DOP CALC MV VTI: 31.5 CM
EJECTION FRACTION: 55 %
LEFT ATRIUM SIZE: 6 CM
MR PISA EROA: 0.27 CM2
MV MEAN GRADIENT: 3 MMHG
MV PEAK GRADIENT: 12 MMHG
PISA MRMAX VEL: 4.57 M/S
PISA RADIUS: 0.8 CM
PISA TR MAX VEL: 3.37 M/S
PISA VN NYQUIST MS: 0.31 M/S
PISA VN NYQUIST: 0.31 M/S
RA WIDTH: 5.8 CM
TR MAX PG: 45 MMHG

## 2022-08-09 NOTE — ANESTHESIA POSTPROCEDURE EVALUATION
Anesthesia Post Evaluation    Patient: Geo Barrow    Procedure(s) Performed: * No procedures listed *    Final Anesthesia Type: general      Patient location during evaluation: PACU  Patient participation: Yes- Able to Participate  Level of consciousness: awake and alert  Post-procedure vital signs: reviewed and stable  Pain management: adequate  Airway patency: patent      Anesthetic complications: no      Cardiovascular status: hemodynamically stable  Respiratory status: unassisted  Hydration status: euvolemic  Follow-up not needed.          Vitals Value Taken Time   /90 08/05/22 1116   Temp 36.7 08/09/22 0706   Pulse 81 08/05/22 1115   Resp 21 08/05/22 1115   SpO2 95 % 08/05/22 1115   Vitals shown include unvalidated device data.      No case tracking events are documented in the log.      Pain/Julee Score: No data recorded

## 2022-08-11 DIAGNOSIS — I34.0 MR (MITRAL REGURGITATION): Primary | ICD-10-CM

## 2022-08-25 ENCOUNTER — OFFICE VISIT (OUTPATIENT)
Dept: CARDIAC SURGERY | Facility: CLINIC | Age: 82
End: 2022-08-25
Payer: MEDICARE

## 2022-08-25 VITALS
HEART RATE: 93 BPM | OXYGEN SATURATION: 98 % | DIASTOLIC BLOOD PRESSURE: 75 MMHG | HEIGHT: 72 IN | BODY MASS INDEX: 28.04 KG/M2 | SYSTOLIC BLOOD PRESSURE: 118 MMHG | WEIGHT: 207 LBS

## 2022-08-25 DIAGNOSIS — I34.0 MITRAL VALVE INSUFFICIENCY, UNSPECIFIED ETIOLOGY: Primary | ICD-10-CM

## 2022-08-25 DIAGNOSIS — I34.0 MR (MITRAL REGURGITATION): ICD-10-CM

## 2022-08-25 PROCEDURE — 1100F PTFALLS ASSESS-DOCD GE2>/YR: CPT | Mod: CPTII,,, | Performed by: THORACIC SURGERY (CARDIOTHORACIC VASCULAR SURGERY)

## 2022-08-25 PROCEDURE — 3288F FALL RISK ASSESSMENT DOCD: CPT | Mod: CPTII,,, | Performed by: THORACIC SURGERY (CARDIOTHORACIC VASCULAR SURGERY)

## 2022-08-25 PROCEDURE — 99213 PR OFFICE/OUTPT VISIT, EST, LEVL III, 20-29 MIN: ICD-10-PCS | Mod: ,,, | Performed by: THORACIC SURGERY (CARDIOTHORACIC VASCULAR SURGERY)

## 2022-08-25 PROCEDURE — 99213 OFFICE O/P EST LOW 20 MIN: CPT | Mod: ,,, | Performed by: THORACIC SURGERY (CARDIOTHORACIC VASCULAR SURGERY)

## 2022-08-25 PROCEDURE — 1100F PR PT FALLS ASSESS DOC 2+ FALLS/FALL W/INJURY/YR: ICD-10-PCS | Mod: CPTII,,, | Performed by: THORACIC SURGERY (CARDIOTHORACIC VASCULAR SURGERY)

## 2022-08-25 PROCEDURE — 3074F SYST BP LT 130 MM HG: CPT | Mod: CPTII,,, | Performed by: THORACIC SURGERY (CARDIOTHORACIC VASCULAR SURGERY)

## 2022-08-25 PROCEDURE — 1159F MED LIST DOCD IN RCRD: CPT | Mod: CPTII,,, | Performed by: THORACIC SURGERY (CARDIOTHORACIC VASCULAR SURGERY)

## 2022-08-25 PROCEDURE — 3288F PR FALLS RISK ASSESSMENT DOCUMENTED: ICD-10-PCS | Mod: CPTII,,, | Performed by: THORACIC SURGERY (CARDIOTHORACIC VASCULAR SURGERY)

## 2022-08-25 PROCEDURE — 3078F PR MOST RECENT DIASTOLIC BLOOD PRESSURE < 80 MM HG: ICD-10-PCS | Mod: CPTII,,, | Performed by: THORACIC SURGERY (CARDIOTHORACIC VASCULAR SURGERY)

## 2022-08-25 PROCEDURE — 3074F PR MOST RECENT SYSTOLIC BLOOD PRESSURE < 130 MM HG: ICD-10-PCS | Mod: CPTII,,, | Performed by: THORACIC SURGERY (CARDIOTHORACIC VASCULAR SURGERY)

## 2022-08-25 PROCEDURE — 1159F PR MEDICATION LIST DOCUMENTED IN MEDICAL RECORD: ICD-10-PCS | Mod: CPTII,,, | Performed by: THORACIC SURGERY (CARDIOTHORACIC VASCULAR SURGERY)

## 2022-08-25 PROCEDURE — 3078F DIAST BP <80 MM HG: CPT | Mod: CPTII,,, | Performed by: THORACIC SURGERY (CARDIOTHORACIC VASCULAR SURGERY)

## 2022-08-25 RX ORDER — TORSEMIDE 10 MG/1
40 TABLET ORAL DAILY
COMMUNITY

## 2022-08-25 RX ORDER — POTASSIUM CHLORIDE 750 MG/1
10 CAPSULE, EXTENDED RELEASE ORAL DAILY
COMMUNITY
Start: 2022-07-19

## 2022-08-25 NOTE — PROGRESS NOTES
Subjective:      Patient ID: Geo Barrow is a 82 y.o. male.    Chief Complaint: Post-op Problem and Follow-up (Consult for Mitral Clip- ref'd per Dr. Logan)      HPI:  The patient is 2 months postoperative from his transcatheter aortic valve replacement.  He is known to have severe mitral insufficiency and is now being evaluated for mitral clip.  In view of his chronic kidney disease, minimal disease in his coronary arteries and elevated surgical risk with the presence of a aortic bioprosthesis, I believe the patient would be a very good candidate for a mitral clip.      Current Outpatient Medications:     allopurinoL (ZYLOPRIM) 100 MG tablet, Take 1 tablet by mouth once daily at 6am., Disp: , Rfl:     apixaban (ELIQUIS) 5 mg Tab, Take 5 mg by mouth 2 (two) times daily., Disp: , Rfl:     atorvastatin (LIPITOR) 20 MG tablet, Take 20 mg by mouth every evening., Disp: , Rfl:     citalopram (CELEXA) 20 MG tablet, Take 1 tablet by mouth once daily at 6am., Disp: , Rfl:     dapagliflozin (FARXIGA) 10 mg tablet, Take 10 mg by mouth once daily., Disp: , Rfl:     docusate sodium (COLACE) 100 MG capsule, Take 100 mg by mouth nightly., Disp: , Rfl:     ferrous sulfate (FEOSOL) 325 mg (65 mg iron) Tab tablet, Take 325 mg by mouth nightly., Disp: , Rfl:     metoprolol succinate (TOPROL-XL) 25 MG 24 hr tablet, Take 25 mg by mouth once daily., Disp: , Rfl:     pantoprazole (PROTONIX) 40 MG tablet, Take 1 tablet (40 mg total) by mouth 2 (two) times daily., Disp: 60 tablet, Rfl: 5    potassium chloride (MICRO-K) 10 MEQ CpSR, Take 10 mEq by mouth once daily., Disp: , Rfl:     sacubitriL-valsartan (ENTRESTO) 24-26 mg per tablet, Take 1 tablet by mouth 2 (two) times daily., Disp: , Rfl:     spironolactone (ALDACTONE) 25 MG tablet, Take 12.5 mg by mouth nightly., Disp: , Rfl:     tamsulosin (FLOMAX) 0.4 mg Cap, Take 0.4 mg by mouth nightly., Disp: , Rfl:     vitamin D (VITAMIN D3) 1000 units Tab, Take 1,000 Units by mouth  once daily. nightly, Disp: , Rfl:     furosemide (LASIX) 20 MG tablet, Take 1 tablet (20 mg total) by mouth once daily. Last dose 5/30 (Patient not taking: Reported on 8/25/2022), Disp: 30 tablet, Rfl: 11    torsemide (DEMADEX) 10 MG Tab, Take 40 mg by mouth once daily., Disp: , Rfl:   Review of patient's allergies indicates:  No Known Allergies    /75   Pulse 93   Ht 6' (1.829 m)   Wt 93.9 kg (207 lb)   SpO2 98%   BMI 28.07 kg/m²     Review of Systems   Constitutional: Negative.   HENT: Negative.    Eyes: Negative.    Cardiovascular: Negative.    Respiratory: Negative.    Endocrine: Negative.    Hematologic/Lymphatic: Negative.    Skin: Negative.    Musculoskeletal: Negative.    Gastrointestinal: Negative.    Genitourinary: Negative.    Neurological: Negative.    Psychiatric/Behavioral: Negative.    Allergic/Immunologic: Negative.        Objective:     Constitutional:  No acute distress  HENT:   Supple without mass.  Trachea midline.  No carotid bruits  Eyes:  PERRLA  Cardiovascular:  Irregularly irregular.  There is an apical systolic murmur  Respiratory:  Clear to auscultation  Endocrine:  Hematologic/Lymphatic:   Skin: warm and dry  Musculoskeletal:  No gross deformity  Gastrointestinal:  Soft and nontender  Genitourinary:   Neurological:  normal  Psychiatric/Behavioral:   Extremities:  No cyanosis clubbing or edema    Assessment: mitral insufficiency.     Imaging:       Plan:  Proceed with mitral clip

## 2022-09-08 ENCOUNTER — HOSPITAL ENCOUNTER (OUTPATIENT)
Dept: RADIOLOGY | Facility: HOSPITAL | Age: 82
Discharge: HOME | DRG: 266 | End: 2022-09-08
Attending: INTERNAL MEDICINE
Payer: MEDICARE

## 2022-09-08 ENCOUNTER — ANESTHESIA EVENT (OUTPATIENT)
Dept: CARDIOLOGY | Facility: HOSPITAL | Age: 82
DRG: 266 | End: 2022-09-08
Payer: MEDICARE

## 2022-09-08 PROCEDURE — 93010 ELECTROCARDIOGRAM REPORT: CPT | Mod: ,,, | Performed by: INTERNAL MEDICINE

## 2022-09-08 PROCEDURE — 86920 COMPATIBILITY TEST SPIN: CPT | Performed by: INTERNAL MEDICINE

## 2022-09-08 PROCEDURE — 93010 EKG 12-LEAD: ICD-10-PCS | Mod: ,,, | Performed by: INTERNAL MEDICINE

## 2022-09-08 PROCEDURE — 71046 X-RAY EXAM CHEST 2 VIEWS: CPT | Mod: TC

## 2022-09-08 PROCEDURE — 93005 ELECTROCARDIOGRAM TRACING: CPT

## 2022-09-09 ENCOUNTER — ANESTHESIA (OUTPATIENT)
Dept: CARDIOLOGY | Facility: HOSPITAL | Age: 82
DRG: 266 | End: 2022-09-09
Payer: MEDICARE

## 2022-09-09 ENCOUNTER — HOSPITAL ENCOUNTER (INPATIENT)
Facility: HOSPITAL | Age: 82
LOS: 1 days | Discharge: HOME | DRG: 266 | End: 2022-09-10
Attending: INTERNAL MEDICINE | Admitting: INTERNAL MEDICINE
Payer: MEDICARE

## 2022-09-09 DIAGNOSIS — Z98.890 POST-OPERATIVE STATE: ICD-10-CM

## 2022-09-09 DIAGNOSIS — I34.0 SEVERE MITRAL REGURGITATION: Primary | ICD-10-CM

## 2022-09-09 DIAGNOSIS — I34.0 MITRAL INSUFFICIENCY: ICD-10-CM

## 2022-09-09 DIAGNOSIS — Z95.818 S/P MITRAL VALVE CLIP IMPLANTATION: ICD-10-CM

## 2022-09-09 DIAGNOSIS — I50.33 ACUTE ON CHRONIC DIASTOLIC HEART FAILURE: ICD-10-CM

## 2022-09-09 DIAGNOSIS — I34.0 MITRAL INCOMPETENCE: ICD-10-CM

## 2022-09-09 DIAGNOSIS — Z98.890 S/P MITRAL VALVE CLIP IMPLANTATION: ICD-10-CM

## 2022-09-09 DIAGNOSIS — Z01.818 PRE-OP TESTING: ICD-10-CM

## 2022-09-09 DIAGNOSIS — I35.0 AORTIC STENOSIS: Primary | ICD-10-CM

## 2022-09-09 DIAGNOSIS — Z09 POSTOP CHECK: ICD-10-CM

## 2022-09-09 LAB
ANION GAP SERPL CALC-SCNC: 9 MEQ/L
ANISOCYTOSIS BLD QL SMEAR: ABNORMAL
BASOPHILS # BLD AUTO: 0.07 X10(3)/MCL (ref 0–0.2)
BASOPHILS NFR BLD AUTO: 1.3 %
BUN SERPL-MCNC: 33.8 MG/DL (ref 8.4–25.7)
BURR CELLS (OLG): ABNORMAL
CALCIUM SERPL-MCNC: 8.6 MG/DL (ref 8.8–10)
CHLORIDE SERPL-SCNC: 102 MMOL/L (ref 98–107)
CO2 SERPL-SCNC: 24 MMOL/L (ref 23–31)
CREAT SERPL-MCNC: 1.88 MG/DL (ref 0.73–1.18)
CREAT/UREA NIT SERPL: 18
EOSINOPHIL # BLD AUTO: 0.32 X10(3)/MCL (ref 0–0.9)
EOSINOPHIL NFR BLD AUTO: 6.1 %
ERYTHROCYTE [DISTWIDTH] IN BLOOD BY AUTOMATED COUNT: 23.4 % (ref 11.5–17)
GFR SERPLBLD CREATININE-BSD FMLA CKD-EPI: 35 MLS/MIN/1.73/M2
GLUCOSE SERPL-MCNC: 127 MG/DL (ref 82–115)
HCT VFR BLD AUTO: 37 % (ref 42–52)
HGB BLD-MCNC: 11.4 GM/DL (ref 14–18)
IMM GRANULOCYTES # BLD AUTO: 0.03 X10(3)/MCL (ref 0–0.04)
IMM GRANULOCYTES NFR BLD AUTO: 0.6 %
LYMPHOCYTES # BLD AUTO: 0.59 X10(3)/MCL (ref 0.6–4.6)
LYMPHOCYTES NFR BLD AUTO: 11.2 %
MCH RBC QN AUTO: 26.1 PG (ref 27–31)
MCHC RBC AUTO-ENTMCNC: 30.8 MG/DL (ref 33–36)
MCV RBC AUTO: 84.7 FL (ref 80–94)
MONOCYTES # BLD AUTO: 0.37 X10(3)/MCL (ref 0.1–1.3)
MONOCYTES NFR BLD AUTO: 7 %
NEUTROPHILS # BLD AUTO: 3.9 X10(3)/MCL (ref 2.1–9.2)
NEUTROPHILS NFR BLD AUTO: 73.8 %
NRBC BLD AUTO-RTO: 0 %
PLATELET # BLD AUTO: 169 X10(3)/MCL (ref 130–400)
PLATELET # BLD EST: NORMAL 10*3/UL
PMV BLD AUTO: 10.8 FL (ref 7.4–10.4)
POC ACTIVATED CLOTTING TIME K: 132 SEC (ref 74–137)
POCT GLUCOSE: 101 MG/DL (ref 70–110)
POCT GLUCOSE: 114 MG/DL (ref 70–110)
POCT GLUCOSE: 121 MG/DL (ref 70–110)
POIKILOCYTOSIS BLD QL SMEAR: ABNORMAL
POTASSIUM SERPL-SCNC: 4.1 MMOL/L (ref 3.5–5.1)
RBC # BLD AUTO: 4.37 X10(6)/MCL (ref 4.7–6.1)
RBC MORPH BLD: ABNORMAL
SAMPLE: NORMAL
SODIUM SERPL-SCNC: 135 MMOL/L (ref 136–145)
TARGETS BLD QL SMEAR: ABNORMAL
TEAR DROP CELL (OLG): ABNORMAL
WBC # SPEC AUTO: 5.3 X10(3)/MCL (ref 4.5–11.5)

## 2022-09-09 PROCEDURE — 37000009 HC ANESTHESIA EA ADD 15 MINS: Performed by: INTERNAL MEDICINE

## 2022-09-09 PROCEDURE — 25000003 PHARM REV CODE 250: Performed by: INTERNAL MEDICINE

## 2022-09-09 PROCEDURE — 27201423 OPTIME MED/SURG SUP & DEVICES STERILE SUPPLY: Performed by: INTERNAL MEDICINE

## 2022-09-09 PROCEDURE — 37000008 HC ANESTHESIA 1ST 15 MINUTES: Performed by: INTERNAL MEDICINE

## 2022-09-09 PROCEDURE — 63600175 PHARM REV CODE 636 W HCPCS: Performed by: INTERNAL MEDICINE

## 2022-09-09 PROCEDURE — 93010 EKG 12-LEAD: ICD-10-PCS | Mod: 76,,, | Performed by: INTERNAL MEDICINE

## 2022-09-09 PROCEDURE — C1894 INTRO/SHEATH, NON-LASER: HCPCS | Performed by: INTERNAL MEDICINE

## 2022-09-09 PROCEDURE — 21400001 HC TELEMETRY ROOM

## 2022-09-09 PROCEDURE — 93005 ELECTROCARDIOGRAM TRACING: CPT

## 2022-09-09 PROCEDURE — C1889 IMPLANT/INSERT DEVICE, NOC: HCPCS | Performed by: INTERNAL MEDICINE

## 2022-09-09 PROCEDURE — 80048 BASIC METABOLIC PNL TOTAL CA: CPT | Performed by: INTERNAL MEDICINE

## 2022-09-09 PROCEDURE — 93010 ELECTROCARDIOGRAM REPORT: CPT | Mod: 76,,, | Performed by: INTERNAL MEDICINE

## 2022-09-09 PROCEDURE — 63600175 PHARM REV CODE 636 W HCPCS: Performed by: STUDENT IN AN ORGANIZED HEALTH CARE EDUCATION/TRAINING PROGRAM

## 2022-09-09 PROCEDURE — 25000003 PHARM REV CODE 250: Performed by: STUDENT IN AN ORGANIZED HEALTH CARE EDUCATION/TRAINING PROGRAM

## 2022-09-09 PROCEDURE — 85025 COMPLETE CBC W/AUTO DIFF WBC: CPT | Performed by: INTERNAL MEDICINE

## 2022-09-09 PROCEDURE — 36415 COLL VENOUS BLD VENIPUNCTURE: CPT | Performed by: INTERNAL MEDICINE

## 2022-09-09 PROCEDURE — C1769 GUIDE WIRE: HCPCS | Performed by: INTERNAL MEDICINE

## 2022-09-09 PROCEDURE — 33340 PERQ CLSR TCAT L ATR APNDGE: CPT | Performed by: INTERNAL MEDICINE

## 2022-09-09 DEVICE — SYS MITRACLIP G4 GUIDE: Type: IMPLANTABLE DEVICE | Site: HEART | Status: FUNCTIONAL

## 2022-09-09 RX ORDER — DOCUSATE SODIUM 100 MG/1
100 CAPSULE, LIQUID FILLED ORAL NIGHTLY
Status: DISCONTINUED | OUTPATIENT
Start: 2022-09-09 | End: 2022-09-10 | Stop reason: HOSPADM

## 2022-09-09 RX ORDER — TORSEMIDE 20 MG/1
40 TABLET ORAL DAILY
Status: DISCONTINUED | OUTPATIENT
Start: 2022-09-09 | End: 2022-09-10 | Stop reason: HOSPADM

## 2022-09-09 RX ORDER — METOPROLOL SUCCINATE 25 MG/1
25 TABLET, EXTENDED RELEASE ORAL DAILY
Status: DISCONTINUED | OUTPATIENT
Start: 2022-09-09 | End: 2022-09-10 | Stop reason: HOSPADM

## 2022-09-09 RX ORDER — MUPIROCIN 20 MG/G
OINTMENT TOPICAL DAILY
Status: DISCONTINUED | OUTPATIENT
Start: 2022-09-09 | End: 2022-09-09

## 2022-09-09 RX ORDER — PHENYLEPHRINE HCL IN 0.9% NACL 1 MG/10 ML
SYRINGE (ML) INTRAVENOUS
Status: DISCONTINUED | OUTPATIENT
Start: 2022-09-09 | End: 2022-09-09

## 2022-09-09 RX ORDER — PANTOPRAZOLE SODIUM 40 MG/1
40 TABLET, DELAYED RELEASE ORAL 2 TIMES DAILY
Status: DISCONTINUED | OUTPATIENT
Start: 2022-09-09 | End: 2022-09-10 | Stop reason: HOSPADM

## 2022-09-09 RX ORDER — METOCLOPRAMIDE HYDROCHLORIDE 5 MG/ML
10 INJECTION INTRAMUSCULAR; INTRAVENOUS EVERY 6 HOURS PRN
Status: DISCONTINUED | OUTPATIENT
Start: 2022-09-09 | End: 2022-09-10 | Stop reason: HOSPADM

## 2022-09-09 RX ORDER — SODIUM CHLORIDE 0.9 % (FLUSH) 0.9 %
10 SYRINGE (ML) INJECTION
Status: DISCONTINUED | OUTPATIENT
Start: 2022-09-09 | End: 2022-09-09

## 2022-09-09 RX ORDER — ACETAMINOPHEN 325 MG/1
650 TABLET ORAL EVERY 4 HOURS PRN
Status: DISCONTINUED | OUTPATIENT
Start: 2022-09-09 | End: 2022-09-10 | Stop reason: HOSPADM

## 2022-09-09 RX ORDER — CEFAZOLIN SODIUM 1 G/3ML
1 INJECTION, POWDER, FOR SOLUTION INTRAMUSCULAR; INTRAVENOUS
Status: DISCONTINUED | OUTPATIENT
Start: 2022-09-09 | End: 2022-09-09

## 2022-09-09 RX ORDER — HEPARIN SODIUM 1000 [USP'U]/ML
INJECTION, SOLUTION INTRAVENOUS; SUBCUTANEOUS
Status: DISCONTINUED | OUTPATIENT
Start: 2022-09-09 | End: 2022-09-09

## 2022-09-09 RX ORDER — LIDOCAINE HYDROCHLORIDE 20 MG/ML
INJECTION, SOLUTION EPIDURAL; INFILTRATION; INTRACAUDAL; PERINEURAL
Status: DISCONTINUED | OUTPATIENT
Start: 2022-09-09 | End: 2022-09-09

## 2022-09-09 RX ORDER — CITALOPRAM 20 MG/1
20 TABLET, FILM COATED ORAL DAILY
Status: DISCONTINUED | OUTPATIENT
Start: 2022-09-10 | End: 2022-09-10 | Stop reason: HOSPADM

## 2022-09-09 RX ORDER — ONDANSETRON HYDROCHLORIDE 2 MG/ML
INJECTION, SOLUTION INTRAMUSCULAR; INTRAVENOUS
Status: DISCONTINUED | OUTPATIENT
Start: 2022-09-09 | End: 2022-09-09

## 2022-09-09 RX ORDER — SODIUM CHLORIDE 9 MG/ML
100 INJECTION, SOLUTION INTRAVENOUS CONTINUOUS
Status: DISCONTINUED | OUTPATIENT
Start: 2022-09-09 | End: 2022-09-10

## 2022-09-09 RX ORDER — FENTANYL CITRATE 50 UG/ML
INJECTION, SOLUTION INTRAMUSCULAR; INTRAVENOUS
Status: DISCONTINUED | OUTPATIENT
Start: 2022-09-09 | End: 2022-09-09

## 2022-09-09 RX ORDER — CLOPIDOGREL BISULFATE 75 MG/1
75 TABLET ORAL DAILY
Status: DISCONTINUED | OUTPATIENT
Start: 2022-09-09 | End: 2022-09-10 | Stop reason: HOSPADM

## 2022-09-09 RX ORDER — LIDOCAINE HYDROCHLORIDE 10 MG/ML
1 INJECTION, SOLUTION EPIDURAL; INFILTRATION; INTRACAUDAL; PERINEURAL ONCE
Status: DISCONTINUED | OUTPATIENT
Start: 2022-09-09 | End: 2022-09-09

## 2022-09-09 RX ORDER — DAPAGLIFLOZIN 10 MG/1
10 TABLET, FILM COATED ORAL DAILY
Status: DISCONTINUED | OUTPATIENT
Start: 2022-09-09 | End: 2022-09-10 | Stop reason: HOSPADM

## 2022-09-09 RX ORDER — TAMSULOSIN HYDROCHLORIDE 0.4 MG/1
0.4 CAPSULE ORAL NIGHTLY
Status: DISCONTINUED | OUTPATIENT
Start: 2022-09-09 | End: 2022-09-10 | Stop reason: HOSPADM

## 2022-09-09 RX ORDER — ROCURONIUM BROMIDE 10 MG/ML
INJECTION, SOLUTION INTRAVENOUS
Status: DISCONTINUED | OUTPATIENT
Start: 2022-09-09 | End: 2022-09-09

## 2022-09-09 RX ORDER — FAMOTIDINE 10 MG/ML
20 INJECTION INTRAVENOUS ONCE
Status: DISCONTINUED | OUTPATIENT
Start: 2022-09-09 | End: 2022-09-09

## 2022-09-09 RX ORDER — GLUCAGON 1 MG
1 KIT INJECTION
Status: DISCONTINUED | OUTPATIENT
Start: 2022-09-09 | End: 2022-09-10 | Stop reason: HOSPADM

## 2022-09-09 RX ORDER — PROTAMINE SULFATE 10 MG/ML
INJECTION, SOLUTION INTRAVENOUS
Status: DISCONTINUED | OUTPATIENT
Start: 2022-09-09 | End: 2022-09-09

## 2022-09-09 RX ORDER — LANOLIN ALCOHOL/MO/W.PET/CERES
1 CREAM (GRAM) TOPICAL DAILY
Status: DISCONTINUED | OUTPATIENT
Start: 2022-09-09 | End: 2022-09-10 | Stop reason: HOSPADM

## 2022-09-09 RX ORDER — PROPOFOL 10 MG/ML
VIAL (ML) INTRAVENOUS
Status: DISCONTINUED | OUTPATIENT
Start: 2022-09-09 | End: 2022-09-09

## 2022-09-09 RX ORDER — IBUPROFEN 200 MG
24 TABLET ORAL
Status: DISCONTINUED | OUTPATIENT
Start: 2022-09-09 | End: 2022-09-10 | Stop reason: HOSPADM

## 2022-09-09 RX ORDER — ATORVASTATIN CALCIUM 10 MG/1
20 TABLET, FILM COATED ORAL NIGHTLY
Status: DISCONTINUED | OUTPATIENT
Start: 2022-09-09 | End: 2022-09-10 | Stop reason: HOSPADM

## 2022-09-09 RX ORDER — IBUPROFEN 200 MG
16 TABLET ORAL
Status: DISCONTINUED | OUTPATIENT
Start: 2022-09-09 | End: 2022-09-10 | Stop reason: HOSPADM

## 2022-09-09 RX ORDER — ALLOPURINOL 100 MG/1
100 TABLET ORAL DAILY
Status: DISCONTINUED | OUTPATIENT
Start: 2022-09-10 | End: 2022-09-10 | Stop reason: HOSPADM

## 2022-09-09 RX ORDER — ONDANSETRON 2 MG/ML
INJECTION INTRAMUSCULAR; INTRAVENOUS
Status: DISCONTINUED | OUTPATIENT
Start: 2022-09-09 | End: 2022-09-09

## 2022-09-09 RX ORDER — ONDANSETRON 2 MG/ML
4 INJECTION INTRAMUSCULAR; INTRAVENOUS EVERY 6 HOURS PRN
Status: DISCONTINUED | OUTPATIENT
Start: 2022-09-09 | End: 2022-09-10 | Stop reason: HOSPADM

## 2022-09-09 RX ORDER — ETOMIDATE 2 MG/ML
INJECTION INTRAVENOUS
Status: DISCONTINUED | OUTPATIENT
Start: 2022-09-09 | End: 2022-09-09

## 2022-09-09 RX ORDER — CHOLECALCIFEROL (VITAMIN D3) 25 MCG
1000 TABLET ORAL DAILY
Status: DISCONTINUED | OUTPATIENT
Start: 2022-09-09 | End: 2022-09-10 | Stop reason: HOSPADM

## 2022-09-09 RX ADMIN — CLOPIDOGREL 75 MG: 75 TABLET, FILM COATED ORAL at 04:09

## 2022-09-09 RX ADMIN — Medication 100 MCG: at 08:09

## 2022-09-09 RX ADMIN — DAPAGLIFLOZIN 10 MG: 10 TABLET, FILM COATED ORAL at 06:09

## 2022-09-09 RX ADMIN — Medication 100 MCG: at 07:09

## 2022-09-09 RX ADMIN — SACUBITRIL AND VALSARTAN 1 TABLET: 24; 26 TABLET, FILM COATED ORAL at 09:09

## 2022-09-09 RX ADMIN — HEPARIN SODIUM 7500 UNITS: 1000 INJECTION, SOLUTION INTRAVENOUS; SUBCUTANEOUS at 07:09

## 2022-09-09 RX ADMIN — ROCURONIUM BROMIDE 50 MG: 10 SOLUTION INTRAVENOUS at 07:09

## 2022-09-09 RX ADMIN — ATORVASTATIN CALCIUM 20 MG: 10 TABLET, FILM COATED ORAL at 09:09

## 2022-09-09 RX ADMIN — SODIUM CHLORIDE, SODIUM GLUCONATE, SODIUM ACETATE, POTASSIUM CHLORIDE AND MAGNESIUM CHLORIDE: 526; 502; 368; 37; 30 INJECTION, SOLUTION INTRAVENOUS at 07:09

## 2022-09-09 RX ADMIN — PANTOPRAZOLE SODIUM 40 MG: 40 TABLET, DELAYED RELEASE ORAL at 09:09

## 2022-09-09 RX ADMIN — DOCUSATE SODIUM 100 MG: 100 CAPSULE, LIQUID FILLED ORAL at 09:09

## 2022-09-09 RX ADMIN — FERROUS SULFATE TAB 325 MG (65 MG ELEMENTAL FE) 1 EACH: 325 (65 FE) TAB at 04:09

## 2022-09-09 RX ADMIN — FENTANYL CITRATE 100 MCG: 50 INJECTION, SOLUTION INTRAMUSCULAR; INTRAVENOUS at 07:09

## 2022-09-09 RX ADMIN — TAMSULOSIN HYDROCHLORIDE 0.4 MG: 0.4 CAPSULE ORAL at 09:09

## 2022-09-09 RX ADMIN — MUPIROCIN: 20 OINTMENT TOPICAL at 07:09

## 2022-09-09 RX ADMIN — CEFAZOLIN 1 G: 330 INJECTION, POWDER, FOR SOLUTION INTRAMUSCULAR; INTRAVENOUS at 07:09

## 2022-09-09 RX ADMIN — LIDOCAINE HYDROCHLORIDE 80 MG: 20 INJECTION, SOLUTION EPIDURAL; INFILTRATION; INTRACAUDAL; PERINEURAL at 07:09

## 2022-09-09 RX ADMIN — HEPARIN SODIUM 2500 UNITS: 1000 INJECTION, SOLUTION INTRAVENOUS; SUBCUTANEOUS at 08:09

## 2022-09-09 RX ADMIN — APIXABAN 5 MG: 5 TABLET, FILM COATED ORAL at 09:09

## 2022-09-09 RX ADMIN — SPIRONOLACTONE 12.5 MG: 25 TABLET, FILM COATED ORAL at 09:09

## 2022-09-09 RX ADMIN — PROPOFOL 50 MG: 10 INJECTION, EMULSION INTRAVENOUS at 07:09

## 2022-09-09 RX ADMIN — Medication 1000 UNITS: at 04:09

## 2022-09-09 RX ADMIN — ETOMIDATE 14 MG: 2 INJECTION INTRAVENOUS at 07:09

## 2022-09-09 RX ADMIN — TORSEMIDE 40 MG: 20 TABLET ORAL at 04:09

## 2022-09-09 RX ADMIN — SUGAMMADEX 200 MG: 100 INJECTION, SOLUTION INTRAVENOUS at 08:09

## 2022-09-09 RX ADMIN — PROTAMINE SULFATE 50 MG: 10 INJECTION, SOLUTION INTRAVENOUS at 08:09

## 2022-09-09 RX ADMIN — ONDANSETRON 4 MG: 2 INJECTION INTRAMUSCULAR; INTRAVENOUS at 07:09

## 2022-09-09 NOTE — TRANSFER OF CARE
Anesthesia Transfer of Care Note    Patient: Geo Barrow    Procedure(s) Performed: Procedure(s) (LRB):  MITRAL CLIP (N/A)  ECHOCARDIOGRAM,TRANSESOPHAGEAL (N/A)    Patient location: PACU    Anesthesia Type: general    Transport from OR: Transported from OR on 2-3 L/min O2 by NC with adequate spontaneous ventilation    Post pain: adequate analgesia    Post assessment: no apparent anesthetic complications and tolerated procedure well    Post vital signs: stable    Level of consciousness: awake    Nausea/Vomiting: no nausea/vomiting    Complications: none    Transfer of care protocol was followed      Last vitals:   Visit Vitals  BP (!) 147/71   Pulse 102   Temp 36.8 °C (98.2 °F) (Oral)   Ht 6' (1.829 m)   Wt 91.7 kg (202 lb 2.6 oz)   SpO2 95%   BMI 27.42 kg/m²

## 2022-09-09 NOTE — ANESTHESIA PROCEDURE NOTES
Arterial    Diagnosis: MR    Patient location during procedure: done in OR  Procedure start time: 9/9/2022 7:27 AM  Timeout: 9/9/2022 7:25 AM  Procedure end time: 9/9/2022 7:30 AM    Staffing  Authorizing Provider: Kana Romano DO  Performing Provider: Mark Gonzales CRNA    Anesthesiologist was present at the time of the procedure.    Preanesthetic Checklist  Completed: patient identified, IV checked, site marked, risks and benefits discussed, surgical consent, monitors and equipment checked, pre-op evaluation, timeout performed and anesthesia consent givenArterial  Skin Prep: chlorhexidine gluconate  Local Infiltration: none  Orientation: left  Location: radial    Catheter Size: 20 G  Catheter placement by Anatomical landmarks. Heme positive aspiration all ports. Insertion Attempts: 1  Assessment  Dressing: secured with tape and tegaderm  Patient: Tolerated well

## 2022-09-09 NOTE — OP NOTE
[facility]  Mitral Clip Operative Note     SUMMARY     Surgery Date: 9/9/2022     Surgeon(s) and Role:  Panel 1:     * All Logan MD - Primary     * Deuce Henry Jr., MD - Assisting     * Hiren Levine MD  Panel 2:     * [facility] Diagnostic Provider - Primary    Pre-op Diagnosis:  Acute on chronic diastolic heart failure [I50.33]  Mitral incompetence [I34.0]    Post-op Diagnosis:  Post-Op Diagnosis Codes:     * Acute on chronic diastolic heart failure [I50.33]     * Mitral incompetence [I34.0]    Physicians:  -IC:All Logan MD  -Echocardiographer: Tk Levine MD    Procedure(s) (LRB):  MITRAL CLIP (N/A)  ECHOCARDIOGRAM,TRANSESOPHAGEAL (N/A)    Anesthesia: General    Access: (R) femoral Vein    Estimated Blood Loss: * No values recorded between 9/9/2022  7:40 AM and 9/9/2022  8:31 AM *                                                                                                                                                   Total Number of Clips: 1    1st  Clip Type: XTw    Pre op MR: +4    Post Procedural MR: trace-1    Post Op Graident: 3 mmHg    Closing V wave max: 3mmHg    Closing Mean LA pressure : 8mmHg    Complications: none    Placement: Telemetry    PROCEDURE: Following informed consent, patient was brought to the hybrid room, general anesthesia was instituted and a MANOHAR probe was advanced by Dr Levine. A thorough pre Clip evaluation was made by him. Please refer to his note under a separate cover. Access was obtained right femoral vein. A 0.32' wire was advanced into the the superior vena cava. 7,500u of heparin was given. A SL1 sheath and dilator were then advanced over the wire, into the SVC. The wire was removed and replaced with a brockenbrough needle that was advanced to the tip of the system. The entire system was then withdrawn enbloc to engage the atrial septum. MANOHAR guidance was then utilized to find a location between 4-5cm above the mitral  "valve coaptation and slightly posterior. Once this was obtained and confirmed by MANOHAR, the Needle was then carefully advance across the septum into the left atrium. With the needle held in place the sheath and dilator were advanced into the left atrium. The needle was removed and the system was de-aired. ACT's was obtained, and additional heparin was given as needed to achieve an ACT great then 250 seconds. Serial ACTs were obtained throughout the procedure at 30min intervals, maintaining the ACT above 250secs.     Opening LA Pressures were obtained a "v" wave of 13mmHg and a Mean 13mmHg.      Once the ACT was 250+ seconds an Amplatz SuperStiff wire was advanced into the left atrium in the Left upper pulmonary vein. The sheath was removed. The femoral access site was then then serially dilated with vessel dilators to 24F.  A MitraClip guiding catheter was then prep and brought to the table and advanced over the Amplatz wire, crossing the atrial septum with MANOHAR guidance. The dilator was removed and the system de-aired.     A Xtw MitraClip and delivery system was then prep and brought to the table and advanced through the guide into the LA. The system was the maneuvered to be in a perpendicular to the mitral valve plain and above the major jet. The clip was opened, rotated and translated into the correct positon perpendicular to the line of coaptation. Gripper verification was performed. Once this was confirmed with MANOHAR , the clip was partially closed and advanced into the LV. The Clip was than re-opened to 120 degrees and withdrawn to engage the anterior and posterior leaflets, when good insertion of the leaflets was noted the grippers were dropped and the clip partially closed. MANOHAR evaluation was then made to confirm insertion was appropriate and adequate for both leaflets. With satisfaction that the leaflets were adequately seated the clip was then closed completely.  MANOHAR evaluation was performed to ascertain the " "degree of reduction and any gradient across the mitral valve.   The Clip was re-positioned as needed to maximize the MR reduction. With an adequate change and reduction in MR and an acceptable gradient the clip was released in a usual fashion.       Completion MANOHAR was performed including residual MR and etiology and gradient (see Above)     Repeat LA Pressures were obtained a "v" wave of 3mmHg and a Mean 13mmHg were noted at the close of the case.    The delivery system was then withdrawn into the guide and removed from the body. The guide was then withdrawn into the IVC under fluoroscopic guidance. A 0.35 wire was then advanced through the guide, the guide was removed, a large dilator was inserted over the wire and a Figure-of-8 stitch was utilzed to achieve hemostasis and the dilator and wire were withdrawn.   Heparin was reversed with protamine.         Plan: PACU Surveillance, transfer to Telemetry   Post Mitral Valve intervention surveillance upon discharge          All Logan MD, FACC, FACP  Interventional Cardiology  Structural Heart Team  [facility] - Cath Lab Services         "

## 2022-09-09 NOTE — ANESTHESIA PREPROCEDURE EVALUATION
09/09/2022  Geo Barrow is a 82 y.o., male.      Pre-op Assessment    I have reviewed the Patient Summary Reports.     I have reviewed the Nursing Notes. I have reviewed the NPO Status.   I have reviewed the Medications.     Review of Systems  Anesthesia Hx:  No problems with previous Anesthesia    Cardiovascular:   Hypertension Valvular problems/Murmurs, MR CAD  Dysrhythmias ( 6/14/2022) atrial fibrillation CHF (07/2022) hyperlipidemia Hx severe MR  PISA 0.8cm  Echo 6/15/22  EF 50%  Severe LAE  Septal wall motion consistent with LBBB    Hx severe AS  Hx TAVR   Pulmonary:   Denies COPD.  Denies Asthma.    Renal/:   Chronic Renal Disease, CRI    Hepatic/GI:   GERD, well controlled Denies Liver Disease. Denies Hepatitis.    Neurological:   Denies CVA. Denies Seizures.    Endocrine:   Denies Diabetes. Denies Hypothyroidism. Denies Hyperthyroidism.  Denies Obesity / BMI > 30      Physical Exam  General: Well nourished, Cooperative, Alert and Oriented    Airway:  Mallampati: I   Mouth Opening: Normal  TM Distance: Normal  Tongue: Normal    Dental:        Anesthesia Plan  Type of Anesthesia, risks & benefits discussed:    Anesthesia Type: Gen ETT  Intra-op Monitoring Plan: Standard ASA Monitors and Art Line  Induction:  IV  Informed Consent: Informed consent signed with the Patient and all parties understand the risks and agree with anesthesia plan.  All questions answered.   ASA Score: 4  Day of Surgery Review of History & Physical: H&P Update referred to the surgeon/provider.    Ready For Surgery From Anesthesia Perspective.     .

## 2022-09-09 NOTE — ANESTHESIA PROCEDURE NOTES
Intubation    Date/Time: 9/9/2022 7:36 AM  Performed by: Mark Gonzales CRNA  Authorized by: Kana Romano DO     Intubation:     Induction:  Intravenous    Intubated:  Postinduction    Mask Ventilation:  Easy mask    Attempts:  1    Attempted By:  CRNA    Method of Intubation:  Video laryngoscopy    Blade:  Estrada 4    Laryngeal View Grade: Grade I - full view of cords      Difficult Airway Encountered?: No      Complications:  None    Airway Device:  Oral endotracheal tube    Airway Device Size:  8.0    Style/Cuff Inflation:  Cuffed (inflated to minimal occlusive pressure)    Tube secured:  22    Secured at:  The lips    Placement Verified By:  Capnometry    Complicating Factors:  None    Findings Post-Intubation:  BS equal bilateral and atraumatic/condition of teeth unchanged

## 2022-09-09 NOTE — ANESTHESIA POSTPROCEDURE EVALUATION
Anesthesia Post Evaluation    Patient: Geo Barrow    Procedure(s) Performed: Procedure(s) (LRB):  MITRAL CLIP (N/A)  ECHOCARDIOGRAM,TRANSESOPHAGEAL (N/A)    Final Anesthesia Type: general      Patient location during evaluation: PACU  Patient participation: Yes- Able to Participate  Level of consciousness: awake and alert and oriented  Post-procedure vital signs: reviewed and stable  Pain management: adequate  Airway patency: patent  TELMA mitigation strategies: Verification of full reversal of neuromuscular block  PONV status at discharge: No PONV  Anesthetic complications: no      Cardiovascular status: blood pressure returned to baseline and stable  Respiratory status: spontaneous ventilation, unassisted and nasal cannula  Hydration status: euvolemic  Follow-up not needed.  Comments: [facility]          Vitals Value Taken Time   /85 09/09/22 1610   Temp 36.8 °C (98.2 °F) 09/09/22 1503   Pulse 87 09/09/22 1503   Resp 18 09/09/22 1449   SpO2 97 % 09/09/22 1503   Vitals shown include unvalidated device data.      Event Time   Out of Recovery 14:51:50         Pain/Julee Score: Julee Score: 9 (9/9/2022  3:00 PM)

## 2022-09-09 NOTE — Clinical Note
----- Message from Jane Vela MD sent at 4/1/2021  8:37 AM CDT -----  No need to treat,   Likely contaminate. The sheath was inserted into the right femoral vein antegrade.

## 2022-09-10 VITALS
WEIGHT: 202.19 LBS | HEIGHT: 72 IN | RESPIRATION RATE: 16 BRPM | DIASTOLIC BLOOD PRESSURE: 74 MMHG | TEMPERATURE: 98 F | OXYGEN SATURATION: 92 % | BODY MASS INDEX: 27.39 KG/M2 | HEART RATE: 98 BPM | SYSTOLIC BLOOD PRESSURE: 119 MMHG

## 2022-09-10 PROBLEM — I34.0 MITRAL INCOMPETENCE: Status: ACTIVE | Noted: 2022-09-10

## 2022-09-10 LAB
ANION GAP SERPL CALC-SCNC: 9 MEQ/L
ANION GAP SERPL CALC-SCNC: 9 MEQ/L
AV INDEX (PROSTH): 0.46
AV MEAN GRADIENT: 15 MMHG
AV PEAK GRADIENT: 27 MMHG
AV VALVE AREA: 1.45 CM2
AV VELOCITY RATIO: 0.56
BASOPHILS # BLD AUTO: 0.04 X10(3)/MCL (ref 0–0.2)
BASOPHILS # BLD AUTO: 0.04 X10(3)/MCL (ref 0–0.2)
BASOPHILS NFR BLD AUTO: 0.7 %
BASOPHILS NFR BLD AUTO: 0.8 %
BSA FOR ECHO PROCEDURE: 2.16 M2
BUN SERPL-MCNC: 29 MG/DL (ref 8.4–25.7)
BUN SERPL-MCNC: 30.2 MG/DL (ref 8.4–25.7)
CALCIUM SERPL-MCNC: 8.9 MG/DL (ref 8.8–10)
CALCIUM SERPL-MCNC: 9.1 MG/DL (ref 8.8–10)
CHLORIDE SERPL-SCNC: 104 MMOL/L (ref 98–107)
CHLORIDE SERPL-SCNC: 105 MMOL/L (ref 98–107)
CO2 SERPL-SCNC: 25 MMOL/L (ref 23–31)
CO2 SERPL-SCNC: 25 MMOL/L (ref 23–31)
CREAT SERPL-MCNC: 1.99 MG/DL (ref 0.73–1.18)
CREAT SERPL-MCNC: 2.01 MG/DL (ref 0.73–1.18)
CREAT/UREA NIT SERPL: 14
CREAT/UREA NIT SERPL: 15
CV ECHO LV RWT: 0.32 CM
DOP CALC AO PEAK VEL: 2.6 M/S
DOP CALC AO VTI: 51.3 CM
DOP CALC LVOT AREA: 3.1 CM2
DOP CALC LVOT DIAMETER: 2 CM
DOP CALC LVOT PEAK VEL: 1.46 M/S
DOP CALC LVOT STROKE VOLUME: 74.42 CM3
DOP CALC MV VTI: 49.3 CM
DOP CALCLVOT PEAK VEL VTI: 23.7 CM
ECHO LV POSTERIOR WALL: 0.91 CM (ref 0.6–1.1)
EJECTION FRACTION: 35 %
EOSINOPHIL # BLD AUTO: 0.23 X10(3)/MCL (ref 0–0.9)
EOSINOPHIL # BLD AUTO: 0.23 X10(3)/MCL (ref 0–0.9)
EOSINOPHIL NFR BLD AUTO: 4.1 %
EOSINOPHIL NFR BLD AUTO: 4.4 %
ERYTHROCYTE [DISTWIDTH] IN BLOOD BY AUTOMATED COUNT: 23.7 % (ref 11.5–17)
ERYTHROCYTE [DISTWIDTH] IN BLOOD BY AUTOMATED COUNT: 23.8 % (ref 11.5–17)
FRACTIONAL SHORTENING: 15 % (ref 28–44)
GFR SERPLBLD CREATININE-BSD FMLA CKD-EPI: 33 MLS/MIN/1.73/M2
GFR SERPLBLD CREATININE-BSD FMLA CKD-EPI: 33 MLS/MIN/1.73/M2
GLUCOSE SERPL-MCNC: 120 MG/DL (ref 82–115)
GLUCOSE SERPL-MCNC: 87 MG/DL (ref 82–115)
HCT VFR BLD AUTO: 35.5 % (ref 42–52)
HCT VFR BLD AUTO: 36.5 % (ref 42–52)
HGB BLD-MCNC: 11.3 GM/DL (ref 14–18)
HGB BLD-MCNC: 11.3 GM/DL (ref 14–18)
IMM GRANULOCYTES # BLD AUTO: 0.01 X10(3)/MCL (ref 0–0.04)
IMM GRANULOCYTES # BLD AUTO: 0.02 X10(3)/MCL (ref 0–0.04)
IMM GRANULOCYTES NFR BLD AUTO: 0.2 %
IMM GRANULOCYTES NFR BLD AUTO: 0.4 %
INTERVENTRICULAR SEPTUM: 0.99 CM (ref 0.6–1.1)
LEFT ATRIUM SIZE: 5.8 CM
LEFT ATRIUM VOLUME INDEX MOD: 64 ML/M2
LEFT ATRIUM VOLUME MOD: 137 CM3
LEFT INTERNAL DIMENSION IN SYSTOLE: 4.83 CM (ref 2.1–4)
LEFT VENTRICLE DIASTOLIC VOLUME INDEX: 63.55 ML/M2
LEFT VENTRICLE DIASTOLIC VOLUME: 136 ML
LEFT VENTRICLE MASS INDEX: 99 G/M2
LEFT VENTRICLE SYSTOLIC VOLUME INDEX: 48.1 ML/M2
LEFT VENTRICLE SYSTOLIC VOLUME: 103 ML
LEFT VENTRICULAR INTERNAL DIMENSION IN DIASTOLE: 5.69 CM (ref 3.5–6)
LEFT VENTRICULAR MASS: 211.12 G
LVOT MG: 5 MMHG
LVOT MV: 1.01 CM/S
LYMPHOCYTES # BLD AUTO: 0.51 X10(3)/MCL (ref 0.6–4.6)
LYMPHOCYTES # BLD AUTO: 0.58 X10(3)/MCL (ref 0.6–4.6)
LYMPHOCYTES NFR BLD AUTO: 10.4 %
LYMPHOCYTES NFR BLD AUTO: 9.7 %
MCH RBC QN AUTO: 26 PG (ref 27–31)
MCH RBC QN AUTO: 26.5 PG (ref 27–31)
MCHC RBC AUTO-ENTMCNC: 31 MG/DL (ref 33–36)
MCHC RBC AUTO-ENTMCNC: 31.8 MG/DL (ref 33–36)
MCV RBC AUTO: 83.3 FL (ref 80–94)
MCV RBC AUTO: 83.9 FL (ref 80–94)
MONOCYTES # BLD AUTO: 0.49 X10(3)/MCL (ref 0.1–1.3)
MONOCYTES # BLD AUTO: 0.55 X10(3)/MCL (ref 0.1–1.3)
MONOCYTES NFR BLD AUTO: 9.3 %
MONOCYTES NFR BLD AUTO: 9.9 %
MV MEAN GRADIENT: 8 MMHG
MV PEAK GRADIENT: 14 MMHG
MV VALVE AREA BY CONTINUITY EQUATION: 1.51 CM2
NEUTROPHILS # BLD AUTO: 4 X10(3)/MCL (ref 2.1–9.2)
NEUTROPHILS # BLD AUTO: 4.1 X10(3)/MCL (ref 2.1–9.2)
NEUTROPHILS NFR BLD AUTO: 74.5 %
NEUTROPHILS NFR BLD AUTO: 75.6 %
NRBC BLD AUTO-RTO: 0 %
NRBC BLD AUTO-RTO: 0 %
PISA MRMAX VEL: 4.5 M/S
PISA TR MAX VEL: 3.05 M/S
PLATELET # BLD AUTO: 159 X10(3)/MCL (ref 130–400)
PLATELET # BLD AUTO: 163 X10(3)/MCL (ref 130–400)
PMV BLD AUTO: 10.6 FL (ref 7.4–10.4)
PMV BLD AUTO: 10.8 FL (ref 7.4–10.4)
POCT GLUCOSE: 102 MG/DL (ref 70–110)
POCT GLUCOSE: 187 MG/DL (ref 70–110)
POTASSIUM SERPL-SCNC: 3.8 MMOL/L (ref 3.5–5.1)
POTASSIUM SERPL-SCNC: 3.8 MMOL/L (ref 3.5–5.1)
RA PRESSURE: 3 MMHG
RBC # BLD AUTO: 4.26 X10(6)/MCL (ref 4.7–6.1)
RBC # BLD AUTO: 4.35 X10(6)/MCL (ref 4.7–6.1)
RIGHT VENTRICULAR END-DIASTOLIC DIMENSION: 3.86 CM
SODIUM SERPL-SCNC: 138 MMOL/L (ref 136–145)
SODIUM SERPL-SCNC: 139 MMOL/L (ref 136–145)
TR MAX PG: 37 MMHG
TRICUSPID ANNULAR PLANE SYSTOLIC EXCURSION: 1.24 CM
TV REST PULMONARY ARTERY PRESSURE: 40 MMHG
WBC # SPEC AUTO: 5.3 X10(3)/MCL (ref 4.5–11.5)
WBC # SPEC AUTO: 5.6 X10(3)/MCL (ref 4.5–11.5)

## 2022-09-10 PROCEDURE — 99900035 HC TECH TIME PER 15 MIN (STAT)

## 2022-09-10 PROCEDURE — 36415 COLL VENOUS BLD VENIPUNCTURE: CPT | Performed by: INTERNAL MEDICINE

## 2022-09-10 PROCEDURE — 80048 BASIC METABOLIC PNL TOTAL CA: CPT | Performed by: NURSE PRACTITIONER

## 2022-09-10 PROCEDURE — 80048 BASIC METABOLIC PNL TOTAL CA: CPT | Performed by: INTERNAL MEDICINE

## 2022-09-10 PROCEDURE — 36415 COLL VENOUS BLD VENIPUNCTURE: CPT | Performed by: NURSE PRACTITIONER

## 2022-09-10 PROCEDURE — 93010 EKG 12-LEAD: ICD-10-PCS | Mod: ,,, | Performed by: INTERNAL MEDICINE

## 2022-09-10 PROCEDURE — 93005 ELECTROCARDIOGRAM TRACING: CPT

## 2022-09-10 PROCEDURE — 85025 COMPLETE CBC W/AUTO DIFF WBC: CPT | Performed by: INTERNAL MEDICINE

## 2022-09-10 PROCEDURE — 93010 ELECTROCARDIOGRAM REPORT: CPT | Mod: ,,, | Performed by: INTERNAL MEDICINE

## 2022-09-10 PROCEDURE — 85025 COMPLETE CBC W/AUTO DIFF WBC: CPT | Performed by: NURSE PRACTITIONER

## 2022-09-10 PROCEDURE — 94799 UNLISTED PULMONARY SVC/PX: CPT

## 2022-09-10 PROCEDURE — 25000003 PHARM REV CODE 250: Performed by: INTERNAL MEDICINE

## 2022-09-10 RX ORDER — CLOPIDOGREL BISULFATE 75 MG/1
75 TABLET ORAL DAILY
Qty: 30 TABLET | Refills: 6 | Status: SHIPPED | OUTPATIENT
Start: 2022-09-10 | End: 2023-09-10

## 2022-09-10 RX ORDER — PANTOPRAZOLE SODIUM 40 MG/1
40 TABLET, DELAYED RELEASE ORAL DAILY
Qty: 90 TABLET | Refills: 1 | Status: SHIPPED | OUTPATIENT
Start: 2022-09-10 | End: 2023-09-10

## 2022-09-10 RX ADMIN — TORSEMIDE 40 MG: 20 TABLET ORAL at 08:09

## 2022-09-10 RX ADMIN — PANTOPRAZOLE SODIUM 40 MG: 40 TABLET, DELAYED RELEASE ORAL at 08:09

## 2022-09-10 RX ADMIN — ALLOPURINOL 100 MG: 100 TABLET ORAL at 06:09

## 2022-09-10 RX ADMIN — SACUBITRIL AND VALSARTAN 1 TABLET: 24; 26 TABLET, FILM COATED ORAL at 08:09

## 2022-09-10 RX ADMIN — APIXABAN 5 MG: 5 TABLET, FILM COATED ORAL at 08:09

## 2022-09-10 RX ADMIN — DAPAGLIFLOZIN 10 MG: 10 TABLET, FILM COATED ORAL at 08:09

## 2022-09-10 RX ADMIN — CLOPIDOGREL 75 MG: 75 TABLET, FILM COATED ORAL at 08:09

## 2022-09-10 RX ADMIN — METOPROLOL SUCCINATE 25 MG: 25 TABLET, EXTENDED RELEASE ORAL at 08:09

## 2022-09-10 RX ADMIN — Medication 1000 UNITS: at 08:09

## 2022-09-10 RX ADMIN — CITALOPRAM HYDROBROMIDE 20 MG: 20 TABLET ORAL at 06:09

## 2022-09-10 RX ADMIN — FERROUS SULFATE TAB 325 MG (65 MG ELEMENTAL FE) 1 EACH: 325 (65 FE) TAB at 08:09

## 2022-09-10 NOTE — DISCHARGE SUMMARY
[facility] - 4th Floor Medical Telemetry  Cardiology  Discharge Summary      Patient Name: Geo Barrow  MRN: [id number]  Admission Date: 9/9/2022  Hospital Length of Stay: 1 days  Discharge Date and Time:  09/10/2022 8:31 AM  Attending Physician: All Logan MD    Discharging Provider: MARTA Belcher  Primary Care Physician: Deidra Briggs MD    HPI:   Mr. Barrow is a 82 year old male with history of significant mitral regurgitation. He underwent successful MitrClip procedure with Dr. Logan on 9.9.22. Overall, the patient's procedure went well and he was transferred to the [facility] Floor where he had uneventful night. Patient required Low Dose supplemental O2 overnight due to desaturations in the mid 80's. He is now comfortable on Room Air with SPO2 at 93%. Breath Sounds are CTA. Home Diuretics have been resumed. Plan discharge today.    Studies:  Echocardiogram (9.10.22):  Moderately decreased systolic function.  The estimated ejection fraction is 35%.  Mild tricuspid regurgitation.  There is a transcutaneously-placed aortic bioprosthesis present. There is no aortic insufficiency present.  The aortic valve mean gradient is 15 mmHg with a dimensionless index of 0.46.  Moderate right atrial enlargement.  Mild right ventricular enlargement.  Normal central venous pressure (3 mmHg).  The estimated PA systolic pressure is 40 mmHg.  There are segmental left ventricular wall motion abnormalities.  Severe left atrial enlargement.  There is mild to moderate mitral valve regurgitation. S/P Mitral valve clip    Chiquita Clip (9.9.22):  Total Number of Clips: 1     1st  Clip Type: XTw     Pre op MR: +4     Post Procedural MR: trace-1     Post Op Graident: 3 mmHg     Closing V wave max: 3mmHg     Closing Mean LA pressure : 8mmHg     Complications: none    Procedure(s) (LRB):  MITRAL CLIP (N/A)  ECHOCARDIOGRAM,TRANSESOPHAGEAL (N/A)     Indwelling Lines/Drains at time of discharge:  Lines/Drains/Airways       None                  ROS:  No CP  No SOB  No Groin Pain    Physical Exam   Constitutional: He is oriented to person, place, and time.   HENT:   Head: Normocephalic.   Mouth/Throat: Mucous membranes are moist. Oropharynx is clear.   Cardiovascular: Normal rate and normal pulses. An irregular rhythm present. Pulmonary:      Effort: Pulmonary effort is normal. No respiratory distress.      Breath sounds: Normal breath sounds.      Comments: On Room Air SP02 93-94%    Abdominal: Soft. Normal appearance and bowel sounds are normal. He exhibits no distension. There is no abdominal tenderness.   Musculoskeletal:      Cervical back: Neck supple.      Right lower leg: No edema.      Left lower leg: No edema.      Comments: Chronic Venous Skin Discoloration to BLE. DP 2+. Legs Warm to Touch.   Neurological: He is alert and oriented to person, place, and time.   Skin: Skin is warm and dry.   Right Groin soft with no evidence of hematoma.   Psychiatric: His behavior is normal. Mood and judgment normal.   Vitals reviewed.      Hospital Course:  See Above    Goals of Care Treatment Preferences:  Code Status: Full Code      Consults:     Significant Diagnostic Studies: Labs:   CMP   Recent Labs   Lab 09/08/22  0851 09/09/22  0930 09/10/22  0409 09/10/22  0738    135* 139 138   K 4.2 4.1 3.8 3.8   CO2 28 24 25 25   BUN 35.5* 33.8* 30.2* 29.0*   CREATININE 2.04* 1.88* 1.99* 2.01*   CALCIUM 9.3 8.6* 8.9 9.1   ALBUMIN 3.7  --   --   --    BILITOT 0.8  --   --   --    ALKPHOS 106  --   --   --    AST 22  --   --   --    ALT 13  --   --   --     and CBC   Recent Labs   Lab 09/09/22  0930 09/10/22  0409 09/10/22  0738   WBC 5.3 5.6 5.3   HGB 11.4* 11.3* 11.3*   HCT 37.0* 35.5* 36.5*    159 163       Pending Diagnostic Studies:       None            Final Active Diagnoses:    Diagnosis Date Noted POA    PRINCIPAL PROBLEM:  Mitral incompetence [I34.0] 09/10/2022 Unknown    Acute on chronic diastolic heart failure [I50.33] 06/21/2022  Unknown      Problems Resolved During this Admission:     No new Assessment & Plan notes have been filed under this hospital service since the last note was generated.  Service: Cardiology      Discharged Condition: good    Disposition: Home or Self Care    Follow Up:   Follow-up Information       Gt Joshi MD Follow up on 9/20/2022.    Specialty: Cardiology  Why: 10:00 AM Hospital Follow Up  Contact information:  [address]  [address]  [place] [address]  [phone]                           Patient Instructions:      COVID-19 Routine Screening   Standing Status: Future Number of Occurrences: 1 Standing Exp. Date: 11/05/23     Order Specific Question Answer Comments   Is the patient symptomatic? No    Is this needed for pre-procedure or pre-op testing? Yes    Diagnosis: Pre-op testing [190608]      Diet Cardiac     Lifting restrictions   Order Comments: No Heavy Lifting (> 5 lbs) for 5 days.  Avoid Groin Straining Activities.  Keep Groin Site Clean/Dry.     Shower on day dressing removed (No bath)   EKG:      Telemetry:  AF CVR    Impression:  VHD- Mitral Regurgitation- Status Post Successful Mitral Clipping    - Echo Stable Post Clipping- Mild to Moderate MR  CAD  Acute/Chronic Combined Systolic Diastolic HF (Compensated)- On Room Air    - EF 35%  Chronic AF (Now CVR)    - On Eliquis  Chronic LBBB  History of AS S/P TAVR (Normal Function)  CKD (Stable)  History of Prostate Cancer    Plan:  Decrease Eliquis to 2.5 Mg PO BID (Age 82, Creatinine > 2)  Continue Plavix 75 Mg Daily  Prescribe Protonix for Gastric Protection  Prior Home Medications Resumed Including Diuretics  Groin Precautions/Activity Restrictions Discussed  Follow Up AM Echo (Done, Reviewed)- Stable for discharge.    Medications:  Reconciled Home Medications:      Medication List        START taking these medications      clopidogreL 75 mg tablet  Commonly known as: PLAVIX  Take 1 tablet (75 mg total) by mouth once daily.             CHANGE how you take these medications      apixaban 2.5 mg Tab  Commonly known as: ELIQUIS  Take 1 tablet (2.5 mg total) by mouth 2 (two) times daily.  What changed:   medication strength  how much to take     pantoprazole 40 MG tablet  Commonly known as: PROTONIX  Take 1 tablet (40 mg total) by mouth once daily.  What changed: when to take this            CONTINUE taking these medications      allopurinoL 100 MG tablet  Commonly known as: ZYLOPRIM  Take 1 tablet by mouth once daily at 6am.     atorvastatin 20 MG tablet  Commonly known as: LIPITOR  Take 20 mg by mouth every evening.     citalopram 20 MG tablet  Commonly known as: CeleXA  Take 1 tablet by mouth once daily at 6am.     docusate sodium 100 MG capsule  Commonly known as: COLACE  Take 100 mg by mouth nightly.     ENTRESTO 24-26 mg per tablet  Generic drug: sacubitriL-valsartan  Take 1 tablet by mouth 2 (two) times daily.     FARXIGA 10 mg tablet  Generic drug: dapagliflozin  Take 10 mg by mouth once daily.     ferrous sulfate 325 mg (65 mg iron) Tab tablet  Commonly known as: FEOSOL  Take 325 mg by mouth nightly.     metoprolol succinate 25 MG 24 hr tablet  Commonly known as: TOPROL-XL  Take 25 mg by mouth once daily.     potassium chloride 10 MEQ Cpsr  Commonly known as: MICRO-K  Take 10 mEq by mouth once daily.     spironolactone 25 MG tablet  Commonly known as: ALDACTONE  Take 12.5 mg by mouth nightly.     tamsulosin 0.4 mg Cap  Commonly known as: FLOMAX  Take 0.4 mg by mouth nightly.     torsemide 10 MG Tab  Commonly known as: DEMADEX  Take 40 mg by mouth once daily.     vitamin D 1000 units Tab  Commonly known as: VITAMIN D3  Take 1,000 Units by mouth once daily. nightly            STOP taking these medications      finasteride 5 mg tablet  Commonly known as: PROSCAR     furosemide 20 MG tablet  Commonly known as: LASIX     metoprolol tartrate 25 MG tablet  Commonly known as: LOPRESSOR     warfarin 2.5 MG tablet  Commonly known as:  COUMADIN              Time spent on the discharge of patient: 31 minutes    MARTA Belcher  Cardiology  [facility] - 4th Floor Medical Telemetry

## 2022-09-10 NOTE — NURSING
While sleeping, pt's O2 sat dropped to 85-86% and stayed there for 10mins without recovery. NC@2L applied, O2 recovered to 93% and remained stable rest of night. Removed O2 when pt awoke in am.

## 2022-09-10 NOTE — OP NOTE
Transesophageal Echocardiogram for   Mitral Clip Op Note           SUMMARY     PROCEDURE DATE: 9/9/2022      PHYSICIAN:  Hiren Levine MD    PROCEDURE:  Transesophageal Echocardiogram for Mitral Clip    The nature of the procedure, risks and alternatives were discussed with the patient who gave informed consent.    Patient was intubated by anesthesia. MANOHAR probed was lubricated and advanced easily into the esophagus. The appropriate images were obtained for the procedure. There were no complications.    FINDINGS - INTERVENTION:     Pre-Procedure Assessment:     Patient has severe mitral regurgitation that is degernerative in nature with a centrally directed regurgitant jet.    - No pericardial effusion  - No evidence of thrombus in the left atrial appendage  - Pulmonary venous flow pattern: reversed  - Posterior mitral leaflet length: 1.2cm  - Transvalvular gradient: 2mmHg    Transseptal puncture was made 4.8cm above the coaptation site.    The mitral clip was placed at A2-P2 with excellent leaflet insertion, and the residual gradient was 2mmHg. The resultant mitral regurgitation was trace.      Post-Procedure Assessment:    - No pericardial effusion  - Pulmonary venous flow pattern: normal      Hiren Levine MD

## 2022-09-12 ENCOUNTER — PATIENT OUTREACH (OUTPATIENT)
Dept: ADMINISTRATIVE | Facility: CLINIC | Age: 82
End: 2022-09-12
Payer: MEDICARE

## 2022-09-12 NOTE — PROGRESS NOTES
C3 nurse spoke with Geo Barrow's daughter Yasmeen for a TCC post hospital discharge follow up call, call completed. The patient has a scheduled HOSFU appointment with Gt Joshi MD (Cardiology) on 9/20/2022 @ 10 AM.

## 2022-09-14 NOTE — PHYSICIAN QUERY
PT Name: Geo Barrow  MR #: [id number]     DOCUMENTATION CLARIFICATION     CDS/: Fermín Lloyd Jr, RN CCDS              Contact information:[phone]  This form is a permanent document in the medical record.     Query Date: September 15, 2022    By submitting this query, we are merely seeking further clarification of documentation.  Please utilize your independent clinical judgment when addressing the question(s) below.    The Medical Record contains the following   Indicators Supporting Clinical Findings Location in Medical Record   x Heart Failure documented     Acute/Chronic Combined Systolic Diastolic HF (Compensated)- On Room Air    - EF 35%    Acute on chronic diastolic heart failure      9/8 H&P      9/10 Discharge summary        9/10 Discharge Summary     BNP     x EF/Echo Studies:  Echocardiogram (9.10.22):  ? Moderately decreased systolic function.  ? The estimated ejection fraction is 35%.  ? Mild tricuspid regurgitation.  ? There is a transcutaneously-placed aortic bioprosthesis present. There is no aortic insufficiency present.  ? The aortic valve mean gradient is 15 mmHg with a dimensionless index of 0.46.  ? Moderate right atrial enlargement.  ? Mild right ventricular enlargement.  ? Normal central venous pressure (3 mmHg).  ? The estimated PA systolic pressure is 40 mmHg.  ? There are segmental left ventricular wall motion abnormalities.  ? Severe left atrial enlargement.  ? There is mild to moderate mitral valve regurgitation. S/P Mitral valve clip 9/10 Discharge Summary    x Radiology findings  9/8 H&P   x Subjective/Objective Respiratory Conditions         ROS:  No CP  No SOB  No Groin Pain 9/8 H&P      9/8 H&P      9/10 Discharge Summary     Recent/Current MI      Heart Transplant, LVAD     x Edema, JVD       Right lower leg: No edema.      Left lower leg: No edema.  9/8 H&P      9/10 Discharge Summary     Ascites     x Diuretics/Meds torsemide tablet 40 mg Daily   9/9-9/10 MAR   x  Other Treatment While sleeping, pt's O2 sat dropped to 85-86% and stayed there for 10mins without recovery. NC@2L applied, O2 recovered to 93% and remained stable rest of night. Removed O2 when pt awoke in am. 9/10 Nursing Note   x Other   Procedure(s) (LRB):  MITRAL CLIP (N/A)  ECHOCARDIOGRAM,TRANSESOPHAGEAL (N/A)   9/9 Op Note             Heart failure is a clinical diagnosis which includes symptomatic fluid retention, elevated intracardiac pressures, and/or the inability of the heart to deliver adequate blood flow.    Heart Failure with reduced Ejection Fraction (HFrEF) or Systolic Heart Failure (loses ability to contract normally, EF is <40%)    Heart Failure with preserved Ejection Fraction (HFpEF) or Diastolic Heart Failure (stiff ventricles, does not relax properly, EF is >50%)     Heart Failure with Combined Systolic and Diastolic Failure (stiff ventricles, does not relax properly and EF is <50%)    Mid-range or mildly reduced ejection fraction (HFmrEF) is classified as systolic heart failure.  Congestive heart failure with a recovered EF is classified as Diastolic Heart Failure.  Common clues to acute exacerbation:  Rapidly progressive symptoms (w/in 2 weeks of presentation), using IV diuretics, using supplemental O2, pulmonary edema on Xray, new or worsening pleural effusion, +JVD or other signs of volume overload, MI w/in 4 weeks, and/or BNP >500  The clinical guidelines noted are only system guidelines, and do not replace the providers clinical judgment.    Provider, due to documentation conflict please clarify the      Type    and       Acuity    of      Congestive Heart Failure         [  x ]  Acute on Chronic Diastolic Heart Failure (HFpEF) - worsening of CHF signs/symptoms in preexisting CHF   [   ]  Chronic Diastolic Heart Failure (HFpEF) - preexisting and stable   [   ]  Acute on Chronic Combined Systolic and Diastolic Heart Failure - worsening of CHF signs/symptoms in preexisting CHF   [   ]   Chronic Combined Systolic and Diastolic Heart Failure - pre-existing and stable   [   ]  Other (please specify other Type and Acuity):    [   ]  Clinically Undetermined     Please document in your progress notes daily for the duration of treatment until resolved and include in your discharge summary.    References:  American Heart Association editorial staff. (2017, May). Ejection Fraction Heart Failure Measurement. American Heart Association. https://www.heart.org/en/health-topics/heart-failure/diagnosing-heart-failure/ejection-fraction-heart-failure-measurement#:~:text=Ejection%20fraction%20(EF)%20is%20a,pushed%20out%20with%20each%20heartbeat  GLADYS Rivas (2020, December 15). Heart failure with preserved ejection fraction: Clinical manifestations and diagnosis. [employer]. https://www.[employer]/contents/heart-failure-with-preserved-ejection-fraction-clinical-manifestations-and-diagnosis.  ICD-10-CM/PCS Coding Clinic Third Quarter ICD-10, Effective with discharges: September 8, 2020 [employer] [facility] [employer] § Heart failure with mid-range or mildly reduced ejection fraction (2020).  ICD-10-CM/PCS Coding Clinic Third Quarter ICD-10, Effective with discharges: September 8, 2020 [employer] [facility] § Heart failure with recovered ejection fraction (2020).  Form No. [id number]

## (undated) DEVICE — ELECTRODE DEFIB NON-RADIOPAQUE

## (undated) DEVICE — KIT CANIST SUCTION 1200CC

## (undated) DEVICE — SHEATH DESTINATION 6F X 45CM

## (undated) DEVICE — SOL IRRI STRL WATER 1000ML

## (undated) DEVICE — Device

## (undated) DEVICE — GUIDEWIRE AMPLATZ STIFF 260X7

## (undated) DEVICE — SPONGE LAP XRAY DTECT 18X18IN

## (undated) DEVICE — TUBING HIGH PRESSURE 60IN

## (undated) DEVICE — FORCEP BX LG CAP 2.8MMX240CM

## (undated) DEVICE — GUIDEWIRE EMERALD 3MM 175X5CM

## (undated) DEVICE — SET MICPUNC ACC STIFF CANNULA

## (undated) DEVICE — GUIDEWIRE AMPLATZ XSTIFF 260CM

## (undated) DEVICE — GUIDEWIRE EMERALD .035IN 260CM

## (undated) DEVICE — CATH ELECTRODE BLLN 5FR 110CM

## (undated) DEVICE — GUIDEWIRE SAFARI2 XS CRV 275CM

## (undated) DEVICE — TIP SUCTION YANKAUER

## (undated) DEVICE — STOPCOCK IV 4 WAY LG BOR ROT M

## (undated) DEVICE — SHEATH INTRODUCER 6FR 11CM

## (undated) DEVICE — TUBING O2 FEMALE CONN 13FT

## (undated) DEVICE — CATH AL 1.0 5/BX

## (undated) DEVICE — KIT GLIDESHEATH SLEND 7FR 10CM

## (undated) DEVICE — BAG LABGUARD BIOHAZARD 6X9IN

## (undated) DEVICE — FLUID DELIVERY SET WITH FILTER

## (undated) DEVICE — KIT SURGICAL COLON .25 1.1OZ

## (undated) DEVICE — COLLECTION SPECIMEN NEPTUNE

## (undated) DEVICE — CATH IMPULSE 5F 100CM FR4

## (undated) DEVICE — TUBING HP AIRLSS ROT ADPT 30IN

## (undated) DEVICE — SUT ETHBND XTRA 1 OS-8 30IN

## (undated) DEVICE — KIT VASCULAR DILATOR PAS

## (undated) DEVICE — BAND TR COMP DEVICE REG 24CM

## (undated) DEVICE — CONTAINER SPECIMEN SCREW 4OZ

## (undated) DEVICE — SYS WASTE FLD DISPOSAL 1400ML

## (undated) DEVICE — SHEATH PINNACLE 8FR

## (undated) DEVICE — SET ANGIO ACIST CVI ANGIOTOUCH

## (undated) DEVICE — INTRODUCER SL1 406840

## (undated) DEVICE — CATH EMPULSE ANGLED 5FR PIGTAI